# Patient Record
Sex: FEMALE | Race: WHITE | Employment: PART TIME | URBAN - METROPOLITAN AREA
[De-identification: names, ages, dates, MRNs, and addresses within clinical notes are randomized per-mention and may not be internally consistent; named-entity substitution may affect disease eponyms.]

---

## 2017-02-21 ENCOUNTER — TELEPHONE (OUTPATIENT)
Dept: OBGYN CLINIC | Facility: CLINIC | Age: 27
End: 2017-02-21

## 2017-02-21 DIAGNOSIS — Z32.00 PREGNANCY EXAMINATION OR TEST, PREGNANCY UNCONFIRMED: Primary | ICD-10-CM

## 2017-02-21 NOTE — TELEPHONE ENCOUNTER
Schedule colpo ASAP (in RN book slot) and annual in regular gyne slot, 1st available. Please call pt. Will need pregnancy test the day before colpo. Order placed.

## 2017-02-28 ENCOUNTER — OFFICE VISIT (OUTPATIENT)
Dept: OTOLARYNGOLOGY | Facility: CLINIC | Age: 27
End: 2017-02-28

## 2017-02-28 ENCOUNTER — TELEPHONE (OUTPATIENT)
Dept: OBGYN CLINIC | Facility: CLINIC | Age: 27
End: 2017-02-28

## 2017-02-28 ENCOUNTER — OFFICE VISIT (OUTPATIENT)
Dept: OBGYN CLINIC | Facility: CLINIC | Age: 27
End: 2017-02-28

## 2017-02-28 ENCOUNTER — APPOINTMENT (OUTPATIENT)
Dept: LAB | Facility: HOSPITAL | Age: 27
End: 2017-02-28
Attending: OBSTETRICS & GYNECOLOGY
Payer: MEDICAID

## 2017-02-28 VITALS
TEMPERATURE: 99 F | WEIGHT: 115 LBS | HEIGHT: 63 IN | DIASTOLIC BLOOD PRESSURE: 60 MMHG | BODY MASS INDEX: 20.37 KG/M2 | SYSTOLIC BLOOD PRESSURE: 90 MMHG

## 2017-02-28 VITALS
BODY MASS INDEX: 20.37 KG/M2 | DIASTOLIC BLOOD PRESSURE: 66 MMHG | HEART RATE: 85 BPM | WEIGHT: 115 LBS | HEIGHT: 63 IN | SYSTOLIC BLOOD PRESSURE: 106 MMHG

## 2017-02-28 DIAGNOSIS — Z32.00 PREGNANCY EXAMINATION OR TEST, PREGNANCY UNCONFIRMED: ICD-10-CM

## 2017-02-28 DIAGNOSIS — J34.89 NASAL OBSTRUCTION: Primary | ICD-10-CM

## 2017-02-28 DIAGNOSIS — N76.0 VAGINITIS AND VULVOVAGINITIS: Primary | ICD-10-CM

## 2017-02-28 LAB — HCG SERPL QL: NEGATIVE

## 2017-02-28 PROCEDURE — 84703 CHORIONIC GONADOTROPIN ASSAY: CPT

## 2017-02-28 PROCEDURE — 99243 OFF/OP CNSLTJ NEW/EST LOW 30: CPT | Performed by: OTOLARYNGOLOGY

## 2017-02-28 PROCEDURE — 99212 OFFICE O/P EST SF 10 MIN: CPT | Performed by: OTOLARYNGOLOGY

## 2017-02-28 PROCEDURE — 36415 COLL VENOUS BLD VENIPUNCTURE: CPT

## 2017-02-28 RX ORDER — BUPROPION HYDROCHLORIDE 150 MG/1
150 TABLET ORAL DAILY
COMMUNITY
Start: 2017-02-19 | End: 2017-05-15

## 2017-02-28 NOTE — PROGRESS NOTES
LUNA pt. Has colpo scheduled tomorrow. Had itching yesterday and pt self treated herself with Monistat 1 and Diflucan yesterday. Symptoms better. Was concerned that she would not be able to have colpo with yeast infection.     No exam done since pt was

## 2017-02-28 NOTE — PROGRESS NOTES
Pily Marino is a 23year old female. Patient presents with:  Nose Problem: trouble breathing at her left nostril for 5 months      HISTORY OF PRESENT ILLNESS    She presents with a five-month history of chronic left nasal obstruction.  This all occurred Negative Dyspnea and wheezing. Cardio Negative Chest pain, irregular heartbeat/palpitations and syncope. GI Negative Abdominal pain and diarrhea. Endocrine Negative Cold intolerance and heat intolerance. Neuro Negative Tremors.    Psych Negative Anx 250 MG Oral Tab, Take two tablets by mouth today, then one daily. , Disp: 6 tablet, Rfl: 0  •  predniSONE 10 MG Oral Tab, Take 30 mg po [id number], 20 mg po dailyx 2days,10 mg po daily x2 days,5 mg po dailyx1 day, Disp: 13 tablet, Rfl: 0  •  Nitrofurantoin

## 2017-02-28 NOTE — TELEPHONE ENCOUNTER
Pt thinks that she has a yeast infection. Pt has vaginal itching and creamy thick discharge and vaginal irritation. Denies vaginal odor. Pt made an appt to see Nestor Zavala today. (Pt is a [facility] pt.)  pT Took Monistat one day on 2/27/17 for only one day.   Anell Mealy

## 2017-03-01 ENCOUNTER — OFFICE VISIT (OUTPATIENT)
Dept: OBGYN CLINIC | Facility: CLINIC | Age: 27
End: 2017-03-01

## 2017-03-01 VITALS — HEART RATE: 73 BPM | DIASTOLIC BLOOD PRESSURE: 68 MMHG | SYSTOLIC BLOOD PRESSURE: 108 MMHG

## 2017-03-01 DIAGNOSIS — R87.810 CERVICAL HIGH RISK HUMAN PAPILLOMAVIRUS (HPV) DNA TEST POSITIVE: Primary | ICD-10-CM

## 2017-03-01 PROCEDURE — 88305 TISSUE EXAM BY PATHOLOGIST: CPT | Performed by: OBSTETRICS & GYNECOLOGY

## 2017-03-01 PROCEDURE — 57454 BX/CURETT OF CERVIX W/SCOPE: CPT | Performed by: OBSTETRICS & GYNECOLOGY

## 2017-03-01 NOTE — PROCEDURES
Colpo w/Cx Biopsy and ECC    Pregnancy Results: negative from blood test   Birth control method(s) used: condoms    Consent signed. Procedure discussed with patient in detail including indication, risk, benefits, alternatives and complications.     Indic

## 2017-03-03 ENCOUNTER — TELEPHONE (OUTPATIENT)
Dept: OTOLARYNGOLOGY | Facility: CLINIC | Age: 27
End: 2017-03-03

## 2017-03-03 NOTE — TELEPHONE ENCOUNTER
pt called, lov 2/28/17 w/ERIN. Patient is following up on a Prior Auth for a CT Sinus. Please advise.

## 2017-03-07 ENCOUNTER — TELEPHONE (OUTPATIENT)
Dept: OBGYN CLINIC | Facility: CLINIC | Age: 27
End: 2017-03-07

## 2017-03-07 DIAGNOSIS — F41.9 ANXIETY: ICD-10-CM

## 2017-03-07 DIAGNOSIS — N87.1 CIN II (CERVICAL INTRAEPITHELIAL NEOPLASIA II): Primary | ICD-10-CM

## 2017-03-07 NOTE — TELEPHONE ENCOUNTER
Advised pt that Prior authorization pending per ADALGISA - tracking #[id number], clinicals faxed to [facility] [phone]. Advised pt once approved, pt will be notified.

## 2017-03-07 NOTE — TELEPHONE ENCOUNTER
Patient calling to check status on prior auth. Wants to know if it has been approved or not. States she wants to get CT done asap because she can not breath through her nose and is very uncomfortable. Please advise. Thank you.

## 2017-03-08 NOTE — TELEPHONE ENCOUNTER
Pt given results and recommendations. Pt would like to discuss LEEP, states her last LEEP was done under anesthesia. Pt would like to schedule LEEP under anesthesia as she had a lot of discomfort during colposcopy.  Pt advised message will be sent to [address] [facility] fo

## 2017-03-08 NOTE — TELEPHONE ENCOUNTER
OB GYN SURGICAL SCHEDULING    Diagnosis: BESSY 2, anxiety    Operative Procedure:  LEEP    Side: neither    Date: when not on menses    Admission:  Day Surgery    Anesthesia:  iv sed/mac     Bowel Prep:  No    Cytotec (200 mcg night prior):  No    Rep needed

## 2017-03-08 NOTE — TELEPHONE ENCOUNTER
Spoke to the patient advised patient and we confirmed surgery is scheduled 4/17/17 at 7:30am [facility]. Patient instructions mailed. Pat orders routed.

## 2017-03-08 NOTE — TELEPHONE ENCOUNTER
----- Message from Roz Pineda MD sent at 3/6/2017 11:31 AM CST -----  Results with BESSY 2 & (+) ECC BESSY 1 -- given already has had one LEEP in past, can send for second opinion LEEP -- Pt is [employer].  If wishes for LEEP by me, then needs to wait one mo

## 2017-03-09 NOTE — TELEPHONE ENCOUNTER
LMTCB- please inform pt that prior authorization approved for Sinus CT at [address] [place] [address]. Pt can call central scheduling to schedule.

## 2017-03-11 ENCOUNTER — HOSPITAL ENCOUNTER (OUTPATIENT)
Dept: CT IMAGING | Age: 27
Discharge: HOME | End: 2017-03-11
Attending: OTOLARYNGOLOGY
Payer: MEDICAID

## 2017-03-11 DIAGNOSIS — J34.89 NASAL OBSTRUCTION: ICD-10-CM

## 2017-03-11 PROCEDURE — 70486 CT MAXILLOFACIAL W/O DYE: CPT

## 2017-03-14 ENCOUNTER — OFFICE VISIT (OUTPATIENT)
Dept: OTOLARYNGOLOGY | Facility: CLINIC | Age: 27
End: 2017-03-14

## 2017-03-14 VITALS
WEIGHT: 115 LBS | BODY MASS INDEX: 20.37 KG/M2 | TEMPERATURE: 98 F | SYSTOLIC BLOOD PRESSURE: 100 MMHG | HEIGHT: 63 IN | DIASTOLIC BLOOD PRESSURE: 60 MMHG

## 2017-03-14 DIAGNOSIS — J34.3 HYPERTROPHY OF NASAL TURBINATES: ICD-10-CM

## 2017-03-14 DIAGNOSIS — J34.2 DEVIATED NASAL SEPTUM: Primary | ICD-10-CM

## 2017-03-14 DIAGNOSIS — J32.0 CHRONIC MAXILLARY SINUSITIS: ICD-10-CM

## 2017-03-14 PROCEDURE — 31231 NASAL ENDOSCOPY DX: CPT | Performed by: OTOLARYNGOLOGY

## 2017-03-14 PROCEDURE — 99212 OFFICE O/P EST SF 10 MIN: CPT | Performed by: OTOLARYNGOLOGY

## 2017-03-14 PROCEDURE — 99214 OFFICE O/P EST MOD 30 MIN: CPT | Performed by: OTOLARYNGOLOGY

## 2017-03-14 NOTE — PROGRESS NOTES
Juan Boston is a 23year old female. Patient presents with: Follow - Up: CT scan result done on 3/11/17      HISTORY OF PRESENT ILLNESS      She presents with a five-month history of chronic left nasal obstruction. This all occurred after a cold.  Since Crohn's   • Anxiety    • Crohn's disease ([facility] [address] 2012   • Human papilloma virus infection            Past Surgical History    COLONOSCOPY  2/2013         REVIEW OF SYSTEMS    System Neg/Pos Details   Constitutional Negative Fatigue, fever and weight loss. Lips/teeth/gums - Normal. Tonsils - Normal. Oropharynx - Normal.   Nose/Mouth/Throat Normal Nares - Right: Normal Left: Normal. Septum -Deviated to the right 75% Turbinates - Right: Mild hypertrophy, Left:Moderate hypertrophy   Nasal endoscopy    Nasal muc

## 2017-03-15 ENCOUNTER — TELEPHONE (OUTPATIENT)
Dept: OTOLARYNGOLOGY | Facility: CLINIC | Age: 27
End: 2017-03-15

## 2017-03-15 NOTE — TELEPHONE ENCOUNTER
LM regarding ins verification is handled by [facility] care at [phone] or [address] [place] [address].

## 2017-03-15 NOTE — TELEPHONE ENCOUNTER
Pt is having procedure on 3/20- needs PA - [facility] call [facility] [phone]

## 2017-03-16 ENCOUNTER — TELEPHONE (OUTPATIENT)
Dept: OTOLARYNGOLOGY | Facility: CLINIC | Age: 27
End: 2017-03-16

## 2017-03-16 RX ORDER — METHYLPREDNISOLONE 4 MG/1
TABLET ORAL
Qty: 1 PACKAGE | Refills: 0 | Status: SHIPPED | OUTPATIENT
Start: 2017-03-16 | End: 2017-03-28 | Stop reason: ALTCHOICE

## 2017-03-16 NOTE — TELEPHONE ENCOUNTER
Per  send in rx for medrol dose pack, pt to start medrol dose pack 5 days prior to surgery, pt is not to take on monday

## 2017-03-17 ENCOUNTER — TELEPHONE (OUTPATIENT)
Dept: OTOLARYNGOLOGY | Facility: CLINIC | Age: 27
End: 2017-03-17

## 2017-03-17 NOTE — TELEPHONE ENCOUNTER
Pt scheduled for septoplasty, turb red on 3/17. Pt requesting excuse note for work to excuse her for one week. Dr. Roger Contreras, please advise if okay to generate note; also note if pt will need to be off longer and any restrictions.

## 2017-03-17 NOTE — TELEPHONE ENCOUNTER
pt called. She is scheduled for surgery Monday 3/20/17. She will need a note for her work to excuse her for one week. Please call when ready.

## 2017-03-20 ENCOUNTER — HOSPITAL ENCOUNTER (OUTPATIENT)
Facility: HOSPITAL | Age: 27
Setting detail: HOSPITAL OUTPATIENT SURGERY
Discharge: HOME | End: 2017-03-20
Attending: OTOLARYNGOLOGY | Admitting: OTOLARYNGOLOGY
Payer: MEDICAID

## 2017-03-20 ENCOUNTER — ANESTHESIA (OUTPATIENT)
Dept: SURGERY | Facility: HOSPITAL | Age: 27
End: 2017-03-20
Payer: MEDICAID

## 2017-03-20 ENCOUNTER — SURGERY (OUTPATIENT)
Age: 27
End: 2017-03-20

## 2017-03-20 ENCOUNTER — ANESTHESIA EVENT (OUTPATIENT)
Dept: SURGERY | Facility: HOSPITAL | Age: 27
End: 2017-03-20
Payer: MEDICAID

## 2017-03-20 VITALS
BODY MASS INDEX: 19.31 KG/M2 | SYSTOLIC BLOOD PRESSURE: 114 MMHG | OXYGEN SATURATION: 100 % | TEMPERATURE: 98 F | RESPIRATION RATE: 20 BRPM | WEIGHT: 109 LBS | DIASTOLIC BLOOD PRESSURE: 65 MMHG | HEART RATE: 61 BPM | HEIGHT: 63 IN

## 2017-03-20 DIAGNOSIS — J32.0 CHRONIC MAXILLARY SINUSITIS: ICD-10-CM

## 2017-03-20 DIAGNOSIS — J34.3 HYPERTROPHY OF NASAL TURBINATES: ICD-10-CM

## 2017-03-20 DIAGNOSIS — J34.2 DEVIATED NASAL SEPTUM: ICD-10-CM

## 2017-03-20 DIAGNOSIS — J33.9 NASAL POLYPOSIS: Primary | ICD-10-CM

## 2017-03-20 LAB — B-HCG UR QL: NEGATIVE

## 2017-03-20 PROCEDURE — 30140 RESECT INFERIOR TURBINATE: CPT | Performed by: OTOLARYNGOLOGY

## 2017-03-20 PROCEDURE — 099R4ZZ DRAINAGE OF LEFT MAXILLARY SINUS, PERCUTANEOUS ENDOSCOPIC APPROACH: ICD-10-PCS | Performed by: OTOLARYNGOLOGY

## 2017-03-20 PROCEDURE — 31237 NSL/SINS NDSC SURG BX POLYPC: CPT | Performed by: OTOLARYNGOLOGY

## 2017-03-20 PROCEDURE — 8E09XBG COMPUTER ASSISTED PROCEDURE OF HEAD AND NECK REGION, WITH COMPUTERIZED TOMOGRAPHY: ICD-10-PCS | Performed by: OTOLARYNGOLOGY

## 2017-03-20 PROCEDURE — 09BL7ZZ EXCISION OF NASAL TURBINATE, VIA NATURAL OR ARTIFICIAL OPENING: ICD-10-PCS | Performed by: OTOLARYNGOLOGY

## 2017-03-20 PROCEDURE — 09BM0ZZ EXCISION OF NASAL SEPTUM, OPEN APPROACH: ICD-10-PCS | Performed by: OTOLARYNGOLOGY

## 2017-03-20 PROCEDURE — 09BR4ZX EXCISION OF LEFT MAXILLARY SINUS, PERCUTANEOUS ENDOSCOPIC APPROACH, DIAGNOSTIC: ICD-10-PCS | Performed by: OTOLARYNGOLOGY

## 2017-03-20 PROCEDURE — 30520 REPAIR OF NASAL SEPTUM: CPT | Performed by: OTOLARYNGOLOGY

## 2017-03-20 PROCEDURE — 09BK4ZX EXCISION OF NASAL MUCOSA AND SOFT TISSUE, PERCUTANEOUS ENDOSCOPIC APPROACH, DIAGNOSTIC: ICD-10-PCS | Performed by: OTOLARYNGOLOGY

## 2017-03-20 PROCEDURE — 31267 ENDOSCOPY MAXILLARY SINUS: CPT | Performed by: OTOLARYNGOLOGY

## 2017-03-20 RX ORDER — HYDROCODONE BITARTRATE AND ACETAMINOPHEN 7.5; 325 MG/1; MG/1
1 TABLET ORAL EVERY 6 HOURS PRN
Qty: 30 TABLET | Refills: 0 | Status: SHIPPED | OUTPATIENT
Start: 2017-03-20 | End: 2017-03-28 | Stop reason: ALTCHOICE

## 2017-03-20 RX ORDER — HYDROMORPHONE HYDROCHLORIDE 1 MG/ML
0.4 INJECTION, SOLUTION INTRAMUSCULAR; INTRAVENOUS; SUBCUTANEOUS EVERY 5 MIN PRN
Status: DISCONTINUED | OUTPATIENT
Start: 2017-03-20 | End: 2017-03-20

## 2017-03-20 RX ORDER — LIDOCAINE HYDROCHLORIDE 40 MG/ML
SOLUTION TOPICAL AS NEEDED
Status: DISCONTINUED | OUTPATIENT
Start: 2017-03-20 | End: 2017-03-20 | Stop reason: SURG

## 2017-03-20 RX ORDER — ACETAMINOPHEN 160 MG/5ML
650 SOLUTION ORAL EVERY 4 HOURS PRN
Status: DISCONTINUED | OUTPATIENT
Start: 2017-03-20 | End: 2017-03-20

## 2017-03-20 RX ORDER — ONDANSETRON 4 MG/1
4 TABLET, ORALLY DISINTEGRATING ORAL EVERY 6 HOURS PRN
Status: DISCONTINUED | OUTPATIENT
Start: 2017-03-20 | End: 2017-03-20

## 2017-03-20 RX ORDER — HALOPERIDOL 5 MG/ML
0.25 INJECTION INTRAMUSCULAR ONCE AS NEEDED
Status: DISCONTINUED | OUTPATIENT
Start: 2017-03-20 | End: 2017-03-20

## 2017-03-20 RX ORDER — HYDROCODONE BITARTRATE AND ACETAMINOPHEN 5; 325 MG/1; MG/1
1 TABLET ORAL AS NEEDED
Status: COMPLETED | OUTPATIENT
Start: 2017-03-20 | End: 2017-03-20

## 2017-03-20 RX ORDER — ONDANSETRON 2 MG/ML
4 INJECTION INTRAMUSCULAR; INTRAVENOUS ONCE AS NEEDED
Status: DISCONTINUED | OUTPATIENT
Start: 2017-03-20 | End: 2017-03-20

## 2017-03-20 RX ORDER — CEPHALEXIN 500 MG/1
500 CAPSULE ORAL EVERY 8 HOURS
Qty: 21 CAPSULE | Refills: 0 | Status: SHIPPED | OUTPATIENT
Start: 2017-03-20 | End: 2017-03-28 | Stop reason: ALTCHOICE

## 2017-03-20 RX ORDER — LIDOCAINE HYDROCHLORIDE AND EPINEPHRINE 10; 10 MG/ML; UG/ML
INJECTION, SOLUTION INFILTRATION; PERINEURAL AS NEEDED
Status: DISCONTINUED | OUTPATIENT
Start: 2017-03-20 | End: 2017-03-20 | Stop reason: HOSPADM

## 2017-03-20 RX ORDER — ACETAMINOPHEN 325 MG/1
650 TABLET ORAL ONCE
Status: COMPLETED | OUTPATIENT
Start: 2017-03-20 | End: 2017-03-20

## 2017-03-20 RX ORDER — SODIUM CHLORIDE, SODIUM LACTATE, POTASSIUM CHLORIDE, CALCIUM CHLORIDE 600; 310; 30; 20 MG/100ML; MG/100ML; MG/100ML; MG/100ML
INJECTION, SOLUTION INTRAVENOUS CONTINUOUS
Status: DISCONTINUED | OUTPATIENT
Start: 2017-03-20 | End: 2017-03-20

## 2017-03-20 RX ORDER — HYDROCODONE BITARTRATE AND ACETAMINOPHEN 5; 325 MG/1; MG/1
2 TABLET ORAL AS NEEDED
Status: COMPLETED | OUTPATIENT
Start: 2017-03-20 | End: 2017-03-20

## 2017-03-20 RX ORDER — HYDROMORPHONE HYDROCHLORIDE 1 MG/ML
0.6 INJECTION, SOLUTION INTRAMUSCULAR; INTRAVENOUS; SUBCUTANEOUS EVERY 5 MIN PRN
Status: DISCONTINUED | OUTPATIENT
Start: 2017-03-20 | End: 2017-03-20

## 2017-03-20 RX ORDER — HYDROMORPHONE HYDROCHLORIDE 1 MG/ML
0.2 INJECTION, SOLUTION INTRAMUSCULAR; INTRAVENOUS; SUBCUTANEOUS EVERY 5 MIN PRN
Status: DISCONTINUED | OUTPATIENT
Start: 2017-03-20 | End: 2017-03-20

## 2017-03-20 RX ORDER — METOCLOPRAMIDE 10 MG/1
10 TABLET ORAL ONCE
Status: DISCONTINUED | OUTPATIENT
Start: 2017-03-20 | End: 2017-03-20 | Stop reason: HOSPADM

## 2017-03-20 RX ORDER — FAMOTIDINE 20 MG/1
20 TABLET ORAL ONCE
Status: DISCONTINUED | OUTPATIENT
Start: 2017-03-20 | End: 2017-03-20 | Stop reason: HOSPADM

## 2017-03-20 RX ORDER — HYDROCODONE BITARTRATE AND ACETAMINOPHEN 5; 325 MG/1; MG/1
1 TABLET ORAL EVERY 4 HOURS PRN
Status: DISCONTINUED | OUTPATIENT
Start: 2017-03-20 | End: 2017-03-20

## 2017-03-20 RX ORDER — MORPHINE SULFATE 2 MG/ML
2 INJECTION, SOLUTION INTRAMUSCULAR; INTRAVENOUS EVERY 10 MIN PRN
Status: DISCONTINUED | OUTPATIENT
Start: 2017-03-20 | End: 2017-03-20

## 2017-03-20 RX ORDER — ACETAMINOPHEN 325 MG/1
650 TABLET ORAL EVERY 4 HOURS PRN
Status: DISCONTINUED | OUTPATIENT
Start: 2017-03-20 | End: 2017-03-20

## 2017-03-20 RX ORDER — ONDANSETRON 2 MG/ML
4 INJECTION INTRAMUSCULAR; INTRAVENOUS EVERY 6 HOURS PRN
Status: DISCONTINUED | OUTPATIENT
Start: 2017-03-20 | End: 2017-03-20

## 2017-03-20 RX ORDER — DEXAMETHASONE SODIUM PHOSPHATE 4 MG/ML
VIAL (ML) INJECTION AS NEEDED
Status: DISCONTINUED | OUTPATIENT
Start: 2017-03-20 | End: 2017-03-20 | Stop reason: SURG

## 2017-03-20 RX ORDER — DIAPER,BRIEF,INFANT-TODD,DISP
EACH MISCELLANEOUS AS NEEDED
Status: DISCONTINUED | OUTPATIENT
Start: 2017-03-20 | End: 2017-03-20 | Stop reason: HOSPADM

## 2017-03-20 RX ORDER — SUCCINYLCHOLINE CHLORIDE 20 MG/ML
INJECTION INTRAMUSCULAR; INTRAVENOUS AS NEEDED
Status: DISCONTINUED | OUTPATIENT
Start: 2017-03-20 | End: 2017-03-20 | Stop reason: SURG

## 2017-03-20 RX ORDER — CEFAZOLIN SODIUM 1 G/3ML
INJECTION, POWDER, FOR SOLUTION INTRAMUSCULAR; INTRAVENOUS AS NEEDED
Status: DISCONTINUED | OUTPATIENT
Start: 2017-03-20 | End: 2017-03-20 | Stop reason: SURG

## 2017-03-20 RX ORDER — MORPHINE SULFATE 4 MG/ML
4 INJECTION, SOLUTION INTRAMUSCULAR; INTRAVENOUS EVERY 10 MIN PRN
Status: DISCONTINUED | OUTPATIENT
Start: 2017-03-20 | End: 2017-03-20

## 2017-03-20 RX ORDER — NALOXONE HYDROCHLORIDE 0.4 MG/ML
80 INJECTION, SOLUTION INTRAMUSCULAR; INTRAVENOUS; SUBCUTANEOUS AS NEEDED
Status: DISCONTINUED | OUTPATIENT
Start: 2017-03-20 | End: 2017-03-20

## 2017-03-20 RX ORDER — MORPHINE SULFATE 10 MG/ML
6 INJECTION, SOLUTION INTRAMUSCULAR; INTRAVENOUS EVERY 10 MIN PRN
Status: DISCONTINUED | OUTPATIENT
Start: 2017-03-20 | End: 2017-03-20

## 2017-03-20 RX ORDER — SODIUM CHLORIDE 0.9 % (FLUSH) 0.9 %
10 SYRINGE (ML) INJECTION AS NEEDED
Status: DISCONTINUED | OUTPATIENT
Start: 2017-03-20 | End: 2017-03-20

## 2017-03-20 RX ADMIN — CEFAZOLIN SODIUM 1 G: 1 INJECTION, POWDER, FOR SOLUTION INTRAMUSCULAR; INTRAVENOUS at 07:36:00

## 2017-03-20 RX ADMIN — DEXAMETHASONE SODIUM PHOSPHATE 4 MG: 4 MG/ML VIAL (ML) INJECTION at 07:44:00

## 2017-03-20 RX ADMIN — SODIUM CHLORIDE, SODIUM LACTATE, POTASSIUM CHLORIDE, CALCIUM CHLORIDE: 600; 310; 30; 20 INJECTION, SOLUTION INTRAVENOUS at 07:26:00

## 2017-03-20 RX ADMIN — SUCCINYLCHOLINE CHLORIDE 60 MG: 20 INJECTION INTRAMUSCULAR; INTRAVENOUS at 07:32:00

## 2017-03-20 RX ADMIN — LIDOCAINE HYDROCHLORIDE 2 ML: 40 SOLUTION TOPICAL at 07:33:00

## 2017-03-20 NOTE — BRIEF OP NOTE
[facility] OPERATING ROOM  Brief Op Note     Manpreet Glance Location: OR   CSN [id number] MRN [id number]   Admission Date 3/20/2017 Operation Date 3/20/2017   Attending Physician Rosina Castro MD Operating Physician Jaci Christopher.  Hilario Jade MD       Pre-Oper

## 2017-03-20 NOTE — INTERVAL H&P NOTE
Pre-op Diagnosis: Deviated nasal septum [J34.2]  Chronic maxillary sinusitis [J32.0]  Hypertrophy of nasal turbinates [J34.3]    The above referenced H&P was reviewed by Quinn Newman.  Ronnie Kovacs MD on 3/20/2017, the patient was examined and no significant changes h

## 2017-03-20 NOTE — ANESTHESIA POSTPROCEDURE EVALUATION
Patient: Den Betancourt    Procedure Summary     Date Anesthesia Start Anesthesia Stop Room / Location    03/20/17 0726 0819 [facility] MAIN OR 03 / [facility] MAIN OR       Procedure Diagnosis Surgeon Responsible Provider    NASAL SEPTOPLASTY BILAT SINUS ENDOSCOPY ETHM

## 2017-03-20 NOTE — ANESTHESIA PREPROCEDURE EVALUATION
Anesthesia PreOp Note    HPI:     Rhina Huynh is a 23year old female who presents for preoperative consultation requested by: Indio Arthur MD    Date of Surgery: 3/20/2017    Procedure(s):  NASAL SEPTOPLASTY BILAT SINUS ENDOSCOPY ETHM MAX  Indication Oral Tab  Disp:  Rfl: 0 3/20/2017 at 0500       Current Facility-Administered Medications Ordered in Epic:  lactated ringers infusion  Intravenous Continuous Mason Casas MD Last Rate: 20 mL/hr at 03/20/17 4388   [facility] Hold] famoTIDine (PEPCID) tab 20 mg Drug Use: No    Sexual Activity: Yes    Partners: Male    Birth Control/ Protection: Condom    Comment: same partner x 2 years     Other Topics Concern    Blood Transfusions No    Caffeine Concern No    Sleep Concern No    Stress Concern Yes    Weight Conc

## 2017-03-20 NOTE — H&P
HISTORY OF PRESENT ILLNESS      She presents with a five-month history of chronic left nasal obstruction. This all occurred after a cold.  Since that time she's not been able to breathe very well from the left side and this is become very all of a sudden to disease (HCC)  2012    •  Human papilloma virus infection                Past Surgical History      COLONOSCOPY    2/2013          REVIEW OF SYSTEMS      System  Neg/Pos  Details    Constitutional  Negative  Fatigue, fever and weight loss.    ENMT  Negativ     Nose/Mouth/Throat  Normal  External nose - Normal. Lips/teeth/gums - Normal. Tonsils - Normal. Oropharynx - Normal.    Nose/Mouth/Throat  Normal  Nares - Right: Normal Left: Normal. Septum -Deviated to the right 75% Turbinates - Right: Mild hypertrophy

## 2017-03-20 NOTE — OR NURSING
Bilateral nasal packing removed.   Soaked with serosanguinous, no clots, no nasal discharge at time of removal.  Tolerated well.  requests pain medication, will administer

## 2017-03-21 ENCOUNTER — TELEPHONE (OUTPATIENT)
Dept: OTOLARYNGOLOGY | Facility: CLINIC | Age: 27
End: 2017-03-21

## 2017-03-21 NOTE — TELEPHONE ENCOUNTER
Pt is post op day 1, septoplasty, turb red.   Per pt, drainage has slowed, pain is minimal.  Advised pt no nose blowing, sneeze with mouth open, no heavy lifting or bending for up to two weeks post op; may use OTC saline spray for congestion, may use Afrin

## 2017-03-21 NOTE — OPERATIVE REPORT
[facility]    PATIENT'S NAME: Elian Leonardmarline   ATTENDING PHYSICIAN: Sacha Vang. Yaquelin Linton MD   OPERATING PHYSICIAN: Sacha Vang.  Yaquelin Linton MD   PATIENT ACCOUNT#:   [id number]    LOCATION:  [facility]  MEDICAL RECORD #:   [id number]       DATE OF BIR inserted into the nose demonstrating a large polyp completely obstructing the entire nasal cavity and posterior nasopharynx extending anteriorly and superiorly up into the maxillary antrum on the left. This appeared to be a very large antrochoanal polyp. turbinate, the bony portions were noted to impinge upon the nasal airway; therefore, an outfracturing technique was performed bilaterally.   One Merocel sponge was placed in each naris and the patient was subsequently awakened, extubated, and taken to the r

## 2017-03-28 ENCOUNTER — OFFICE VISIT (OUTPATIENT)
Dept: OTOLARYNGOLOGY | Facility: CLINIC | Age: 27
End: 2017-03-28

## 2017-03-28 VITALS
BODY MASS INDEX: 20.37 KG/M2 | TEMPERATURE: 97 F | DIASTOLIC BLOOD PRESSURE: 70 MMHG | WEIGHT: 115 LBS | SYSTOLIC BLOOD PRESSURE: 90 MMHG | HEIGHT: 63 IN

## 2017-03-28 DIAGNOSIS — J32.0 CHRONIC MAXILLARY SINUSITIS: ICD-10-CM

## 2017-03-28 DIAGNOSIS — J34.2 DEVIATED NASAL SEPTUM: Primary | ICD-10-CM

## 2017-03-28 PROCEDURE — 99212 OFFICE O/P EST SF 10 MIN: CPT | Performed by: OTOLARYNGOLOGY

## 2017-03-28 PROCEDURE — 99024 POSTOP FOLLOW-UP VISIT: CPT | Performed by: OTOLARYNGOLOGY

## 2017-03-28 RX ORDER — OXCARBAZEPINE 300 MG/1
TABLET, FILM COATED ORAL
Refills: 2 | COMMUNITY
Start: 2017-03-16 | End: 2017-05-15

## 2017-03-28 NOTE — PROGRESS NOTES
Batsheva Pelaez is a 23year old female. Patient presents with:  Post-Op: s/p sinus/septoplasty/turb reduction done on 3/20/17      HISTORY OF PRESENT ILLNESS    She presents with a five-month history of chronic left nasal obstruction.  This all occurre Comment:ocassional  Seat Belt               Yes        No family history on file.     Past Medical History   Diagnosis Date   • Diarrhea      probably IBS, was previously dx'ed with Crohn's   • Anxiety    • Human papilloma virus infection    • Deviated nasa External nose - Normal. Lips/teeth/gums - Normal. Tonsils - Normal. Oropharynx - Normal.   Ears Normal Inspection - Right: Normal, Left: Normal. Canal - Right: Normal, Left: Normal. TM - Right: Normal, Left: Normal.   Skin Normal Inspection - Normal.

## 2017-03-30 ENCOUNTER — TELEPHONE (OUTPATIENT)
Dept: OTOLARYNGOLOGY | Facility: CLINIC | Age: 27
End: 2017-03-30

## 2017-03-30 NOTE — TELEPHONE ENCOUNTER
Pt made aware that a note was given to her on 3-17-17 that she could return to school on 4-3-17. Pt has the note and will give that to the school. If they don't accept she will call us back.

## 2017-03-30 NOTE — TELEPHONE ENCOUNTER
Pt requesting a note for school to return on: 4/03. Pt will obtain from [facility]. Thank you.

## 2017-04-03 ENCOUNTER — TELEPHONE (OUTPATIENT)
Dept: OTOLARYNGOLOGY | Facility: CLINIC | Age: 27
End: 2017-04-03

## 2017-04-03 NOTE — TELEPHONE ENCOUNTER
Pts school is asking for new note from post op appt on 3/28- stating she is clear to return to school today - pls fax to [phone] zeeshan Adorno

## 2017-04-03 NOTE — TELEPHONE ENCOUNTER
Note generated and faxed to Jason Flowers, [phone]; confirmation rec'd. Pt informed via ABODO.

## 2017-04-04 ENCOUNTER — HOSPITAL ENCOUNTER (OUTPATIENT)
Age: 27
Discharge: HOME | End: 2017-04-04
Attending: EMERGENCY MEDICINE
Payer: MEDICAID

## 2017-04-04 VITALS
WEIGHT: 110 LBS | HEIGHT: 63 IN | HEART RATE: 91 BPM | OXYGEN SATURATION: 100 % | RESPIRATION RATE: 16 BRPM | BODY MASS INDEX: 19.49 KG/M2 | SYSTOLIC BLOOD PRESSURE: 107 MMHG | TEMPERATURE: 98 F | DIASTOLIC BLOOD PRESSURE: 67 MMHG

## 2017-04-04 DIAGNOSIS — J02.9 ACUTE VIRAL PHARYNGITIS: Primary | ICD-10-CM

## 2017-04-04 PROCEDURE — 99212 OFFICE O/P EST SF 10 MIN: CPT

## 2017-04-04 PROCEDURE — 87430 STREP A AG IA: CPT

## 2017-04-04 PROCEDURE — 99213 OFFICE O/P EST LOW 20 MIN: CPT

## 2017-04-04 NOTE — ED INITIAL ASSESSMENT (HPI)
Sore throat started yesterday, worse today. Sinus surgery 2 weeks ago. No fever. No congestion or cough.

## 2017-04-04 NOTE — ED PROVIDER NOTES
Patient Seen in: [address] [facility]    History   Patient presents with:  Sore Throat    Stated Complaint: sore throat    HPI  Patient complains of a mild sore throat mostly on her left side yesterday.   Patient is concerned about reviewed and negative except as noted above. PSFH elements reviewed from today and agreed except as otherwise stated in HPI.     Physical Exam       ED Triage Vitals   BP 04/04/17 1719 107/67 mmHg   Pulse 04/04/17 1719 91   Resp 04/04/17 1719 16   Temp 0 Impression:  Acute viral pharyngitis  (primary encounter diagnosis)    Disposition:  Discharge    Follow-up:  Diaz Boone MD  [address] [place] [id number] [phone]    Schedule an appointment as soon as possible for a visit in 1 week  Follow

## 2017-04-10 ENCOUNTER — HOSPITAL ENCOUNTER (OUTPATIENT)
Age: 27
Discharge: HOME | End: 2017-04-10
Payer: MEDICAID

## 2017-04-10 VITALS
OXYGEN SATURATION: 100 % | HEIGHT: 63 IN | TEMPERATURE: 98 F | HEART RATE: 75 BPM | BODY MASS INDEX: 19.49 KG/M2 | SYSTOLIC BLOOD PRESSURE: 112 MMHG | DIASTOLIC BLOOD PRESSURE: 76 MMHG | WEIGHT: 110 LBS | RESPIRATION RATE: 12 BRPM

## 2017-04-10 DIAGNOSIS — J02.0 STREP PHARYNGITIS: Primary | ICD-10-CM

## 2017-04-10 PROCEDURE — 99213 OFFICE O/P EST LOW 20 MIN: CPT

## 2017-04-10 PROCEDURE — 87430 STREP A AG IA: CPT

## 2017-04-10 PROCEDURE — 99214 OFFICE O/P EST MOD 30 MIN: CPT

## 2017-04-10 RX ORDER — AMOXICILLIN 875 MG/1
875 TABLET, COATED ORAL 2 TIMES DAILY
Qty: 20 TABLET | Refills: 0 | Status: SHIPPED | OUTPATIENT
Start: 2017-04-10 | End: 2017-04-20

## 2017-04-10 NOTE — ED INITIAL ASSESSMENT (HPI)
REPORTS SORE THROAT X 1 WEEK. NEG STREP LAST WEEK. CONTINUED COUGH AND CONGESTION WITH SINUS PRESSURE, + DRY COUGH. REPORTS NASAL DRAINAGE. RECENT SINUS SURGERY. CAROL RAMIREZ AT BEDSIDE.

## 2017-04-10 NOTE — ED PROVIDER NOTES
[facility]    History   Patient presents with:  Cough    Stated Complaint: Laryngitis;  Congestion    HPI    Patient is a 73-year-old female who presents for evaluation of sore throat, congestion and nonpr Negative. Allergic/Immunologic: Negative. Neurological: Negative. Hematological: Negative. Psychiatric/Behavioral: Negative. All other systems reviewed and are negative. Positive for stated complaint: Laryngitis;  Congestion  Other syste reviewed. ED Course     Labs Reviewed   [facility] POCT RAPID STREP - Abnormal; Notable for the following:     POCT Rapid Strep Positive (*)     All other components within normal limits       MDM   Vitals stable, patient appears nontoxic.   Physical exam

## 2017-04-13 ENCOUNTER — TELEPHONE (OUTPATIENT)
Dept: PEDIATRICS CLINIC | Facility: CLINIC | Age: 27
End: 2017-04-13

## 2017-04-13 NOTE — TELEPHONE ENCOUNTER
Dejuan Mehta with the surgery dept is requesting a new surgery request form with the pt's full first name on it. The form should have a first name of An Peguero on it. Please advise.

## 2017-04-17 ENCOUNTER — ANESTHESIA EVENT (OUTPATIENT)
Dept: SURGERY | Facility: HOSPITAL | Age: 27
End: 2017-04-17
Payer: MEDICAID

## 2017-04-17 ENCOUNTER — ANESTHESIA (OUTPATIENT)
Dept: SURGERY | Facility: HOSPITAL | Age: 27
End: 2017-04-17
Payer: MEDICAID

## 2017-04-17 ENCOUNTER — SURGERY (OUTPATIENT)
Age: 27
End: 2017-04-17

## 2017-04-17 ENCOUNTER — HOSPITAL ENCOUNTER (OUTPATIENT)
Facility: HOSPITAL | Age: 27
Setting detail: HOSPITAL OUTPATIENT SURGERY
Discharge: HOME | End: 2017-04-17
Attending: OBSTETRICS & GYNECOLOGY | Admitting: OBSTETRICS & GYNECOLOGY
Payer: MEDICAID

## 2017-04-17 VITALS
RESPIRATION RATE: 14 BRPM | TEMPERATURE: 98 F | DIASTOLIC BLOOD PRESSURE: 69 MMHG | HEART RATE: 48 BPM | OXYGEN SATURATION: 100 % | WEIGHT: 107 LBS | HEIGHT: 63 IN | SYSTOLIC BLOOD PRESSURE: 102 MMHG | BODY MASS INDEX: 18.96 KG/M2

## 2017-04-17 DIAGNOSIS — N87.1 CIN II (CERVICAL INTRAEPITHELIAL NEOPLASIA II): Primary | ICD-10-CM

## 2017-04-17 PROCEDURE — 57522 CONIZATION OF CERVIX: CPT | Performed by: OBSTETRICS & GYNECOLOGY

## 2017-04-17 PROCEDURE — 0UBC7ZX EXCISION OF CERVIX, VIA NATURAL OR ARTIFICIAL OPENING, DIAGNOSTIC: ICD-10-PCS | Performed by: OBSTETRICS & GYNECOLOGY

## 2017-04-17 RX ORDER — DEXTROSE, SODIUM CHLORIDE, SODIUM LACTATE, POTASSIUM CHLORIDE, AND CALCIUM CHLORIDE 5; .6; .31; .03; .02 G/100ML; G/100ML; G/100ML; G/100ML; G/100ML
INJECTION, SOLUTION INTRAVENOUS CONTINUOUS
Status: DISCONTINUED | OUTPATIENT
Start: 2017-04-17 | End: 2017-04-17

## 2017-04-17 RX ORDER — MIDAZOLAM HYDROCHLORIDE 1 MG/ML
INJECTION INTRAMUSCULAR; INTRAVENOUS AS NEEDED
Status: DISCONTINUED | OUTPATIENT
Start: 2017-04-17 | End: 2017-04-17 | Stop reason: SURG

## 2017-04-17 RX ORDER — HYDROMORPHONE HYDROCHLORIDE 1 MG/ML
0.4 INJECTION, SOLUTION INTRAMUSCULAR; INTRAVENOUS; SUBCUTANEOUS EVERY 5 MIN PRN
Status: DISCONTINUED | OUTPATIENT
Start: 2017-04-17 | End: 2017-04-17

## 2017-04-17 RX ORDER — SODIUM CHLORIDE, SODIUM LACTATE, POTASSIUM CHLORIDE, CALCIUM CHLORIDE 600; 310; 30; 20 MG/100ML; MG/100ML; MG/100ML; MG/100ML
INJECTION, SOLUTION INTRAVENOUS CONTINUOUS
Status: DISCONTINUED | OUTPATIENT
Start: 2017-04-17 | End: 2017-04-17

## 2017-04-17 RX ORDER — HYDROMORPHONE HYDROCHLORIDE 1 MG/ML
0.2 INJECTION, SOLUTION INTRAMUSCULAR; INTRAVENOUS; SUBCUTANEOUS EVERY 5 MIN PRN
Status: DISCONTINUED | OUTPATIENT
Start: 2017-04-17 | End: 2017-04-17

## 2017-04-17 RX ORDER — MORPHINE SULFATE 4 MG/ML
4 INJECTION, SOLUTION INTRAMUSCULAR; INTRAVENOUS EVERY 10 MIN PRN
Status: DISCONTINUED | OUTPATIENT
Start: 2017-04-17 | End: 2017-04-17

## 2017-04-17 RX ORDER — ONDANSETRON 4 MG/1
4 TABLET, FILM COATED ORAL EVERY 8 HOURS PRN
Status: DISCONTINUED | OUTPATIENT
Start: 2017-04-17 | End: 2017-04-17

## 2017-04-17 RX ORDER — HYDROCODONE BITARTRATE AND ACETAMINOPHEN 5; 325 MG/1; MG/1
1 TABLET ORAL AS NEEDED
Status: DISCONTINUED | OUTPATIENT
Start: 2017-04-17 | End: 2017-04-17

## 2017-04-17 RX ORDER — FAMOTIDINE 20 MG/1
20 TABLET ORAL ONCE
Status: DISCONTINUED | OUTPATIENT
Start: 2017-04-17 | End: 2017-04-17 | Stop reason: HOSPADM

## 2017-04-17 RX ORDER — LIDOCAINE HYDROCHLORIDE 10 MG/ML
INJECTION, SOLUTION EPIDURAL; INFILTRATION; INTRACAUDAL; PERINEURAL AS NEEDED
Status: DISCONTINUED | OUTPATIENT
Start: 2017-04-17 | End: 2017-04-17 | Stop reason: SURG

## 2017-04-17 RX ORDER — ACETAMINOPHEN 325 MG/1
650 TABLET ORAL ONCE
Status: DISCONTINUED | OUTPATIENT
Start: 2017-04-17 | End: 2017-04-17 | Stop reason: HOSPADM

## 2017-04-17 RX ORDER — MORPHINE SULFATE 10 MG/ML
6 INJECTION, SOLUTION INTRAMUSCULAR; INTRAVENOUS EVERY 10 MIN PRN
Status: DISCONTINUED | OUTPATIENT
Start: 2017-04-17 | End: 2017-04-17

## 2017-04-17 RX ORDER — ONDANSETRON 2 MG/ML
4 INJECTION INTRAMUSCULAR; INTRAVENOUS ONCE AS NEEDED
Status: DISCONTINUED | OUTPATIENT
Start: 2017-04-17 | End: 2017-04-17

## 2017-04-17 RX ORDER — NALOXONE HYDROCHLORIDE 0.4 MG/ML
80 INJECTION, SOLUTION INTRAMUSCULAR; INTRAVENOUS; SUBCUTANEOUS AS NEEDED
Status: DISCONTINUED | OUTPATIENT
Start: 2017-04-17 | End: 2017-04-17

## 2017-04-17 RX ORDER — HALOPERIDOL 5 MG/ML
0.25 INJECTION INTRAMUSCULAR ONCE AS NEEDED
Status: DISCONTINUED | OUTPATIENT
Start: 2017-04-17 | End: 2017-04-17

## 2017-04-17 RX ORDER — METRONIDAZOLE 500 MG/1
500 TABLET ORAL 2 TIMES DAILY
Qty: 14 TABLET | Refills: 0 | Status: SHIPPED | OUTPATIENT
Start: 2017-04-17 | End: 2017-04-24

## 2017-04-17 RX ORDER — HYDROMORPHONE HYDROCHLORIDE 1 MG/ML
0.6 INJECTION, SOLUTION INTRAMUSCULAR; INTRAVENOUS; SUBCUTANEOUS EVERY 5 MIN PRN
Status: DISCONTINUED | OUTPATIENT
Start: 2017-04-17 | End: 2017-04-17

## 2017-04-17 RX ORDER — MORPHINE SULFATE 2 MG/ML
2 INJECTION, SOLUTION INTRAMUSCULAR; INTRAVENOUS EVERY 10 MIN PRN
Status: DISCONTINUED | OUTPATIENT
Start: 2017-04-17 | End: 2017-04-17

## 2017-04-17 RX ORDER — ONDANSETRON 2 MG/ML
4 INJECTION INTRAMUSCULAR; INTRAVENOUS EVERY 8 HOURS PRN
Status: DISCONTINUED | OUTPATIENT
Start: 2017-04-17 | End: 2017-04-17

## 2017-04-17 RX ORDER — HYDROCODONE BITARTRATE AND ACETAMINOPHEN 5; 325 MG/1; MG/1
2 TABLET ORAL AS NEEDED
Status: DISCONTINUED | OUTPATIENT
Start: 2017-04-17 | End: 2017-04-17

## 2017-04-17 RX ADMIN — LIDOCAINE HYDROCHLORIDE 50 MG: 10 INJECTION, SOLUTION EPIDURAL; INFILTRATION; INTRACAUDAL; PERINEURAL at 11:38:00

## 2017-04-17 RX ADMIN — MIDAZOLAM HYDROCHLORIDE 2 MG: 1 INJECTION INTRAMUSCULAR; INTRAVENOUS at 11:38:00

## 2017-04-17 RX ADMIN — SODIUM CHLORIDE, SODIUM LACTATE, POTASSIUM CHLORIDE, CALCIUM CHLORIDE: 600; 310; 30; 20 INJECTION, SOLUTION INTRAVENOUS at 11:38:00

## 2017-04-17 RX ADMIN — SODIUM CHLORIDE, SODIUM LACTATE, POTASSIUM CHLORIDE, CALCIUM CHLORIDE: 600; 310; 30; 20 INJECTION, SOLUTION INTRAVENOUS at 12:09:00

## 2017-04-17 NOTE — ANESTHESIA PREPROCEDURE EVALUATION
Anesthesia PreOp Note    HPI:     Kalispell Severe is a 23year old female who presents for preoperative consultation requested by: Wendy Monique MD    Date of Surgery: 4/17/2017    Procedure(s):  LEEP PROCEDURE  Indication: james II, anxiety    * No Caty in Epic:  lactated ringers infusion  Intravenous Continuous Susan Veronica MD Last Rate: 20 mL/hr at 04/17/17 0911   acetaminophen (TYLENOL) tab 650 mg 650 mg Oral Once Susan Veronica  mg at 04/17/17 0830   famoTIDine (PEPCID) tab 20 mg 20 mg Oral Anesthesia ROS/Med Hx and Physical Exam     Patient summary reviewed and Nursing notes reviewed    Airway   Mallampati: I  TM distance: >3 FB  Neck ROM: full  Dental - normal exam     Pulmonary - negative ROS and normal exam    breath sounds clear to auscu

## 2017-04-17 NOTE — H&P
[address] [facility] Patient Status:  Hospital Outpatient Surgery    1990 MRN [id number]   Location [facility] Attending Ange Jones MD   Hosp Day # 0 PCP Adolph Tiwari MD Oral Tab Take 5 mg by mouth nightly as needed. Review of Systems:   Pertinent items are noted in HPI. Physical Exam:   Vital Signs:  Blood pressure 105/67, pulse 50, temperature 97.9 °F (36.6 °C), temperature source Oral, resp.  rate 16, height 5                                                         Specimens:   A) - Cervix, cervical biopsy                                                                             B) - Cervix, Endocervix Curretting

## 2017-04-17 NOTE — BRIEF OP NOTE
[facility] OPERATING ROOM  Brief Op Note     [facility] Location: OR   [facility] [id number] MRN [id number]   Admission Date 4/17/2017 Operation Date 4/17/2017   Attending Physician Nilesh Melgar MD Operating Physician Leonela Sanford MD       Pre

## 2017-04-17 NOTE — OPERATIVE REPORT
[facility]    PATIENT'S NAME: Marcin Perez   ATTENDING PHYSICIAN: Dayanna Alexander MD   OPERATING PHYSICIAN: Dayanna Alexander MD   PATIENT ACCOUNT#:   [id number]    LOCATION:  [place] [address]  MEDICAL RECORD #:   [id number]       DATE [address] [facility] [id number]  [facility]/

## 2017-04-17 NOTE — ANESTHESIA POSTPROCEDURE EVALUATION
Patient: Yamil Mclean    Procedure Summary     Date Anesthesia Start Anesthesia Stop Room / Location    04/17/17 1135 1209 300 [facility] MAIN OR 16 / 300 [facility] MAIN OR       Procedure Diagnosis Surgeon Responsible Provider    LEEP PROCEDURE (N/A Cervix) (james II, anx

## 2017-04-22 ENCOUNTER — TELEPHONE (OUTPATIENT)
Dept: OBGYN CLINIC | Facility: CLINIC | Age: 27
End: 2017-04-22

## 2017-04-22 NOTE — TELEPHONE ENCOUNTER
Pt reports she had Leep on Mon 4/17/17 and started pinkish spotting 2 days ago. Pt also reports that she has had clear fluid discharge since yesterday morning when she woke up her underwear was wet. Pt states the panty liner became saturated yesterday.  Pt

## 2017-05-15 ENCOUNTER — OFFICE VISIT (OUTPATIENT)
Dept: OBGYN CLINIC | Facility: CLINIC | Age: 27
End: 2017-05-15

## 2017-05-15 VITALS — DIASTOLIC BLOOD PRESSURE: 75 MMHG | HEART RATE: 80 BPM | SYSTOLIC BLOOD PRESSURE: 109 MMHG

## 2017-05-15 DIAGNOSIS — D06.1 CARCINOMA IN SITU OF EXOCERVIX: Primary | ICD-10-CM

## 2017-05-15 PROCEDURE — 99024 POSTOP FOLLOW-UP VISIT: CPT | Performed by: OBSTETRICS & GYNECOLOGY

## 2017-05-15 NOTE — PROGRESS NOTES
Cristina Patel is a 23year old female [id number] Patient's last menstrual period was 2017. Patient presents with:  Gyn Problem: LEEP F/UP  .      OBSTETRICS HISTORY:  Obstetric History     T1    TAB0  SAB0  E0  M0  L1       GYNE HISTORY: prescriptions:   •  diazepam (VALIUM) 5 MG Oral Tab, Take 5 mg by mouth nightly as needed.   , Disp: , Rfl: 0    ALLERGIES:  No Known Allergies      Review of Systems:  Constitutional:  Denies fatigue, night sweats, hot flashes  Cardiovascular:  denies ches currently -- prior frandy w/ GI upset. Did well w/ nuvaring. Uncertain at this time.

## 2017-06-22 ENCOUNTER — LAB ENCOUNTER (OUTPATIENT)
Dept: LAB | Age: 27
End: 2017-06-22
Attending: OBSTETRICS & GYNECOLOGY
Payer: MEDICAID

## 2017-06-22 ENCOUNTER — OFFICE VISIT (OUTPATIENT)
Dept: OBGYN CLINIC | Facility: CLINIC | Age: 27
End: 2017-06-22

## 2017-06-22 VITALS
WEIGHT: 108 LBS | HEART RATE: 89 BPM | DIASTOLIC BLOOD PRESSURE: 65 MMHG | SYSTOLIC BLOOD PRESSURE: 100 MMHG | BODY MASS INDEX: 19 KG/M2

## 2017-06-22 DIAGNOSIS — Z30.09 BIRTH CONTROL COUNSELING: Primary | ICD-10-CM

## 2017-06-22 DIAGNOSIS — Z11.3 SCREEN FOR STD (SEXUALLY TRANSMITTED DISEASE): ICD-10-CM

## 2017-06-22 PROCEDURE — 87389 HIV-1 AG W/HIV-1&-2 AB AG IA: CPT

## 2017-06-22 PROCEDURE — 86803 HEPATITIS C AB TEST: CPT

## 2017-06-22 PROCEDURE — 87340 HEPATITIS B SURFACE AG IA: CPT

## 2017-06-22 PROCEDURE — 99213 OFFICE O/P EST LOW 20 MIN: CPT | Performed by: OBSTETRICS & GYNECOLOGY

## 2017-06-22 PROCEDURE — 86780 TREPONEMA PALLIDUM: CPT

## 2017-06-22 PROCEDURE — 36415 COLL VENOUS BLD VENIPUNCTURE: CPT

## 2017-06-22 RX ORDER — BUPROPION HYDROCHLORIDE 150 MG/1
TABLET ORAL NIGHTLY
Refills: 2 | COMMUNITY
Start: 2017-06-19

## 2017-06-22 RX ORDER — NORETHINDRONE ACETATE AND ETHINYL ESTRADIOL 1MG-20(21)
1 KIT ORAL DAILY
Qty: 1 PACKAGE | Refills: 3 | Status: SHIPPED | OUTPATIENT
Start: 2017-06-22 | End: 2017-09-18

## 2017-06-22 NOTE — PROGRESS NOTES
Beatriz Moreno is a 33year old female [id number] Patient's last menstrual period was 06/22/2017.  Patient presents with:  Gyn Problem: BC CONSULT -- used nuvaring in past -- xs irritation; also frandy but does not recall if issues; having xs PMS Sx  STD: wis Diet No    Back Care No    Exercise Yes    Comment: ocassional    Seat Belt Yes     Social History Narrative       MEDICATIONS:    Current outpatient prescriptions:   •  Norethin Ace-Eth Estrad-FE (LOESTRIN FE 1/20) 1-20 MG-MCG Oral Tab, Take 1 tablet by m than 50% of the time was spent counseling the patient and/or coordinating care.

## 2017-07-05 ENCOUNTER — HOSPITAL ENCOUNTER (OUTPATIENT)
Age: 27
Discharge: HOME | End: 2017-07-05
Payer: COMMERCIAL

## 2017-07-05 VITALS
HEART RATE: 85 BPM | WEIGHT: 107 LBS | RESPIRATION RATE: 18 BRPM | HEIGHT: 63 IN | DIASTOLIC BLOOD PRESSURE: 65 MMHG | SYSTOLIC BLOOD PRESSURE: 107 MMHG | TEMPERATURE: 99 F | OXYGEN SATURATION: 99 % | BODY MASS INDEX: 18.96 KG/M2

## 2017-07-05 DIAGNOSIS — J02.0 STREPTOCOCCAL SORE THROAT: Primary | ICD-10-CM

## 2017-07-05 LAB — S PYO AG THROAT QL: POSITIVE

## 2017-07-05 PROCEDURE — 99214 OFFICE O/P EST MOD 30 MIN: CPT

## 2017-07-05 PROCEDURE — 99213 OFFICE O/P EST LOW 20 MIN: CPT

## 2017-07-05 PROCEDURE — 87430 STREP A AG IA: CPT

## 2017-07-05 RX ORDER — AMOXICILLIN 875 MG/1
875 TABLET, COATED ORAL 2 TIMES DAILY
Qty: 20 TABLET | Refills: 0 | Status: SHIPPED | OUTPATIENT
Start: 2017-07-05 | End: 2017-07-15

## 2017-07-05 NOTE — ED PROVIDER NOTES
Patient presents with:  Sore Throat      HPI:     Isaias Fry is a 33year old female who presents for evaluation of a chief complaint of sore throat and body aches since yesterday. No fevers or chills. No cough or congestion.   No difficulty swall rashes  NECK: supple, no adenopathy, no thyromegaly  CARDIO: RRR without murmur  EXTREMITIES: no cyanosis, clubbing or edema  HEAD: normocephalic, atraumatic  EYES: sclera non icteric bilateral, conjunctiva clear  EARS: TM  bilateral: normal  NOSE: nasal t

## 2017-07-05 NOTE — ED INITIAL ASSESSMENT (HPI)
Started with sore throat and fatigue last night, worse today. States \"feel like a bus hit me\". No fever. No N/V/D. Painful to swallow.

## 2017-08-28 ENCOUNTER — APPOINTMENT (OUTPATIENT)
Dept: LAB | Age: 27
End: 2017-08-28
Attending: INTERNAL MEDICINE
Payer: COMMERCIAL

## 2017-08-28 ENCOUNTER — OFFICE VISIT (OUTPATIENT)
Dept: INTERNAL MEDICINE CLINIC | Facility: CLINIC | Age: 27
End: 2017-08-28

## 2017-08-28 VITALS
TEMPERATURE: 99 F | DIASTOLIC BLOOD PRESSURE: 71 MMHG | BODY MASS INDEX: 19.31 KG/M2 | HEIGHT: 63 IN | SYSTOLIC BLOOD PRESSURE: 116 MMHG | WEIGHT: 109 LBS | HEART RATE: 83 BPM

## 2017-08-28 DIAGNOSIS — R09.82 POST-NASAL DRIP: ICD-10-CM

## 2017-08-28 DIAGNOSIS — Z11.3 SCREENING EXAMINATION FOR STD (SEXUALLY TRANSMITTED DISEASE): Primary | ICD-10-CM

## 2017-08-28 DIAGNOSIS — Z11.3 SCREENING EXAMINATION FOR STD (SEXUALLY TRANSMITTED DISEASE): ICD-10-CM

## 2017-08-28 PROCEDURE — 87340 HEPATITIS B SURFACE AG IA: CPT

## 2017-08-28 PROCEDURE — 87389 HIV-1 AG W/HIV-1&-2 AB AG IA: CPT

## 2017-08-28 PROCEDURE — 87491 CHLMYD TRACH DNA AMP PROBE: CPT

## 2017-08-28 PROCEDURE — 87591 N.GONORRHOEAE DNA AMP PROB: CPT

## 2017-08-28 PROCEDURE — 99213 OFFICE O/P EST LOW 20 MIN: CPT | Performed by: INTERNAL MEDICINE

## 2017-08-28 PROCEDURE — 86803 HEPATITIS C AB TEST: CPT

## 2017-08-28 PROCEDURE — 99212 OFFICE O/P EST SF 10 MIN: CPT | Performed by: INTERNAL MEDICINE

## 2017-08-28 PROCEDURE — 86780 TREPONEMA PALLIDUM: CPT

## 2017-08-28 PROCEDURE — 36415 COLL VENOUS BLD VENIPUNCTURE: CPT

## 2017-08-28 NOTE — PROGRESS NOTES
Silke Nj is a 33year old female.   Patient presents with:  Cold: ear pressure and post nasal drainage       HPI:   Pt is a 34 yo woman comes with c/o ear problems but then received a text that her boyfriend of 2 yrs was just diag with \"oral syph weekend       REVIEW OF SYSTEMS:   GENERAL HEALTH: No fevers, chills, sweats, fatigue  VISION: No recent vision problems, blurry vision or double vision  HEENT: No decreased hearing or ear pain just a fullness in the right ear , no nasal congestion or sore

## 2017-08-28 NOTE — PATIENT INSTRUCTIONS
What Are Sexually Transmitted Diseases (STDs)? A sexually transmitted disease (STD) is a disease that is spread during sex.  (An STD can also be called STI for sexually transmitted infection.) You can catch an STD if you have sex with someone who has an The only sure way to know if you have an STD is to get checked by a healthcare provider. If you notice a change in how your body looks or feels, have it checked out. But keep in mind, STDs don’t always show symptoms.  So if you’re at risk of STDs, get check People don’t talk as much about syphilis today as they did in the past. But people still become infected with syphilis, and it can cause very serious problems. Symptoms  Syphilis progresses in stages.  The signs and symptoms in each stage may be so mild t A sexually transmitted disease (STD) is a disease that is spread during sex. (An STD can also be called STI for sexually transmitted infection.) You can catch an STD if you have sex with someone who has an STD.  Any sex that involves the penis, vagina, anus

## 2017-08-29 LAB
C TRACH DNA SPEC QL NAA+PROBE: NEGATIVE
HBV SURFACE AG SERPL QL IA: NONREACTIVE
HCV AB SERPL QL IA: NONREACTIVE
HIV1+2 AB SERPL QL IA: NONREACTIVE
N GONORRHOEA DNA SPEC QL NAA+PROBE: NEGATIVE

## 2017-08-30 ENCOUNTER — TELEPHONE (OUTPATIENT)
Dept: INTERNAL MEDICINE CLINIC | Facility: CLINIC | Age: 27
End: 2017-08-30

## 2017-08-30 LAB — T PALLIDUM AB SER QL: NEGATIVE

## 2017-08-30 NOTE — TELEPHONE ENCOUNTER
Pt calling and would like to review the results with a nurse. Please advise. Written by Beatrice Lanier MD on 8/30/2017  1:51 PM   Dear Yamil Mclean,     Your test results are within normal limits. The only one pending is the syphilis.  I will let

## 2017-09-18 ENCOUNTER — LAB ENCOUNTER (OUTPATIENT)
Dept: LAB | Facility: HOSPITAL | Age: 27
End: 2017-09-18
Attending: OBSTETRICS & GYNECOLOGY
Payer: COMMERCIAL

## 2017-09-18 ENCOUNTER — OFFICE VISIT (OUTPATIENT)
Dept: OBGYN CLINIC | Facility: CLINIC | Age: 27
End: 2017-09-18

## 2017-09-18 VITALS
HEART RATE: 74 BPM | HEIGHT: 63.4 IN | WEIGHT: 106 LBS | SYSTOLIC BLOOD PRESSURE: 106 MMHG | BODY MASS INDEX: 18.55 KG/M2 | DIASTOLIC BLOOD PRESSURE: 68 MMHG

## 2017-09-18 DIAGNOSIS — Z30.41 ORAL CONTRACEPTIVE PILL SURVEILLANCE: ICD-10-CM

## 2017-09-18 DIAGNOSIS — Z20.2 POSSIBLE EXPOSURE TO STD: ICD-10-CM

## 2017-09-18 DIAGNOSIS — Z01.419 ENCOUNTER FOR GYNECOLOGICAL EXAMINATION WITHOUT ABNORMAL FINDING: Primary | ICD-10-CM

## 2017-09-18 DIAGNOSIS — Z76.0 MEDICATION REFILL: ICD-10-CM

## 2017-09-18 PROCEDURE — 99395 PREV VISIT EST AGE 18-39: CPT | Performed by: OBSTETRICS & GYNECOLOGY

## 2017-09-18 PROCEDURE — 36415 COLL VENOUS BLD VENIPUNCTURE: CPT

## 2017-09-18 PROCEDURE — 86780 TREPONEMA PALLIDUM: CPT

## 2017-09-18 RX ORDER — NORETHINDRONE ACETATE AND ETHINYL ESTRADIOL 1MG-20(21)
1 KIT ORAL DAILY
Qty: 1 PACKAGE | Refills: 12 | Status: SHIPPED | OUTPATIENT
Start: 2017-09-18 | End: 2018-04-16

## 2017-09-18 RX ORDER — VALACYCLOVIR HYDROCHLORIDE 500 MG/1
500 TABLET, FILM COATED ORAL 2 TIMES DAILY
Qty: 30 TABLET | Refills: 0 | Status: SHIPPED | OUTPATIENT
Start: 2017-09-18 | End: 2017-09-21

## 2017-09-19 NOTE — PROGRESS NOTES
Lizeth Lewis is a 33year old female [id number] Patient's last menstrual period was 09/11/2017.  Patient presents with:  Gyn Exam: Annual  Refill Request: OCP  Medication Request: wishes for refill of valtrex -- gets outbreaks at end of period for 3 days 0.25 Packs/day  For 10.00 Years     Types: Cigarettes    Smokeless tobacco: Never Used    Comment: 5 cigarettes per day    Alcohol use Yes  0.6 - 1.2 oz/week    1 - 2 Standard drinks or equivalent per week         Comment: 2-3 drinks every weekend    Drug excessive thirst or urination. Heme/Lymph:  denies history of anemia, easy bruising or bleeding.       PHYSICAL EXAM:   Constitutional: well developed, well nourished  Head/Face: normocephalic  Neck/Thyroid: thyroid symmetric, no thyromegaly, no nodules, n ocps refilled x one year. Valtrex refilled for prn outbreaks. Consider continous ocps to see if decreases outbreaks. Check Trep again.

## 2017-09-20 LAB
C TRACH DNA SPEC QL NAA+PROBE: NEGATIVE
N GONORRHOEA DNA SPEC QL NAA+PROBE: NEGATIVE
T PALLIDUM AB SER QL: NEGATIVE
T VAGINALIS RRNA SPEC QL NAA+PROBE: NEGATIVE

## 2017-09-21 ENCOUNTER — HOSPITAL ENCOUNTER (OUTPATIENT)
Age: 27
Discharge: HOME | End: 2017-09-21
Attending: FAMILY MEDICINE
Payer: COMMERCIAL

## 2017-09-21 VITALS
DIASTOLIC BLOOD PRESSURE: 81 MMHG | HEIGHT: 63 IN | SYSTOLIC BLOOD PRESSURE: 125 MMHG | TEMPERATURE: 98 F | RESPIRATION RATE: 17 BRPM | OXYGEN SATURATION: 100 % | BODY MASS INDEX: 18.78 KG/M2 | HEART RATE: 76 BPM | WEIGHT: 106 LBS

## 2017-09-21 DIAGNOSIS — J06.9 VIRAL URI WITH COUGH: Primary | ICD-10-CM

## 2017-09-21 PROCEDURE — 99213 OFFICE O/P EST LOW 20 MIN: CPT

## 2017-09-21 PROCEDURE — 99214 OFFICE O/P EST MOD 30 MIN: CPT

## 2017-09-21 RX ORDER — FLUTICASONE PROPIONATE 50 MCG
2 SPRAY, SUSPENSION (ML) NASAL DAILY
Qty: 16 G | Refills: 0 | Status: SHIPPED | OUTPATIENT
Start: 2017-09-21 | End: 2017-10-21

## 2017-09-21 NOTE — ED PROVIDER NOTES
Patient presents with:  Cough/URI      HPI:     Vannesa Summers is a 33year old female who presents with a chief complaint of cough and runny nose for the last 3 weeks.   Patient reports symptoms initially started with left ear pain was seen by her prim Patient is nontoxic appearing on examination. She is afebrile, easy respirations lungs clear bilaterally. No cough noted on examination. Will give patient prescription for Flonase as well as instructed in over-the-counter allergy medications.   Patient t

## 2017-09-21 NOTE — ED INITIAL ASSESSMENT (HPI)
PATIENT ARRIVED AMBULATORY TO ROOM C/O NASAL CONGESTION AND A CONGESTED COUGH X2 WEEKS. PATIENT STATES \"I USUALLY GET BRONCHITIS SEASONAL\" NO N/V/D. EASY NON LABORED RESPIRATIONS.

## 2017-09-26 ENCOUNTER — TELEPHONE (OUTPATIENT)
Dept: OBGYN CLINIC | Facility: CLINIC | Age: 27
End: 2017-09-26

## 2017-09-26 NOTE — TELEPHONE ENCOUNTER
The pt is returning a nurse's call, and states that a detailed v/m can be left at [phone]. Please advise.

## 2017-09-26 NOTE — TELEPHONE ENCOUNTER
----- Message from Sanya Metcalf MD sent at 9/21/2017  1:41 PM CDT -----  Please call pt and inform her of results attached -- neg syphillus, GC/Chl/Trich

## 2017-10-05 RX ORDER — VALACYCLOVIR HYDROCHLORIDE 500 MG/1
TABLET, FILM COATED ORAL
Qty: 30 TABLET | Refills: 0 | OUTPATIENT
Start: 2017-10-05

## 2017-10-09 NOTE — TELEPHONE ENCOUNTER
PER PT STATE SHE WAS SEEN ON 09/18/17 FOR PX / PT STATE DR CARRASCO WAS SUPPOSE TO SEND IN A REFILL ON B/C PILLS  /  Zollie Hidden  / BUT IT WAS NOT SEND IN / PT DO HAVE [employer] / PT STATE DR WAS SUPPOSE IT SEND THAT IN UNTIL SHE CHANGE HER INS / PLS ADV

## 2017-10-10 NOTE — TELEPHONE ENCOUNTER
Jackie states pt has refills available on loestrin. Valtrex rx was received and pt picked it up on 9/18 per Jackie. Pt doesn't recall picking up valtrex. Will call back if any further questions.

## 2018-01-06 ENCOUNTER — TELEPHONE (OUTPATIENT)
Dept: OBGYN CLINIC | Facility: CLINIC | Age: 28
End: 2018-01-06

## 2018-01-06 RX ORDER — VALACYCLOVIR HYDROCHLORIDE 500 MG/1
500 TABLET, FILM COATED ORAL 2 TIMES DAILY
Qty: 6 TABLET | Refills: 0 | Status: SHIPPED | OUTPATIENT
Start: 2018-01-06 | End: 2018-04-16

## 2018-01-06 NOTE — TELEPHONE ENCOUNTER
Pt calling for refill of Valtrex refill. Pt stated she gets outbreaks every month after her period. Pt informed that message will be reviewed with MD on call to see if OK for refill in NJGs absence. Pt verbalized understanding. Message to [facility] on call.

## 2018-01-06 NOTE — TELEPHONE ENCOUNTER
Pt informed of JLKs recs below. RX sent. Pt informed that message will be sent to [facility] for refills for future outbreaks. LUNA--DOMINIC pt stated that she gets monthly outbreaks after her period.

## 2018-01-16 ENCOUNTER — HOSPITAL ENCOUNTER (OUTPATIENT)
Age: 28
Discharge: HOME | End: 2018-01-16
Payer: MEDICAID

## 2018-01-16 VITALS
RESPIRATION RATE: 16 BRPM | TEMPERATURE: 99 F | OXYGEN SATURATION: 100 % | HEIGHT: 63 IN | BODY MASS INDEX: 17.72 KG/M2 | HEART RATE: 85 BPM | DIASTOLIC BLOOD PRESSURE: 71 MMHG | SYSTOLIC BLOOD PRESSURE: 124 MMHG | WEIGHT: 100 LBS

## 2018-01-16 DIAGNOSIS — J02.9 ACUTE VIRAL PHARYNGITIS: Primary | ICD-10-CM

## 2018-01-16 LAB — S PYO AG THROAT QL: NEGATIVE

## 2018-01-16 PROCEDURE — 99212 OFFICE O/P EST SF 10 MIN: CPT

## 2018-01-16 PROCEDURE — 87430 STREP A AG IA: CPT

## 2018-01-16 PROCEDURE — 99213 OFFICE O/P EST LOW 20 MIN: CPT

## 2018-01-16 NOTE — ED INITIAL ASSESSMENT (HPI)
PATIENT ARRIVED AMBULATORY TO ROOM. PATIENT STATES \"MY LEFT TONSIL SEEMS REALLY SWOLLEN. I WANTED TO MAKE SURE I DONT HAVE STREP\" PATIENT DENIES SORE THROAT. NO FEVERS. NO N/V/D. EASY NON LABORED RESPIRATIONS.

## 2018-01-16 NOTE — ED PROVIDER NOTES
Patient Seen in: [facility]    History   Patient presents with: Tonsil Problem    Stated Complaint: sore throat    HPI    Patient is a 26-year-old female who presents for evaluation of \"scratchy throat\".   States she lo All other systems reviewed and negative except as noted above.     Physical Exam   ED Triage Vitals [01/16/18 1152]  BP: 124/71  Pulse: 85  Resp: 16  Temp: 98.8 °F (37.1 °C)  Temp src: Oral  SpO2: 100 %  O2 Device: None (Room air)    Current:/71   Pul Addendum: Contacted pt to see how she was feeling today 1/17/18 @ 12:30PM and she states she is feeling better, denies ST or any worsening tonsillar swelling. Appreciated the phone call and told her to contact her ENT if her symptoms worsen.

## 2018-01-18 ENCOUNTER — HOSPITAL ENCOUNTER (OUTPATIENT)
Age: 28
Discharge: HOME | End: 2018-01-18
Payer: MEDICAID

## 2018-01-18 VITALS
TEMPERATURE: 98 F | OXYGEN SATURATION: 100 % | SYSTOLIC BLOOD PRESSURE: 118 MMHG | BODY MASS INDEX: 17.72 KG/M2 | HEART RATE: 85 BPM | DIASTOLIC BLOOD PRESSURE: 70 MMHG | RESPIRATION RATE: 18 BRPM | WEIGHT: 100 LBS | HEIGHT: 63 IN

## 2018-01-18 DIAGNOSIS — J02.0 STREPTOCOCCAL SORE THROAT: Primary | ICD-10-CM

## 2018-01-18 LAB — S PYO AG THROAT QL: POSITIVE

## 2018-01-18 PROCEDURE — 99214 OFFICE O/P EST MOD 30 MIN: CPT

## 2018-01-18 PROCEDURE — 87430 STREP A AG IA: CPT

## 2018-01-18 PROCEDURE — 99213 OFFICE O/P EST LOW 20 MIN: CPT

## 2018-01-18 RX ORDER — AMOXICILLIN 875 MG/1
875 TABLET, COATED ORAL 2 TIMES DAILY
Qty: 20 TABLET | Refills: 0 | Status: SHIPPED | OUTPATIENT
Start: 2018-01-18 | End: 2018-01-28

## 2018-01-18 NOTE — ED INITIAL ASSESSMENT (HPI)
C/O SORE THROAT SINCE Tuesday. WAS SEEN IN THE IC ON Tuesday. STREP SWAB NEGATIVE AT THAT TIME,  SYMPTOMS HAVE WORSENED. PATIENT STATES \"I FEEL LIKE CORETTA BEEN HIT BY A BUS. \"

## 2018-01-18 NOTE — ED PROVIDER NOTES
Patient presents with:  Sore Throat      HPI:     Maynor Shaw is a 33year old female who presents for evaluation of a chief complaint of sore throat for the past 3 days. She has had some body aches. She had some URI symptoms previously.   She was kg   LMP 01/03/2018 (Exact Date)   SpO2 100%   BMI 17.71 kg/m²   GENERAL: well developed, well nourished, well hydrated, no distress  SKIN: good skin turgor, no obvious rashes  NECK: supple, no adenopathy, no thyromegaly. No meningismus.   CARDIO: RRR witho

## 2018-01-29 ENCOUNTER — TELEPHONE (OUTPATIENT)
Dept: OBGYN CLINIC | Facility: CLINIC | Age: 28
End: 2018-01-29

## 2018-01-29 RX ORDER — VALACYCLOVIR HYDROCHLORIDE 500 MG/1
TABLET, FILM COATED ORAL
Qty: 30 TABLET | Refills: 0 | Status: SHIPPED | OUTPATIENT
Start: 2018-01-29 | End: 2018-04-16

## 2018-01-29 NOTE — TELEPHONE ENCOUNTER
Pt stated that she is having a herpes outbreak and is calling for refill of Valtrex. Pt was given refill on 1/6/18. Pt stated that she normally gets outbreaks about 3 days after her periods every month. Pt stated that this month she got strep throat and usually has an outbreak when she gets sick as well. Message to [facility] for refill?

## 2018-01-29 NOTE — TELEPHONE ENCOUNTER
Confirm location as genital -- give 30 pills -- use 500 mg bid x 3d prn outbreak.  Ask pt is wishes to be on daily suppression if not on currently

## 2018-01-29 NOTE — TELEPHONE ENCOUNTER
Pt states that location is on genital. Gave rx for Valtrex 500mg bid x 3d prn outbreak, #30 pills. Asked pt if she wishes to be on daily suppression. Pt states that she wants to come to discuss with [facility]. Offered to make an appt and she stated she does not have her schedule, but will call back to schedule it. Sent to [address] [place] [address] as a FYI.

## 2018-02-28 ENCOUNTER — TELEPHONE (OUTPATIENT)
Dept: OBGYN CLINIC | Facility: CLINIC | Age: 28
End: 2018-02-28

## 2018-02-28 DIAGNOSIS — Z11.3 SCREEN FOR STD (SEXUALLY TRANSMITTED DISEASE): Primary | ICD-10-CM

## 2018-02-28 NOTE — TELEPHONE ENCOUNTER
Pt states that she had unprotected IC with someone. Pt states that she usually uses protection. Pt states the person she had unprotected IC, she found out he was seeing other people also. Sent to [facility] for recs.      If  You STD labs, please let me know bradly

## 2018-03-01 ENCOUNTER — LAB ENCOUNTER (OUTPATIENT)
Dept: LAB | Age: 28
End: 2018-03-01
Attending: OBSTETRICS & GYNECOLOGY
Payer: MEDICAID

## 2018-03-01 DIAGNOSIS — Z11.3 SCREEN FOR STD (SEXUALLY TRANSMITTED DISEASE): ICD-10-CM

## 2018-03-01 PROCEDURE — 87340 HEPATITIS B SURFACE AG IA: CPT

## 2018-03-01 PROCEDURE — 86803 HEPATITIS C AB TEST: CPT

## 2018-03-01 PROCEDURE — 87389 HIV-1 AG W/HIV-1&-2 AB AG IA: CPT

## 2018-03-01 PROCEDURE — 87591 N.GONORRHOEAE DNA AMP PROB: CPT

## 2018-03-01 PROCEDURE — 87491 CHLMYD TRACH DNA AMP PROBE: CPT

## 2018-03-01 PROCEDURE — 36415 COLL VENOUS BLD VENIPUNCTURE: CPT

## 2018-03-01 PROCEDURE — 86780 TREPONEMA PALLIDUM: CPT

## 2018-03-01 NOTE — TELEPHONE ENCOUNTER
Pt has an appt  On 4/16/18 with you for Physical. Pt wants to know if she can go to the lab for before appt on 4/16/18 for STD testing at the lab ? If she can go to the lab, please let us know which labs to have done.

## 2018-03-01 NOTE — TELEPHONE ENCOUNTER
PT NOTIFIED [facility] PLACED ORDER FOR TESTS. ADVISED NOT TO URINATE FOR 1 HOUR PRIOR TO DOING TESTING (GC/CHL) AND TRY TO CATCH THE BEGINNING OF THE STREAM.  LAB HOURS FOR [place] GIVEN.

## 2018-03-02 LAB
C TRACH DNA SPEC QL NAA+PROBE: NEGATIVE
HBV SURFACE AG SERPL QL IA: NONREACTIVE
HCV AB SERPL QL IA: NONREACTIVE
HIV1+2 AB SERPL QL IA: NONREACTIVE
N GONORRHOEA DNA SPEC QL NAA+PROBE: NEGATIVE
T PALLIDUM AB SER QL: NEGATIVE

## 2018-03-23 ENCOUNTER — HOSPITAL ENCOUNTER (OUTPATIENT)
Age: 28
Discharge: HOME | End: 2018-03-23
Payer: MEDICAID

## 2018-03-23 VITALS
WEIGHT: 112 LBS | BODY MASS INDEX: 20 KG/M2 | SYSTOLIC BLOOD PRESSURE: 135 MMHG | DIASTOLIC BLOOD PRESSURE: 77 MMHG | HEART RATE: 72 BPM | TEMPERATURE: 98 F | RESPIRATION RATE: 16 BRPM

## 2018-03-23 DIAGNOSIS — J02.0 STREP PHARYNGITIS: Primary | ICD-10-CM

## 2018-03-23 LAB — S PYO AG THROAT QL: NEGATIVE

## 2018-03-23 PROCEDURE — 87430 STREP A AG IA: CPT

## 2018-03-23 PROCEDURE — 87081 CULTURE SCREEN ONLY: CPT | Performed by: PHYSICIAN ASSISTANT

## 2018-03-23 PROCEDURE — 99214 OFFICE O/P EST MOD 30 MIN: CPT

## 2018-03-23 RX ORDER — AMOXICILLIN 875 MG/1
875 TABLET, COATED ORAL 2 TIMES DAILY
Qty: 20 TABLET | Refills: 0 | Status: SHIPPED | OUTPATIENT
Start: 2018-03-23 | End: 2018-04-02

## 2018-03-23 NOTE — ED PROVIDER NOTES
Patient Seen in: [facility]    History   Patient presents with:  Sore Throat    Stated Complaint: Sore Throat    HPI    Patient is a 50-year-old female with a history of strep who presents for evaluation of sore throat, b stated complaint: Sore Throat  Other systems are as noted in HPI. Constitutional and vital signs reviewed. All other systems reviewed and negative except as noted above.     Physical Exam   ED Triage Vitals [03/23/18 1249]  BP: 135/77  Pulse: 72  Resp diagnosis)    Disposition:  There is no disposition on file for this visit. There is no disposition time on file for this visit.     Follow-up:  Wanda Boucher MD  [id number] [address]  [phone]      As needed        Medications Prescrib

## 2018-03-27 NOTE — PROGRESS NOTES
lmtcb, if pt calls  Can speak to  Triage  Nurse, strep culture  Is negative ,  This is more sensitive  That  Rapid  Strep test , can relate to pt, mos tlikely  Viral illness causing her problem  That usually atekd  5-7  Days  To resolve .

## 2018-04-16 ENCOUNTER — OFFICE VISIT (OUTPATIENT)
Dept: OBGYN CLINIC | Facility: CLINIC | Age: 28
End: 2018-04-16

## 2018-04-16 VITALS
HEIGHT: 63.4 IN | BODY MASS INDEX: 19.42 KG/M2 | HEART RATE: 94 BPM | WEIGHT: 111 LBS | SYSTOLIC BLOOD PRESSURE: 113 MMHG | DIASTOLIC BLOOD PRESSURE: 73 MMHG

## 2018-04-16 DIAGNOSIS — F17.200 SMOKER: ICD-10-CM

## 2018-04-16 DIAGNOSIS — D06.9 CIN III WITH SEVERE DYSPLASIA: ICD-10-CM

## 2018-04-16 DIAGNOSIS — A60.00 RECURRENT GENITAL HERPES: ICD-10-CM

## 2018-04-16 DIAGNOSIS — Z01.419 ENCOUNTER FOR GYNECOLOGICAL EXAMINATION WITHOUT ABNORMAL FINDING: Primary | ICD-10-CM

## 2018-04-16 DIAGNOSIS — Z30.41 ORAL CONTRACEPTIVE PILL SURVEILLANCE: ICD-10-CM

## 2018-04-16 PROCEDURE — 99395 PREV VISIT EST AGE 18-39: CPT | Performed by: OBSTETRICS & GYNECOLOGY

## 2018-04-16 RX ORDER — NORETHINDRONE ACETATE AND ETHINYL ESTRADIOL 1MG-20(21)
1 KIT ORAL DAILY
Qty: 1 PACKAGE | Refills: 12 | Status: SHIPPED | OUTPATIENT
Start: 2018-04-16 | End: 2019-04-22

## 2018-04-16 RX ORDER — VALACYCLOVIR HYDROCHLORIDE 500 MG/1
500 TABLET, FILM COATED ORAL DAILY
Qty: 35 TABLET | Refills: 12 | Status: SHIPPED | OUTPATIENT
Start: 2018-04-16 | End: 2019-05-07

## 2018-04-16 NOTE — PROGRESS NOTES
Marcy Campbell is a 33year old female [id number] Patient's last menstrual period was 03/29/2018. Patient presents with:  Gyn Exam: ANNUAL EXAM / BCP AND VALTREX REFILL. Smokes 1/3 ppd. working 2 jobs.  Freq HSV outbreaks started after strep throat infecti Smoking status: Current Some Day Smoker  0.25 Packs/day  For 10.00 Years     Types: Cigarettes    Smokeless tobacco: Never Used    Comment: 5 cigarettes per day    Alcohol use Yes  0.6 - 1.2 oz/week    1 - 2 Standard drinks or equivalent per week         C numbness. Psychiatric:   denies depression or anxiety. Endocrine:     denies excessive thirst or urination. Heme/Lymph:    denies history of anemia, easy bruising or bleeding.       PHYSICAL EXAM:   /73   Pulse 94   Ht 5' 3.4\" (1.61 m)   Wt 111 lb outbreak occurs. Pap w/ HPV done for f/u BESSY 3 on leep last year. Strongly encouraged to stop smoking. ocps refilled x one year. Condoms encouraged. Wishes for GC/Chl/Trich screen -- declines blood testing.   Reviewed how stress / smoking increases

## 2018-04-19 NOTE — PROGRESS NOTES
Send letter: Normal Pap and high risk HPV negative. Negative Gonorrhea / Chlamydia / Trichomonas screening. Continue with annual breast / pelvic exam-- next pap in one year.

## 2018-05-18 ENCOUNTER — LAB ENCOUNTER (OUTPATIENT)
Dept: LAB | Age: 28
End: 2018-05-18
Attending: INTERNAL MEDICINE
Payer: MEDICAID

## 2018-05-18 ENCOUNTER — OFFICE VISIT (OUTPATIENT)
Dept: INTERNAL MEDICINE CLINIC | Facility: CLINIC | Age: 28
End: 2018-05-18

## 2018-05-18 VITALS
HEART RATE: 79 BPM | HEIGHT: 63.4 IN | WEIGHT: 109 LBS | BODY MASS INDEX: 19.07 KG/M2 | SYSTOLIC BLOOD PRESSURE: 116 MMHG | DIASTOLIC BLOOD PRESSURE: 79 MMHG

## 2018-05-18 DIAGNOSIS — E55.9 VITAMIN D DEFICIENCY: ICD-10-CM

## 2018-05-18 DIAGNOSIS — M54.2 NECK PAIN: ICD-10-CM

## 2018-05-18 DIAGNOSIS — E78.5 HYPERLIPIDEMIA, UNSPECIFIED HYPERLIPIDEMIA TYPE: ICD-10-CM

## 2018-05-18 DIAGNOSIS — M25.552 LEFT HIP PAIN: Primary | ICD-10-CM

## 2018-05-18 PROCEDURE — 85025 COMPLETE CBC W/AUTO DIFF WBC: CPT

## 2018-05-18 PROCEDURE — 36415 COLL VENOUS BLD VENIPUNCTURE: CPT

## 2018-05-18 PROCEDURE — 99212 OFFICE O/P EST SF 10 MIN: CPT | Performed by: INTERNAL MEDICINE

## 2018-05-18 PROCEDURE — 82306 VITAMIN D 25 HYDROXY: CPT

## 2018-05-18 PROCEDURE — 99214 OFFICE O/P EST MOD 30 MIN: CPT | Performed by: INTERNAL MEDICINE

## 2018-05-18 PROCEDURE — 80061 LIPID PANEL: CPT

## 2018-05-18 NOTE — PROGRESS NOTES
Serafin Moar is a 33year old female.   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

## 2018-07-13 ENCOUNTER — TELEPHONE (OUTPATIENT)
Dept: OBGYN CLINIC | Facility: CLINIC | Age: 28
End: 2018-07-13

## 2018-07-13 DIAGNOSIS — R10.2 PELVIC PRESSURE IN FEMALE: Primary | ICD-10-CM

## 2018-07-13 NOTE — TELEPHONE ENCOUNTER
Yes for u/a with reflex culture & yes for STD orders including urine GC / Chl. -- pt needs to give first catch for urine GC/Chl & then mid stream for u/a

## 2018-07-13 NOTE — TELEPHONE ENCOUNTER
Pt calling to report that for the past week she has been experiencing what she thinks is a UTI but her symptoms are \"not the typical symptoms\". Pt reports pelvic pressure/discomfort before and after urination. Pt stated she has burning after urination.  P

## 2018-07-13 NOTE — TELEPHONE ENCOUNTER
PER PT STATE SHE HAS AN UTI / PT WANT TO KNOW IF SHE COULD GET AN ORDER FOR LABS / OR DO SHE NEED TO SCHEDULE AN APPT / PT ALSO WOULD LIKE A CALL BACK / PT STATE SHE HAS SOME QUESTIONS / PLS ADV

## 2018-07-16 ENCOUNTER — LAB ENCOUNTER (OUTPATIENT)
Dept: LAB | Age: 28
End: 2018-07-16
Attending: OBSTETRICS & GYNECOLOGY
Payer: MEDICAID

## 2018-07-16 DIAGNOSIS — R10.2 PELVIC PRESSURE IN FEMALE: ICD-10-CM

## 2018-07-16 LAB
BILIRUB UR QL: NEGATIVE
CLARITY UR: CLEAR
COLOR UR: YELLOW
GLUCOSE UR-MCNC: NEGATIVE MG/DL
NITRITE UR QL STRIP.AUTO: POSITIVE
PH UR: 5 [PH] (ref 5–8)
PROT UR-MCNC: 30 MG/DL
RBC #/AREA URNS AUTO: 6 /HPF
SP GR UR STRIP: 1.03 (ref 1–1.03)
UROBILINOGEN UR STRIP-ACNC: <2
VIT C UR-MCNC: NEGATIVE MG/DL
WBC #/AREA URNS AUTO: 17 /HPF

## 2018-07-16 PROCEDURE — 87088 URINE BACTERIA CULTURE: CPT

## 2018-07-16 PROCEDURE — 86780 TREPONEMA PALLIDUM: CPT

## 2018-07-16 PROCEDURE — 87591 N.GONORRHOEAE DNA AMP PROB: CPT

## 2018-07-16 PROCEDURE — 87086 URINE CULTURE/COLONY COUNT: CPT

## 2018-07-16 PROCEDURE — 87389 HIV-1 AG W/HIV-1&-2 AB AG IA: CPT

## 2018-07-16 PROCEDURE — 87340 HEPATITIS B SURFACE AG IA: CPT

## 2018-07-16 PROCEDURE — 36415 COLL VENOUS BLD VENIPUNCTURE: CPT

## 2018-07-16 PROCEDURE — 87491 CHLMYD TRACH DNA AMP PROBE: CPT

## 2018-07-16 PROCEDURE — 86803 HEPATITIS C AB TEST: CPT

## 2018-07-16 PROCEDURE — 87186 SC STD MICRODIL/AGAR DIL: CPT

## 2018-07-16 PROCEDURE — 81001 URINALYSIS AUTO W/SCOPE: CPT

## 2018-07-16 NOTE — TELEPHONE ENCOUNTER
PT NOTIFIED ORDER HAS BEEN PLACED. ADVISED TO CATCH BEGINNING OF URINE FOR GC/CHL AND MID STREAM FOR UA. ALSO ADVISED NOT TO URINATE FOR  AT LEAST AN HOUR PRIOR TO DOING THE SPECIMEN. PT VERBALIZED UNDERSTANDING.

## 2018-07-18 ENCOUNTER — TELEPHONE (OUTPATIENT)
Dept: OBGYN CLINIC | Facility: CLINIC | Age: 28
End: 2018-07-18

## 2018-07-18 LAB — T PALLIDUM AB SER QL: NEGATIVE

## 2018-07-18 RX ORDER — NITROFURANTOIN 25; 75 MG/1; MG/1
100 CAPSULE ORAL 2 TIMES DAILY
Qty: 14 CAPSULE | Refills: 0 | Status: SHIPPED | OUTPATIENT
Start: 2018-07-18 | End: 2018-07-25

## 2018-07-18 NOTE — TELEPHONE ENCOUNTER
Pt informed of results and recs below. Rectal hygiene reviewed with pt. Pt verbalzied understanding. Pharmacy confirmed and Rx sent.

## 2018-08-11 ENCOUNTER — HOSPITAL ENCOUNTER (EMERGENCY)
Facility: HOSPITAL | Age: 28
Discharge: HOME | End: 2018-08-12
Attending: EMERGENCY MEDICINE
Payer: MEDICAID

## 2018-08-11 DIAGNOSIS — K50.90 EXACERBATION OF CROHN'S DISEASE WITHOUT COMPLICATION (HCC): Primary | ICD-10-CM

## 2018-08-11 LAB — B-HCG UR QL: NEGATIVE

## 2018-08-11 PROCEDURE — 96361 HYDRATE IV INFUSION ADD-ON: CPT

## 2018-08-11 PROCEDURE — 81025 URINE PREGNANCY TEST: CPT

## 2018-08-11 PROCEDURE — 96376 TX/PRO/DX INJ SAME DRUG ADON: CPT

## 2018-08-11 PROCEDURE — 83690 ASSAY OF LIPASE: CPT | Performed by: EMERGENCY MEDICINE

## 2018-08-11 PROCEDURE — 96374 THER/PROPH/DIAG INJ IV PUSH: CPT

## 2018-08-11 PROCEDURE — 99285 EMERGENCY DEPT VISIT HI MDM: CPT

## 2018-08-11 PROCEDURE — 96375 TX/PRO/DX INJ NEW DRUG ADDON: CPT

## 2018-08-11 PROCEDURE — 85025 COMPLETE CBC W/AUTO DIFF WBC: CPT | Performed by: EMERGENCY MEDICINE

## 2018-08-11 PROCEDURE — 80076 HEPATIC FUNCTION PANEL: CPT | Performed by: EMERGENCY MEDICINE

## 2018-08-11 PROCEDURE — 80048 BASIC METABOLIC PNL TOTAL CA: CPT | Performed by: EMERGENCY MEDICINE

## 2018-08-11 PROCEDURE — 81001 URINALYSIS AUTO W/SCOPE: CPT | Performed by: EMERGENCY MEDICINE

## 2018-08-11 RX ORDER — MORPHINE SULFATE 4 MG/ML
4 INJECTION, SOLUTION INTRAMUSCULAR; INTRAVENOUS ONCE
Status: COMPLETED | OUTPATIENT
Start: 2018-08-11 | End: 2018-08-11

## 2018-08-12 ENCOUNTER — APPOINTMENT (OUTPATIENT)
Dept: CT IMAGING | Facility: HOSPITAL | Age: 28
End: 2018-08-12
Attending: EMERGENCY MEDICINE
Payer: MEDICAID

## 2018-08-12 ENCOUNTER — HOSPITAL ENCOUNTER (OUTPATIENT)
Age: 28
Discharge: HOME | End: 2018-08-12
Attending: EMERGENCY MEDICINE
Payer: MEDICAID

## 2018-08-12 VITALS
DIASTOLIC BLOOD PRESSURE: 80 MMHG | SYSTOLIC BLOOD PRESSURE: 121 MMHG | TEMPERATURE: 98 F | HEART RATE: 98 BPM | RESPIRATION RATE: 16 BRPM | OXYGEN SATURATION: 100 % | HEIGHT: 63 IN | BODY MASS INDEX: 19.49 KG/M2 | WEIGHT: 110 LBS

## 2018-08-12 VITALS
BODY MASS INDEX: 19.49 KG/M2 | TEMPERATURE: 98 F | HEART RATE: 65 BPM | SYSTOLIC BLOOD PRESSURE: 121 MMHG | OXYGEN SATURATION: 100 % | HEIGHT: 63 IN | WEIGHT: 110 LBS | RESPIRATION RATE: 16 BRPM | DIASTOLIC BLOOD PRESSURE: 81 MMHG

## 2018-08-12 DIAGNOSIS — N30.90 CYSTITIS: Primary | ICD-10-CM

## 2018-08-12 LAB
ALBUMIN SERPL BCP-MCNC: 4.2 G/DL (ref 3.5–4.8)
ALP SERPL-CCNC: 44 U/L (ref 32–100)
ALT SERPL-CCNC: 16 U/L (ref 14–54)
ANION GAP SERPL CALC-SCNC: 13 MMOL/L (ref 0–18)
AST SERPL-CCNC: 19 U/L (ref 15–41)
B-HCG UR QL: NEGATIVE
BACTERIA UR QL AUTO: NEGATIVE /HPF
BASOPHILS # BLD: 0 K/UL (ref 0–0.2)
BASOPHILS NFR BLD: 0 %
BILIRUB DIRECT SERPL-MCNC: 0.1 MG/DL (ref 0–0.2)
BILIRUB SERPL-MCNC: 0.4 MG/DL (ref 0.3–1.2)
BILIRUB UR QL STRIP: NEGATIVE
BILIRUB UR QL: NEGATIVE
BUN SERPL-MCNC: 6 MG/DL (ref 8–20)
BUN/CREAT SERPL: 9.7 (ref 10–20)
CALCIUM SERPL-MCNC: 9 MG/DL (ref 8.5–10.5)
CHLORIDE SERPL-SCNC: 108 MMOL/L (ref 95–110)
CLARITY UR: CLEAR
CLARITY UR: CLEAR
CO2 SERPL-SCNC: 18 MMOL/L (ref 22–32)
COLOR UR: YELLOW
COLOR UR: YELLOW
CREAT SERPL-MCNC: 0.62 MG/DL (ref 0.5–1.5)
EOSINOPHIL # BLD: 0.1 K/UL (ref 0–0.7)
EOSINOPHIL NFR BLD: 2 %
ERYTHROCYTE [DISTWIDTH] IN BLOOD BY AUTOMATED COUNT: 12.4 % (ref 11–15)
GLUCOSE SERPL-MCNC: 89 MG/DL (ref 70–99)
GLUCOSE UR STRIP-MCNC: NEGATIVE MG/DL
GLUCOSE UR-MCNC: NEGATIVE MG/DL
HCT VFR BLD AUTO: 44.7 % (ref 35–48)
HGB BLD-MCNC: 15.4 G/DL (ref 12–16)
KETONES UR-MCNC: NEGATIVE MG/DL
LEUKOCYTE ESTERASE UR QL STRIP.AUTO: NEGATIVE
LEUKOCYTE ESTERASE UR QL STRIP: NEGATIVE
LIPASE SERPL-CCNC: 23 U/L (ref 22–51)
LYMPHOCYTES # BLD: 2.6 K/UL (ref 1–4)
LYMPHOCYTES NFR BLD: 48 %
MCH RBC QN AUTO: 32.9 PG (ref 27–32)
MCHC RBC AUTO-ENTMCNC: 34.4 G/DL (ref 32–37)
MCV RBC AUTO: 95.5 FL (ref 80–100)
MONOCYTES # BLD: 0.3 K/UL (ref 0–1)
MONOCYTES NFR BLD: 6 %
NEUTROPHILS # BLD AUTO: 2.4 K/UL (ref 1.8–7.7)
NEUTROPHILS NFR BLD: 44 %
NITRITE UR QL STRIP.AUTO: NEGATIVE
NITRITE UR QL STRIP: NEGATIVE
OSMOLALITY UR CALC.SUM OF ELEC: 285 MOSM/KG (ref 275–295)
PH UR STRIP: 5.5 [PH]
PH UR: 5 [PH] (ref 5–8)
PLATELET # BLD AUTO: 200 K/UL (ref 140–400)
PMV BLD AUTO: 9 FL (ref 7.4–10.3)
POTASSIUM SERPL-SCNC: 3.2 MMOL/L (ref 3.3–5.1)
PROT SERPL-MCNC: 7.3 G/DL (ref 5.9–8.4)
PROT UR-MCNC: NEGATIVE MG/DL
RBC # BLD AUTO: 4.68 M/UL (ref 3.7–5.4)
RBC #/AREA URNS AUTO: 2 /HPF
SODIUM SERPL-SCNC: 139 MMOL/L (ref 136–144)
SP GR UR STRIP: 1.01 (ref 1–1.03)
SP GR UR STRIP: >=1.03
UROBILINOGEN UR STRIP-ACNC: <2
UROBILINOGEN UR STRIP-ACNC: <2 MG/DL
VIT C UR-MCNC: NEGATIVE MG/DL
WBC # BLD AUTO: 5.4 K/UL (ref 4–11)
WBC #/AREA URNS AUTO: 1 /HPF

## 2018-08-12 PROCEDURE — 81025 URINE PREGNANCY TEST: CPT

## 2018-08-12 PROCEDURE — 99214 OFFICE O/P EST MOD 30 MIN: CPT

## 2018-08-12 PROCEDURE — 87086 URINE CULTURE/COLONY COUNT: CPT | Performed by: EMERGENCY MEDICINE

## 2018-08-12 PROCEDURE — 74176 CT ABD & PELVIS W/O CONTRAST: CPT | Performed by: EMERGENCY MEDICINE

## 2018-08-12 PROCEDURE — 81003 URINALYSIS AUTO W/O SCOPE: CPT

## 2018-08-12 RX ORDER — ONDANSETRON 2 MG/ML
4 INJECTION INTRAMUSCULAR; INTRAVENOUS ONCE
Status: COMPLETED | OUTPATIENT
Start: 2018-08-12 | End: 2018-08-12

## 2018-08-12 RX ORDER — PREDNISONE 20 MG/1
20 TABLET ORAL DAILY
Qty: 5 TABLET | Refills: 0 | Status: SHIPPED | OUTPATIENT
Start: 2018-08-12 | End: 2018-08-17

## 2018-08-12 RX ORDER — NITROFURANTOIN 25; 75 MG/1; MG/1
100 CAPSULE ORAL 2 TIMES DAILY
Qty: 14 CAPSULE | Refills: 0 | Status: SHIPPED | OUTPATIENT
Start: 2018-08-12 | End: 2018-08-19

## 2018-08-12 RX ORDER — METHYLPREDNISOLONE SODIUM SUCCINATE 125 MG/2ML
125 INJECTION, POWDER, LYOPHILIZED, FOR SOLUTION INTRAMUSCULAR; INTRAVENOUS ONCE
Status: COMPLETED | OUTPATIENT
Start: 2018-08-12 | End: 2018-08-12

## 2018-08-12 RX ORDER — MORPHINE SULFATE 4 MG/ML
4 INJECTION, SOLUTION INTRAMUSCULAR; INTRAVENOUS ONCE
Status: COMPLETED | OUTPATIENT
Start: 2018-08-12 | End: 2018-08-12

## 2018-08-12 NOTE — ED INITIAL ASSESSMENT (HPI)
Seen in ED yesterday for right flank pain. Was unable to eat due to pain. Vomited multiple times during the night. CT scan done, \"OK\". Dx with complications from Crohn's. Woke up this morning and found \"spotting\". Dysuria this morning.  Incontinent of u

## 2018-08-12 NOTE — ED PROVIDER NOTES
Patient Seen in: [facility] AND [facility] Emergency Department    History   Patient presents with:  Abdomen/Flank Pain (GI/)    Stated Complaint: right flank pain x8 hours    HPI    Patient is a 56-year-old female with history of Crohn's disease who presents Temp 98.3 °F (36.8 °C) (Temporal)   Resp 16   Ht 160 cm (5' 3\")   Wt 49.9 kg   LMP 08/10/2018   SpO2 100%   BMI 19.49 kg/m²         Physical Exam    GENERAL: No acute distress, awake and alert  HEENT: MMM, EOMI, PERRL  Neck: supple, non tender  CV: RRR, n hydronephrosis or calcification within collecting system. .  Liver, gallbladder, pancreas and spleen are unremarkable. No free air. Skeleton is intact. Case discussed with  Dr Caleb Silveira @ 1:50 Arthur Ca M.D.     Pt received morphine with

## 2018-08-12 NOTE — ED PROVIDER NOTES
Patient Seen in: [facility]    History   Patient presents with:  Urinary Symptoms (urologic)    Stated Complaint: UTI    HPI  Patient complains of urinary urgency, frequency and dysuria that started this morning.   Elmo Bryant SINUS  No date: REPAIR OF NASAL SEPTUM  No date: SINUS SURGERY        Comment: septoplasty, TBR    Family history reviewed and is not pertinent to presenting problem.     Smoking status: Current Some Day Smoker Neurological: She is alert and oriented to person, place, and time. She has normal strength. Coordination and gait normal.   Skin: Skin is warm and dry. No rash noted. No pallor. Psychiatric: She has a normal mood and affect.  Her behavior is normal.

## 2018-08-12 NOTE — ED NOTES
Pt reports to ED with complaints of R flank/back pain that has become progressively worse since 1pm today. Pt has hist of crohn's but states the pain is different than her crohn's pain. Pt denies N/V/D or fevers.

## 2018-08-28 ENCOUNTER — HOSPITAL ENCOUNTER (OUTPATIENT)
Age: 28
Discharge: HOME | End: 2018-08-28
Attending: FAMILY MEDICINE
Payer: MEDICAID

## 2018-08-28 VITALS
SYSTOLIC BLOOD PRESSURE: 106 MMHG | HEIGHT: 63 IN | DIASTOLIC BLOOD PRESSURE: 68 MMHG | TEMPERATURE: 99 F | BODY MASS INDEX: 19.49 KG/M2 | HEART RATE: 77 BPM | WEIGHT: 110 LBS | OXYGEN SATURATION: 100 % | RESPIRATION RATE: 18 BRPM

## 2018-08-28 DIAGNOSIS — J01.90 ACUTE SINUSITIS, RECURRENCE NOT SPECIFIED, UNSPECIFIED LOCATION: Primary | ICD-10-CM

## 2018-08-28 DIAGNOSIS — J31.0 RHINITIS MEDICAMENTOSA: ICD-10-CM

## 2018-08-28 DIAGNOSIS — T48.5X5A RHINITIS MEDICAMENTOSA: ICD-10-CM

## 2018-08-28 PROCEDURE — 99214 OFFICE O/P EST MOD 30 MIN: CPT

## 2018-08-28 PROCEDURE — 99213 OFFICE O/P EST LOW 20 MIN: CPT

## 2018-08-28 RX ORDER — AMOXICILLIN 875 MG/1
875 TABLET, COATED ORAL 2 TIMES DAILY
Qty: 28 TABLET | Refills: 0 | Status: SHIPPED | OUTPATIENT
Start: 2018-08-28 | End: 2018-09-11

## 2018-08-28 NOTE — ED PROVIDER NOTES
Pt seen in [facility] [place]      Stated Complaint: Patient presents with:  Sinus Problem      --------------   RN assessment:     Sinus congestion for a month--was left side, now all over  --------------    CC: sinus congestion Caffeine Concern No    Sleep Concern No    Stress Concern Yes    Weight Concern No    Special Diet No    Back Care No    Exercise Yes    Comment: ocassional    Seat Belt Yes     Social History Narrative   None on file       Past Medical, Surgical, Family, organomegaly   Extremities:   Extremities normal, atraumatic, no cyanosis or edema   Pulses:   2+ and symmetric all extremities   Neurologic:   CNII-XII intact, normal strength, sensation and reflexes     throughout     ------------------------------------

## 2018-08-28 NOTE — ED INITIAL ASSESSMENT (HPI)
PATIENT ARRIVED AMBULATORY TO ROOM C/O SINUS PRESSURE/CONGESTION INTERMITTENT X1 MONTH. PATIENT STATES \"IT WAS JUST THE LEFT SIDE BUT  NOW ITS ALL OF IT\" +CONGESTED COUGH. NO FEVERS. NO N/V/D. EASY NON LABORED RESPIRATIONS.

## 2018-09-06 ENCOUNTER — TELEPHONE (OUTPATIENT)
Dept: INTERNAL MEDICINE CLINIC | Facility: CLINIC | Age: 28
End: 2018-09-06

## 2018-09-06 DIAGNOSIS — M54.2 NECK PAIN: Primary | ICD-10-CM

## 2018-09-06 DIAGNOSIS — M25.559 ARTHRALGIA OF HIP, UNSPECIFIED LATERALITY: ICD-10-CM

## 2018-09-06 NOTE — TELEPHONE ENCOUNTER
Pt called in requesting to have her original PT order faxed to the [facility] Physical Therapy office. Pt stated the office is requesting her order as it is over 61days old. Pt did not have the fax number for the [facility] Physical Therapy Office.   Please advis

## 2018-09-11 ENCOUNTER — OFFICE VISIT (OUTPATIENT)
Dept: PHYSICAL THERAPY | Age: 28
End: 2018-09-11
Attending: INTERNAL MEDICINE
Payer: MEDICAID

## 2018-09-11 DIAGNOSIS — M54.2 NECK PAIN: ICD-10-CM

## 2018-09-11 DIAGNOSIS — M25.552 LEFT HIP PAIN: ICD-10-CM

## 2018-09-11 PROCEDURE — 97140 MANUAL THERAPY 1/> REGIONS: CPT

## 2018-09-11 PROCEDURE — 97110 THERAPEUTIC EXERCISES: CPT

## 2018-09-11 PROCEDURE — 97161 PT EVAL LOW COMPLEX 20 MIN: CPT

## 2018-09-11 NOTE — PROGRESS NOTES
CERVICAL SPINE EVALUATION:   Referring Physician: Ezequiel Sweet MD    Dx: Neck pain (M54.2)  Arthralgia of hip, unspecified laterality (M25.559) Date of Service: 9/11/2018   Date of Onset: 9 years ago       SUBJECTIVE:   PATIENT SUMMARY:  Kassy Pizarro be very sore and will be unable to walk for days), difficulty falling asleep    Patient specific functional scale:   Work (standing:): 5/10  Lifting/carrying (work): 3/10  Sleep: 6/10    Casandra Beasley describes prior level of function: pain-free and unlimited with Observation/Posture: good posture.     Cervical AROM:  Pain (+/-)   Flexion 52/60 Pulling into L side of neck   Extension 54/70 -   R Sidebend 40/45 +    L Sidebend 43/45 -   R Rotation 72/80 -   L Rotation 75/80 -   Protrusion min -   Retraction min - with contract/relax, kneeling hip flexor stretch, sidely glut med hip abd, standing shoulder extensions alternating RTB, LEIGHTON with scap retract RTB  Charges: PT Eval x1, TherEx x1, Man x1      Total Timed Treatment: 25 min     Total Treatment Time: 50 min furnished under this plan of treatment and while under my care.     X______________________________________________ Date________________  Certification From: 4/56/3877      To: 12/10/2018

## 2018-09-19 ENCOUNTER — OFFICE VISIT (OUTPATIENT)
Dept: PHYSICAL THERAPY | Age: 28
End: 2018-09-19
Attending: INTERNAL MEDICINE
Payer: MEDICAID

## 2018-09-19 PROCEDURE — 97140 MANUAL THERAPY 1/> REGIONS: CPT

## 2018-09-19 PROCEDURE — 97110 THERAPEUTIC EXERCISES: CPT

## 2018-09-19 NOTE — PROGRESS NOTES
Neck pain (M54.2)  Arthralgia of hip, unspecified laterality (M25.559)  Authorized # of Visits:  10         Next MD visit: none scheduled  Fall Risk: standard         Precautions: n/a           Medication Changes since last visit?: No  Subjective: Patient contract/relax, kneeling hip flexor stretch, sidely glut med hip abd, standing shoulder extensions alternating RTB, LEIGHTON with scap retract RTB, bent over HS mobilization, supine DNF    Plan: Assess effect of HEP progressions.  Re-assess soft tissue and hamst

## 2018-09-20 ENCOUNTER — OFFICE VISIT (OUTPATIENT)
Dept: PHYSICAL THERAPY | Age: 28
End: 2018-09-20
Attending: INTERNAL MEDICINE
Payer: MEDICAID

## 2018-09-20 PROCEDURE — 97110 THERAPEUTIC EXERCISES: CPT

## 2018-09-20 NOTE — PROGRESS NOTES
Neck pain (M54.2)  Arthralgia of hip, unspecified laterality (M25.559)  Authorized # of Visits: Eval + 6 (Harjinder Banks Exp. 10/11)        Next MD visit: none scheduled  Fall Risk: standard         Precautions: n/a           Medication Changes since last visit?: N fatigue. Initiated pretzel stretch for added hip mobility and muscle extensibility. Pt reported moderate stretch. Patient Education: Reviewed current program and progressed as indicated above. Handout issued.  GTB issued    Current HEP: posterior capsul

## 2018-09-24 ENCOUNTER — OFFICE VISIT (OUTPATIENT)
Dept: PHYSICAL THERAPY | Age: 28
End: 2018-09-24
Attending: INTERNAL MEDICINE
Payer: MEDICAID

## 2018-09-24 PROCEDURE — 97110 THERAPEUTIC EXERCISES: CPT

## 2018-09-24 PROCEDURE — 97140 MANUAL THERAPY 1/> REGIONS: CPT

## 2018-09-24 NOTE — PROGRESS NOTES
Neck pain (M54.2)  Arthralgia of hip, unspecified laterality (M25.559)  Authorized # of Visits: Eval + 6 (Harjinder Banks Exp. 10/11)        Next MD visit: none scheduled  Fall Risk: standard         Precautions: n/a           Medication Changes since last visit?: N C7   Neuromuscular Re-education DNF activation  Prone: low trap activation    Quadruped: serratus stabilization       Assessment: Focused on cervical dysfunction this visit.  Pt with upper thoracic and lower cervical dysfunction noted which may contribute t

## 2018-09-26 ENCOUNTER — OFFICE VISIT (OUTPATIENT)
Dept: PHYSICAL THERAPY | Age: 28
End: 2018-09-26
Attending: INTERNAL MEDICINE
Payer: MEDICAID

## 2018-09-26 PROCEDURE — 97110 THERAPEUTIC EXERCISES: CPT

## 2018-09-26 PROCEDURE — 97140 MANUAL THERAPY 1/> REGIONS: CPT

## 2018-09-26 NOTE — PROGRESS NOTES
Neck pain (M54.2)  Arthralgia of hip, unspecified laterality (M25.559)  Authorized # of Visits: Eval + 6 (Harjinder Banks Exp. 10/11)        Next MD visit: none scheduled  Fall Risk: standard         Precautions: n/a           Medication Changes since last visit?: N sacral torsion    Prone: myofascial release at piriformis insertion    Supine: hip joint distraction R/L    Supine: hip AP mob R    Seated MET for ERSL at T12    Supine 1st rib mob and myofascial release R    Supine suboccipital release    Supine OA mob L prolonged standing  6. Pt will demo improved B hip AROM by at least 50% for all deficit motions to allow for normalized muscle function with daily activity. Charges:  TherEx x2, Man x1       Total Timed Treatment: 40 min  Total Treatment Time: 40

## 2018-10-01 ENCOUNTER — OFFICE VISIT (OUTPATIENT)
Dept: PHYSICAL THERAPY | Age: 28
End: 2018-10-01
Attending: INTERNAL MEDICINE
Payer: MEDICAID

## 2018-10-01 PROCEDURE — 97110 THERAPEUTIC EXERCISES: CPT

## 2018-10-01 NOTE — PROGRESS NOTES
Neck pain (M54.2)  Arthralgia of hip, unspecified laterality (M25.559)  Authorized # of Visits: Eval + 6 (Harjinder Banks Exp. 10/11)        Next MD visit: none scheduled  Fall Risk: standard         Precautions: n/a           Medication Changes since last visit?: N GTB [id number]    Standing hamstring mobilization 10x       Manual Therapy Prone: piriformis release B Prone: myofascial release at piriformis insertion Seated: MET for upper thoracic ERS dysfunction    Supine: 1st rib mobs and myofascial release B    Supin will demo improved B hip AROM by at least 50% for all deficit motions to allow for normalized muscle function with daily activity. Charges:  TherEx x3       Total Timed Treatment: 40 min  Total Treatment Time: 40 min

## 2018-10-03 ENCOUNTER — OFFICE VISIT (OUTPATIENT)
Dept: PHYSICAL THERAPY | Age: 28
End: 2018-10-03
Attending: INTERNAL MEDICINE
Payer: MEDICAID

## 2018-10-03 ENCOUNTER — TELEPHONE (OUTPATIENT)
Dept: PHYSICAL THERAPY | Age: 28
End: 2018-10-03

## 2018-10-03 PROCEDURE — 97110 THERAPEUTIC EXERCISES: CPT

## 2018-10-03 NOTE — PROGRESS NOTES
Patient Name: Ronda Ladd, : 1990, MRN: [id number]   Date:  10/3/2018  Referring Physician:  Constance Tiwari    Diagnosis: Neck pain (M54.2)  Arthralgia of hip, unspecified laterality (M25.559)    Discharge Summary    Pt has attended 7 visits ER 5/5 5/5   Hip IR 5/5 5/5   Knee Flex 5/5 5/5   Knee ext 5/5 5/5   Glut med 4-/5 4/5      Flexibility:     R L   Upper trap Min restrict min restrict   Levator scap min restrict min restrict   Suboccipitals minrestrict min restrict      Joint Mobility: Shellice Oliva Libman, PT,DPT,Paloma Raya

## 2018-10-23 ENCOUNTER — APPOINTMENT (OUTPATIENT)
Dept: GENERAL RADIOLOGY | Facility: HOSPITAL | Age: 28
End: 2018-10-23
Attending: NURSE PRACTITIONER
Payer: MEDICAID

## 2018-10-23 ENCOUNTER — HOSPITAL ENCOUNTER (EMERGENCY)
Facility: HOSPITAL | Age: 28
Discharge: HOME | End: 2018-10-23
Payer: MEDICAID

## 2018-10-23 VITALS
TEMPERATURE: 98 F | SYSTOLIC BLOOD PRESSURE: 103 MMHG | HEART RATE: 64 BPM | BODY MASS INDEX: 19.31 KG/M2 | HEIGHT: 63 IN | WEIGHT: 109 LBS | OXYGEN SATURATION: 100 % | DIASTOLIC BLOOD PRESSURE: 69 MMHG | RESPIRATION RATE: 20 BRPM

## 2018-10-23 DIAGNOSIS — J06.9 UPPER RESPIRATORY TRACT INFECTION, UNSPECIFIED TYPE: ICD-10-CM

## 2018-10-23 DIAGNOSIS — H66.90 ACUTE OTITIS MEDIA, UNSPECIFIED OTITIS MEDIA TYPE: Primary | ICD-10-CM

## 2018-10-23 PROCEDURE — 99284 EMERGENCY DEPT VISIT MOD MDM: CPT

## 2018-10-23 PROCEDURE — 94640 AIRWAY INHALATION TREATMENT: CPT

## 2018-10-23 PROCEDURE — 71046 X-RAY EXAM CHEST 2 VIEWS: CPT | Performed by: NURSE PRACTITIONER

## 2018-10-23 RX ORDER — BENZONATATE 100 MG/1
100 CAPSULE ORAL 3 TIMES DAILY PRN
Qty: 12 CAPSULE | Refills: 0 | Status: SHIPPED | OUTPATIENT
Start: 2018-10-23 | End: 2018-10-28

## 2018-10-23 RX ORDER — BENZONATATE 100 MG/1
100 CAPSULE ORAL ONCE
Status: COMPLETED | OUTPATIENT
Start: 2018-10-23 | End: 2018-10-23

## 2018-10-23 RX ORDER — PREDNISONE 20 MG/1
40 TABLET ORAL ONCE
Status: COMPLETED | OUTPATIENT
Start: 2018-10-23 | End: 2018-10-23

## 2018-10-23 RX ORDER — ALBUTEROL SULFATE 90 UG/1
2 AEROSOL, METERED RESPIRATORY (INHALATION) EVERY 4 HOURS PRN
Qty: 1 INHALER | Refills: 0 | Status: SHIPPED | OUTPATIENT
Start: 2018-10-23 | End: 2018-11-06

## 2018-10-23 RX ORDER — AMOXICILLIN AND CLAVULANATE POTASSIUM 875; 125 MG/1; MG/1
1 TABLET, FILM COATED ORAL 2 TIMES DAILY
Qty: 20 TABLET | Refills: 0 | Status: SHIPPED | OUTPATIENT
Start: 2018-10-23 | End: 2018-11-02

## 2018-10-23 RX ORDER — IPRATROPIUM BROMIDE AND ALBUTEROL SULFATE 2.5; .5 MG/3ML; MG/3ML
3 SOLUTION RESPIRATORY (INHALATION) ONCE
Status: COMPLETED | OUTPATIENT
Start: 2018-10-23 | End: 2018-10-23

## 2018-10-23 RX ORDER — PREDNISONE 20 MG/1
40 TABLET ORAL DAILY
Qty: 6 TABLET | Refills: 0 | Status: SHIPPED | OUTPATIENT
Start: 2018-10-24 | End: 2018-10-27

## 2018-10-23 NOTE — ED NOTES
Pt states has had productive cough, sob, chest discomfort for approx 3 days. States it feels hard to breath. No distress noted. Denies fevers. States returned from [place] last night.

## 2018-10-23 NOTE — ED PROVIDER NOTES
Patient Seen in: [facility] [place] AND [facility] Emergency Department    History   CC: cough  HPI: Joon Gutierrzecatrachitobrittany 33year old female  who presents to the ER c/o constant dry cough that is causing dyspnea, congestion, left ear pain, generalized fatigue x3-4 days.  Jackie Ford HPI.    Medications :   Amoxicillin-Pot Clavulanate 875-125 MG Oral Tab,  Take 1 tablet by mouth 2 (two) times daily for 10 days. benzonatate 100 MG Oral Cap,  Take 1 capsule (100 mg total) by mouth 3 (three) times daily as needed for cough.    predniSONE midline  Resp - Lung sounds clear bilaterally and wob unlabored, good aeration with equal, even expansion bilaterally   CV - RRR  Skin - no rashes or petechiae noted, pink warm and dry throughout, mmm, cap refill <2seconds  Neuro - A&O x4, steady gait  MSK instruction and agrees with plan of care.       Disposition and Plan     Clinical Impression:  Acute otitis media, unspecified otitis media type  (primary encounter diagnosis)  Upper respiratory tract infection, unspecified type    Disposition:  Discharge

## 2018-10-23 NOTE — ED INITIAL ASSESSMENT (HPI)
Pt to ER with c/o dyspnea and cough for 3 days. Pt denies fevers. +productive cough per patient. No respiratory distress noted. 100% on room air. Pt just returned home from [place] last night.

## 2018-10-29 ENCOUNTER — TELEPHONE (OUTPATIENT)
Dept: INTERNAL MEDICINE CLINIC | Facility: CLINIC | Age: 28
End: 2018-10-29

## 2018-10-29 ENCOUNTER — OFFICE VISIT (OUTPATIENT)
Dept: OTOLARYNGOLOGY | Facility: CLINIC | Age: 28
End: 2018-10-29
Payer: MEDICAID

## 2018-10-29 VITALS
SYSTOLIC BLOOD PRESSURE: 112 MMHG | DIASTOLIC BLOOD PRESSURE: 70 MMHG | TEMPERATURE: 98 F | BODY MASS INDEX: 19.49 KG/M2 | HEIGHT: 63 IN | WEIGHT: 110 LBS

## 2018-10-29 DIAGNOSIS — J32.9 RECURRENT SINUS INFECTIONS: Primary | ICD-10-CM

## 2018-10-29 DIAGNOSIS — J33.0 ANTROCHOANAL POLYP: Primary | ICD-10-CM

## 2018-10-29 PROCEDURE — 99214 OFFICE O/P EST MOD 30 MIN: CPT | Performed by: OTOLARYNGOLOGY

## 2018-10-29 PROCEDURE — 99212 OFFICE O/P EST SF 10 MIN: CPT | Performed by: OTOLARYNGOLOGY

## 2018-10-29 NOTE — PROGRESS NOTES
Linda Slade is a 33year old female.   Patient presents with:  Sinus Problem: congestion and pressure in left sinus for the last five months; pt has been on two rounds of antibiotics/1 round of steriods for sinus infections/upper respiratory infectio Packs/day: 0.25        Years: 10.00        Pack years: 2.5        Types: Cigarettes      Smokeless tobacco: Never Used      Tobacco comment: 5 cigarettes per day    Substance and Sexual Activity      Alcohol use:  Yes        Alcohol/week: 0.6 - 1.2 oz Negative Blurred vision and vision changes. Respiratory Negative Dyspnea and wheezing. Cardio Negative Chest pain, irregular heartbeat/palpitations and syncope. GI Negative Abdominal pain and diarrhea.    Endocrine Negative Cold intolerance and heat i days., Disp: 20 tablet, Rfl: 0  •  Albuterol Sulfate  (90 Base) MCG/ACT Inhalation Aero Soln, Inhale 2 puffs into the lungs every 4 (four) hours as needed for Wheezing or Shortness of Breath (spastic cough). , Disp: 1 Inhaler, Rfl: 0  •  Norethin Ace

## 2018-10-30 ENCOUNTER — TELEPHONE (OUTPATIENT)
Dept: INTERNAL MEDICINE CLINIC | Facility: CLINIC | Age: 28
End: 2018-10-30

## 2018-10-30 ENCOUNTER — TELEPHONE (OUTPATIENT)
Dept: OTOLARYNGOLOGY | Facility: CLINIC | Age: 28
End: 2018-10-30

## 2018-10-30 NOTE — TELEPHONE ENCOUNTER
Prior auth approved for CT sinus , auth # [id number] and expires 12/14/18. Pt informed that prior auth not a guarantee of payment and pt to verify benefits and out of pocket cost as a prior auth not a guarantee of payment.

## 2018-11-07 ENCOUNTER — HOSPITAL ENCOUNTER (OUTPATIENT)
Dept: CT IMAGING | Facility: HOSPITAL | Age: 28
Discharge: HOME | End: 2018-11-07
Attending: OTOLARYNGOLOGY
Payer: MEDICAID

## 2018-11-07 DIAGNOSIS — J33.0 ANTROCHOANAL POLYP: ICD-10-CM

## 2018-11-07 PROCEDURE — 70486 CT MAXILLOFACIAL W/O DYE: CPT | Performed by: OTOLARYNGOLOGY

## 2018-11-07 NOTE — H&P
Signed • Encounter Date: 10/29/2018   Susan Johnson is a 33year old female.   Patient presents with:  Sinus Problem: congestion and pressure in left sinus for the last five months; pt has been on two rounds of antibiotics/1 round of steriods for sinus Smoking status: Current Some Day Smoker        Packs/day: 0.25        Years: 10.00        Pack years: 2.5        Types: Cigarettes      Smokeless tobacco: Never Used      Tobacco comment: 5 cigarettes per day    Substance and Sexual Activity      Alcohol u Constitutional Negative Fatigue, fever and weight loss. ENMT Negative Drooling. Eyes Negative Blurred vision and vision changes. Respiratory Negative Dyspnea and wheezing. Cardio Negative Chest pain, irregular heartbeat/palpitations and syncope. Medications:   •  Amoxicillin-Pot Clavulanate 875-125 MG Oral Tab, Take 1 tablet by mouth 2 (two) times daily for 10 days. , Disp: 20 tablet, Rfl: 0  •  Albuterol Sulfate  (90 Base) MCG/ACT Inhalation Aero Soln, Inhale 2 puffs into the lungs every 4

## 2018-11-09 ENCOUNTER — HOSPITAL ENCOUNTER (OUTPATIENT)
Facility: HOSPITAL | Age: 28
Setting detail: HOSPITAL OUTPATIENT SURGERY
Discharge: HOME | End: 2018-11-09
Attending: OTOLARYNGOLOGY | Admitting: OTOLARYNGOLOGY
Payer: MEDICAID

## 2018-11-09 ENCOUNTER — ANESTHESIA EVENT (OUTPATIENT)
Dept: SURGERY | Facility: HOSPITAL | Age: 28
End: 2018-11-09

## 2018-11-09 ENCOUNTER — ANESTHESIA (OUTPATIENT)
Dept: SURGERY | Facility: HOSPITAL | Age: 28
End: 2018-11-09

## 2018-11-09 ENCOUNTER — TELEPHONE (OUTPATIENT)
Dept: OTOLARYNGOLOGY | Facility: CLINIC | Age: 28
End: 2018-11-09

## 2018-11-09 VITALS
HEIGHT: 63 IN | TEMPERATURE: 99 F | DIASTOLIC BLOOD PRESSURE: 60 MMHG | OXYGEN SATURATION: 99 % | BODY MASS INDEX: 19.47 KG/M2 | SYSTOLIC BLOOD PRESSURE: 97 MMHG | HEART RATE: 99 BPM | RESPIRATION RATE: 16 BRPM | WEIGHT: 109.88 LBS

## 2018-11-09 DIAGNOSIS — J32.0 CHRONIC MAXILLARY SINUSITIS: ICD-10-CM

## 2018-11-09 DIAGNOSIS — J33.9 NOSE POLYP: ICD-10-CM

## 2018-11-09 PROCEDURE — 30930 THER FX NASAL INF TURBINATE: CPT | Performed by: OTOLARYNGOLOGY

## 2018-11-09 PROCEDURE — 31267 ENDOSCOPY MAXILLARY SINUS: CPT | Performed by: OTOLARYNGOLOGY

## 2018-11-09 PROCEDURE — 61782 SCAN PROC CRANIAL EXTRA: CPT | Performed by: OTOLARYNGOLOGY

## 2018-11-09 PROCEDURE — 09DQ4ZZ EXTRACTION OF RIGHT MAXILLARY SINUS, PERCUTANEOUS ENDOSCOPIC APPROACH: ICD-10-PCS | Performed by: OTOLARYNGOLOGY

## 2018-11-09 PROCEDURE — 09DR4ZZ EXTRACTION OF LEFT MAXILLARY SINUS, PERCUTANEOUS ENDOSCOPIC APPROACH: ICD-10-PCS | Performed by: OTOLARYNGOLOGY

## 2018-11-09 RX ORDER — ACETAMINOPHEN 160 MG/5ML
650 SOLUTION ORAL EVERY 4 HOURS PRN
Status: CANCELLED | OUTPATIENT
Start: 2018-11-09

## 2018-11-09 RX ORDER — CEFUROXIME AXETIL 500 MG/1
500 TABLET ORAL EVERY 12 HOURS SCHEDULED
Status: CANCELLED | OUTPATIENT
Start: 2018-11-09

## 2018-11-09 RX ORDER — MORPHINE SULFATE 4 MG/ML
2 INJECTION, SOLUTION INTRAMUSCULAR; INTRAVENOUS EVERY 2 HOUR PRN
Status: CANCELLED | OUTPATIENT
Start: 2018-11-09

## 2018-11-09 RX ORDER — FAMOTIDINE 20 MG/1
20 TABLET ORAL ONCE
Status: DISCONTINUED | OUTPATIENT
Start: 2018-11-09 | End: 2018-11-09 | Stop reason: HOSPADM

## 2018-11-09 RX ORDER — DEXAMETHASONE SODIUM PHOSPHATE 4 MG/ML
VIAL (ML) INJECTION AS NEEDED
Status: DISCONTINUED | OUTPATIENT
Start: 2018-11-09 | End: 2018-11-09 | Stop reason: SURG

## 2018-11-09 RX ORDER — MORPHINE SULFATE 4 MG/ML
4 INJECTION, SOLUTION INTRAMUSCULAR; INTRAVENOUS EVERY 10 MIN PRN
Status: DISCONTINUED | OUTPATIENT
Start: 2018-11-09 | End: 2018-11-09

## 2018-11-09 RX ORDER — AMOXICILLIN 500 MG/1
500 CAPSULE ORAL 3 TIMES DAILY
Qty: 21 CAPSULE | Refills: 0 | Status: SHIPPED | OUTPATIENT
Start: 2018-11-09 | End: 2018-11-16

## 2018-11-09 RX ORDER — MORPHINE SULFATE 4 MG/ML
4 INJECTION, SOLUTION INTRAMUSCULAR; INTRAVENOUS EVERY 2 HOUR PRN
Status: CANCELLED | OUTPATIENT
Start: 2018-11-09

## 2018-11-09 RX ORDER — DIAPER,BRIEF,INFANT-TODD,DISP
EACH MISCELLANEOUS AS NEEDED
Status: DISCONTINUED | OUTPATIENT
Start: 2018-11-09 | End: 2018-11-09 | Stop reason: HOSPADM

## 2018-11-09 RX ORDER — GLYCOPYRROLATE 0.2 MG/ML
INJECTION INTRAMUSCULAR; INTRAVENOUS AS NEEDED
Status: DISCONTINUED | OUTPATIENT
Start: 2018-11-09 | End: 2018-11-09 | Stop reason: SURG

## 2018-11-09 RX ORDER — HYDROCODONE BITARTRATE AND ACETAMINOPHEN 5; 325 MG/1; MG/1
2 TABLET ORAL AS NEEDED
Status: COMPLETED | OUTPATIENT
Start: 2018-11-09 | End: 2018-11-09

## 2018-11-09 RX ORDER — LABETALOL HYDROCHLORIDE 5 MG/ML
INJECTION, SOLUTION INTRAVENOUS AS NEEDED
Status: DISCONTINUED | OUTPATIENT
Start: 2018-11-09 | End: 2018-11-09 | Stop reason: SURG

## 2018-11-09 RX ORDER — NALOXONE HYDROCHLORIDE 0.4 MG/ML
80 INJECTION, SOLUTION INTRAMUSCULAR; INTRAVENOUS; SUBCUTANEOUS AS NEEDED
Status: DISCONTINUED | OUTPATIENT
Start: 2018-11-09 | End: 2018-11-09

## 2018-11-09 RX ORDER — HYDROCODONE BITARTRATE AND ACETAMINOPHEN 5; 325 MG/1; MG/1
1 TABLET ORAL AS NEEDED
Status: COMPLETED | OUTPATIENT
Start: 2018-11-09 | End: 2018-11-09

## 2018-11-09 RX ORDER — ACETAMINOPHEN 325 MG/1
650 TABLET ORAL EVERY 4 HOURS PRN
Status: CANCELLED | OUTPATIENT
Start: 2018-11-09

## 2018-11-09 RX ORDER — ONDANSETRON 2 MG/ML
4 INJECTION INTRAMUSCULAR; INTRAVENOUS ONCE AS NEEDED
Status: DISCONTINUED | OUTPATIENT
Start: 2018-11-09 | End: 2018-11-09

## 2018-11-09 RX ORDER — LIDOCAINE HYDROCHLORIDE AND EPINEPHRINE 10; 10 MG/ML; UG/ML
INJECTION, SOLUTION INFILTRATION; PERINEURAL AS NEEDED
Status: DISCONTINUED | OUTPATIENT
Start: 2018-11-09 | End: 2018-11-09

## 2018-11-09 RX ORDER — ONDANSETRON 2 MG/ML
INJECTION INTRAMUSCULAR; INTRAVENOUS AS NEEDED
Status: DISCONTINUED | OUTPATIENT
Start: 2018-11-09 | End: 2018-11-09 | Stop reason: SURG

## 2018-11-09 RX ORDER — HYDROCODONE BITARTRATE AND ACETAMINOPHEN 7.5; 325 MG/1; MG/1
1 TABLET ORAL EVERY 6 HOURS PRN
Qty: 15 TABLET | Refills: 0 | Status: SHIPPED | OUTPATIENT
Start: 2018-11-09 | End: 2019-10-03

## 2018-11-09 RX ORDER — MORPHINE SULFATE 10 MG/ML
6 INJECTION, SOLUTION INTRAMUSCULAR; INTRAVENOUS EVERY 10 MIN PRN
Status: DISCONTINUED | OUTPATIENT
Start: 2018-11-09 | End: 2018-11-09

## 2018-11-09 RX ORDER — LIDOCAINE HYDROCHLORIDE 10 MG/ML
INJECTION, SOLUTION EPIDURAL; INFILTRATION; INTRACAUDAL; PERINEURAL AS NEEDED
Status: DISCONTINUED | OUTPATIENT
Start: 2018-11-09 | End: 2018-11-09 | Stop reason: SURG

## 2018-11-09 RX ORDER — METOCLOPRAMIDE 10 MG/1
10 TABLET ORAL ONCE
Status: DISCONTINUED | OUTPATIENT
Start: 2018-11-09 | End: 2018-11-09 | Stop reason: HOSPADM

## 2018-11-09 RX ORDER — HYDROCODONE BITARTRATE AND ACETAMINOPHEN 5; 325 MG/1; MG/1
1 TABLET ORAL EVERY 4 HOURS PRN
Status: CANCELLED | OUTPATIENT
Start: 2018-11-09

## 2018-11-09 RX ORDER — SODIUM CHLORIDE 0.9 % (FLUSH) 0.9 %
10 SYRINGE (ML) INJECTION AS NEEDED
Status: CANCELLED | OUTPATIENT
Start: 2018-11-09

## 2018-11-09 RX ORDER — MORPHINE SULFATE 4 MG/ML
2 INJECTION, SOLUTION INTRAMUSCULAR; INTRAVENOUS EVERY 10 MIN PRN
Status: DISCONTINUED | OUTPATIENT
Start: 2018-11-09 | End: 2018-11-09

## 2018-11-09 RX ORDER — SODIUM CHLORIDE, SODIUM LACTATE, POTASSIUM CHLORIDE, CALCIUM CHLORIDE 600; 310; 30; 20 MG/100ML; MG/100ML; MG/100ML; MG/100ML
INJECTION, SOLUTION INTRAVENOUS CONTINUOUS
Status: DISCONTINUED | OUTPATIENT
Start: 2018-11-09 | End: 2018-11-09

## 2018-11-09 RX ORDER — ONDANSETRON 2 MG/ML
4 INJECTION INTRAMUSCULAR; INTRAVENOUS EVERY 6 HOURS PRN
Status: CANCELLED | OUTPATIENT
Start: 2018-11-09

## 2018-11-09 RX ORDER — LIDOCAINE HYDROCHLORIDE 40 MG/ML
SOLUTION TOPICAL AS NEEDED
Status: DISCONTINUED | OUTPATIENT
Start: 2018-11-09 | End: 2018-11-09 | Stop reason: SURG

## 2018-11-09 RX ORDER — ACETAMINOPHEN 500 MG
1000 TABLET ORAL ONCE
Status: COMPLETED | OUTPATIENT
Start: 2018-11-09 | End: 2018-11-09

## 2018-11-09 RX ORDER — ONDANSETRON 4 MG/1
4 TABLET, ORALLY DISINTEGRATING ORAL EVERY 6 HOURS PRN
Status: CANCELLED | OUTPATIENT
Start: 2018-11-09

## 2018-11-09 RX ADMIN — LIDOCAINE HYDROCHLORIDE 50 MG: 10 INJECTION, SOLUTION EPIDURAL; INFILTRATION; INTRACAUDAL; PERINEURAL at 10:20:00

## 2018-11-09 RX ADMIN — LABETALOL HYDROCHLORIDE 10 MG: 5 INJECTION, SOLUTION INTRAVENOUS at 10:46:00

## 2018-11-09 RX ADMIN — LABETALOL HYDROCHLORIDE 10 MG: 5 INJECTION, SOLUTION INTRAVENOUS at 10:55:00

## 2018-11-09 RX ADMIN — DEXAMETHASONE SODIUM PHOSPHATE 4 MG: 4 MG/ML VIAL (ML) INJECTION at 10:20:00

## 2018-11-09 RX ADMIN — ONDANSETRON 4 MG: 2 INJECTION INTRAMUSCULAR; INTRAVENOUS at 10:20:00

## 2018-11-09 RX ADMIN — LIDOCAINE HYDROCHLORIDE 4 ML: 40 SOLUTION TOPICAL at 10:20:00

## 2018-11-09 RX ADMIN — SODIUM CHLORIDE, SODIUM LACTATE, POTASSIUM CHLORIDE, CALCIUM CHLORIDE: 600; 310; 30; 20 INJECTION, SOLUTION INTRAVENOUS at 10:55:00

## 2018-11-09 RX ADMIN — SODIUM CHLORIDE, SODIUM LACTATE, POTASSIUM CHLORIDE, CALCIUM CHLORIDE: 600; 310; 30; 20 INJECTION, SOLUTION INTRAVENOUS at 10:17:00

## 2018-11-09 RX ADMIN — GLYCOPYRROLATE 0.2 MG: 0.2 INJECTION INTRAMUSCULAR; INTRAVENOUS at 10:42:00

## 2018-11-09 NOTE — TELEPHONE ENCOUNTER
Pt mother states will call back to schedule post op . Advised pt mother we will call pt tomorrow after first post op.

## 2018-11-09 NOTE — INTERVAL H&P NOTE
Pre-op Diagnosis: Nose polyp [J33.9]  Chronic maxillary sinusitis [J32.0]    The above referenced H&P was reviewed by Nadya Mallory MD on 11/9/2018, the patient was examined and no significant changes have occurred in the patient's condition since the H&P

## 2018-11-09 NOTE — OPERATIVE REPORT
Aftab Parham  DATE OF SURGERY: 11/9/2018  PREOPERATIVE DIAGNOSIS:   Maxillary sinusitis, left antrochoanal polyp - recurrent, infrior turbinate hypertrophy biateral   POSTOPERATIVE DIAGNOSIS: Same.   OPERATIVE PROCEDURE:   Medtronic assisted navigati system was used to facilitate this. A Propel steroid impregnated mini implant  were placed in the left maxillary os. Small Telfa pledgets were placed temporarily. The gastric contents were evacuated with an orogastric tube.  The patient was then unevent

## 2018-11-09 NOTE — ANESTHESIA PROCEDURE NOTES
ANESTHESIA INTUBATION  Date/Time: 11/9/2018 10:25 AM  Urgency: elective    Airway not difficult    General Information and Staff    Patient location during procedure: OR  Anesthesiologist: Samy Riojas MD  Resident/CRNA: PRASANTH Arenas

## 2018-11-09 NOTE — ANESTHESIA POSTPROCEDURE EVALUATION
Patient: Meagan Myles    Procedure Summary     Date:  11/09/18 Room / Location:  [facility] OR  / [facility] OR    Anesthesia Start:  8540 Anesthesia Stop:  1227    Procedure:  NASAL SEPTOPLASTY BILAT SINUS ENDOSCOPY ETHM MAX (Bilateral ) Diagnosis:

## 2018-11-09 NOTE — ANESTHESIA PREPROCEDURE EVALUATION
Anesthesia PreOp Note    HPI:     Melvin Manzano is a 33year old female who presents for preoperative consultation requested by: Dominick Barba MD    Date of Surgery: 11/9/2018    Procedure(s):  NASAL SEPTOPLASTY BILAT SINUS ENDOSCOPY ETHM MAX  [facility] Medications Prior to Admission:  Norethin Ace-Eth Estrad-FE (LOESTRIN FE 1/20) 1-20 MG-MCG Oral Tab Take 1 tablet by mouth daily.  Disp: 1 Package Rfl: 12 11/9/2018 at 0730   ValACYclovir HCl (VALTREX) 500 MG Oral Tab Take 1 tablet (500 mg total) by Cigarettes      Smokeless tobacco: Never Used      Tobacco comment: quit 1 month ago    Substance and Sexual Activity      Alcohol use:  Yes        Alcohol/week: 0.6 - 1.2 oz        Types: 1 - 2 Standard drinks or equivalent per week        Comment: 2-3 dri Height: 1.6 m (5' 3\") 1.6 m (5' 3\")        Anesthesia ROS/Med Hx and Physical Exam     Patient summary reviewed and Nursing notes reviewed    No history of anesthetic complications   Airway   Mallampati: II  Neck ROM: full  Dental - normal exam     Pul

## 2018-11-10 ENCOUNTER — TELEPHONE (OUTPATIENT)
Dept: OTOLARYNGOLOGY | Facility: CLINIC | Age: 28
End: 2018-11-10

## 2018-11-10 NOTE — TELEPHONE ENCOUNTER
Pt is post op day 1 endo maxillary with tissue removal,  endo polypectomy . Per  Pt pt is doing well no bleeding, advised pt no bending or heavy lifting for the next week and pt is not to blow nose until seen by JK.  Advised pt she can start using OTC salin

## 2018-11-15 ENCOUNTER — OFFICE VISIT (OUTPATIENT)
Dept: OTOLARYNGOLOGY | Facility: CLINIC | Age: 28
End: 2018-11-15
Payer: MEDICAID

## 2018-11-15 VITALS
WEIGHT: 109 LBS | DIASTOLIC BLOOD PRESSURE: 73 MMHG | BODY MASS INDEX: 19.31 KG/M2 | TEMPERATURE: 98 F | HEIGHT: 63 IN | SYSTOLIC BLOOD PRESSURE: 112 MMHG

## 2018-11-15 DIAGNOSIS — J33.0 ANTROCHOANAL POLYP: Primary | ICD-10-CM

## 2018-11-15 PROCEDURE — 99024 POSTOP FOLLOW-UP VISIT: CPT | Performed by: OTOLARYNGOLOGY

## 2018-11-15 PROCEDURE — 99212 OFFICE O/P EST SF 10 MIN: CPT | Performed by: OTOLARYNGOLOGY

## 2018-11-15 NOTE — PROGRESS NOTES
Beatriz Moreno is a 33year old female.   Patient presents with:  Post-Op: endo maxillary with tissue removal and endo nasal polypectomy done on 11-9-18, c/o soreness of left cheek       HISTORY OF PRESENT ILLNESS  ERIN NOTES  She presents with a five-mo Pack years: 2.5        Types: Cigarettes      Smokeless tobacco: Never Used      Tobacco comment: quit 1 month ago    Substance and Sexual Activity      Alcohol use:  Yes        Alcohol/week: 0.6 - 1.2 oz        Types: 1 - 2 Standard drinks or equivalent pe OF NASAL SEPTUM     • SINUS SURGERY        septoplasty, TBR         REVIEW OF SYSTEMS    System Neg/Pos Details   Constitutional Negative Fatigue, fever and weight loss. ENMT Negative Drooling. Eyes Negative Blurred vision and vision changes.    [facility] Outpatient Medications:   •  amoxicillin 500 MG Oral Cap, Take 1 capsule (500 mg total) by mouth 3 (three) times daily for 7 days. , Disp: 21 capsule, Rfl: 0  •  Norethin Ace-Eth Estrad-FE (LOESTRIN FE 1/20) 1-20 MG-MCG Oral Tab, Take 1 tablet by mouth jomar

## 2018-11-24 ENCOUNTER — HOSPITAL ENCOUNTER (OUTPATIENT)
Age: 28
Discharge: HOME | End: 2018-11-24
Payer: MEDICAID

## 2018-11-24 VITALS
HEART RATE: 86 BPM | RESPIRATION RATE: 18 BRPM | TEMPERATURE: 99 F | BODY MASS INDEX: 19.31 KG/M2 | DIASTOLIC BLOOD PRESSURE: 79 MMHG | OXYGEN SATURATION: 99 % | WEIGHT: 109 LBS | SYSTOLIC BLOOD PRESSURE: 117 MMHG | HEIGHT: 63 IN

## 2018-11-24 DIAGNOSIS — J02.0 STREPTOCOCCAL SORE THROAT: Primary | ICD-10-CM

## 2018-11-24 PROCEDURE — 99214 OFFICE O/P EST MOD 30 MIN: CPT

## 2018-11-24 PROCEDURE — 87430 STREP A AG IA: CPT

## 2018-11-24 PROCEDURE — 99213 OFFICE O/P EST LOW 20 MIN: CPT

## 2018-11-24 RX ORDER — CEPHALEXIN 500 MG/1
500 CAPSULE ORAL 4 TIMES DAILY
Qty: 40 CAPSULE | Refills: 0 | Status: SHIPPED | OUTPATIENT
Start: 2018-11-24 | End: 2018-12-04

## 2018-11-24 NOTE — ED INITIAL ASSESSMENT (HPI)
PATIENT REPORTS SINUS SURGERY 2 WEEKS AGO. NOW C/O LEFT SIDED SINUS CONGESTION AND A SORE THROAT. SURGERY DONE BY DR. Isabelle Shearer. DENIES FEVERS.

## 2018-11-24 NOTE — ED PROVIDER NOTES
Patient presents with:  Cough/URI      HPI:     Meagan Myles is a 33year old female who presents for evaluation of a chief complaint of sore throat, chills, and body aches for the past couple days.   The patient states she had sinus surgery done 2 we use: No      Sexual activity: Yes        Partners: Male        Birth control/protection: Condom, OCP        Comment: same partner x 2 years    Other Topics      Concerns:         Service: Not Asked        Blood Transfusions: No        Caffeine Conc Value Ref Range    POCT Rapid Strep Positive (A) Negative       MDM:  Will recommend warm compresses to the left cheek. I will prescribe her Keflex for the strep throat since she was on a recent course of amoxicillin.   She will follow-up closely with her

## 2018-12-06 ENCOUNTER — OFFICE VISIT (OUTPATIENT)
Dept: OTOLARYNGOLOGY | Facility: CLINIC | Age: 28
End: 2018-12-06
Payer: MEDICAID

## 2018-12-06 VITALS
SYSTOLIC BLOOD PRESSURE: 107 MMHG | HEIGHT: 63 IN | DIASTOLIC BLOOD PRESSURE: 66 MMHG | WEIGHT: 110 LBS | TEMPERATURE: 98 F | BODY MASS INDEX: 19.49 KG/M2

## 2018-12-06 DIAGNOSIS — J33.0 ANTROCHOANAL POLYP: Primary | ICD-10-CM

## 2018-12-06 PROCEDURE — 31237 NSL/SINS NDSC SURG BX POLYPC: CPT | Performed by: OTOLARYNGOLOGY

## 2018-12-06 PROCEDURE — 99212 OFFICE O/P EST SF 10 MIN: CPT | Performed by: OTOLARYNGOLOGY

## 2018-12-06 PROCEDURE — 99024 POSTOP FOLLOW-UP VISIT: CPT | Performed by: OTOLARYNGOLOGY

## 2018-12-06 NOTE — PROGRESS NOTES
Beatriz oMreno is a 33year old female.   Patient presents with:  Post-Op: 3 week follow up after recurrent antrochonanal polyp, c/o yellow and green discharge from nose, post nasal drip, pt was diagnosed with strep throat about 10 days ago, finished a children: Not on file      Years of education: Not on file      Highest education level: Not on file    Tobacco Use      Smoking status: Former Smoker        Packs/day: 0.25        Years: 10.00        Pack years: 2.5        Types: Cigarettes      Smokeless SEPTUM     • SINUS SURGERY        septoplasty, TBR         REVIEW OF SYSTEMS    System Neg/Pos Details   Constitutional Negative Fatigue, fever and weight loss. ENMT Negative Drooling. Eyes Negative Blurred vision and vision changes.    Respiratory Nega after appropriate informed consent including explanation of the procedure the patient agreed to proceed all questions were answered. Topical lidocaine/neosynephrine was sprayed in both nostrils.   After adequate anesthesia and vasoconstrictive effects, t issue with deciding when to do tonsillectomies is not that it is a complex dangerous procedure but rather that the that the recovery is so painful.   She will consider and possibly schedule this in the future    Curtis Erickson MD  12/6/2018

## 2018-12-11 ENCOUNTER — HOSPITAL ENCOUNTER (OUTPATIENT)
Age: 28
Discharge: HOME | End: 2018-12-11
Attending: FAMILY MEDICINE
Payer: MEDICAID

## 2018-12-11 VITALS
BODY MASS INDEX: 19.49 KG/M2 | WEIGHT: 110 LBS | HEIGHT: 63 IN | OXYGEN SATURATION: 100 % | SYSTOLIC BLOOD PRESSURE: 119 MMHG | HEART RATE: 97 BPM | TEMPERATURE: 98 F | DIASTOLIC BLOOD PRESSURE: 78 MMHG | RESPIRATION RATE: 18 BRPM

## 2018-12-11 DIAGNOSIS — N30.00 ACUTE CYSTITIS WITHOUT HEMATURIA: Primary | ICD-10-CM

## 2018-12-11 PROCEDURE — 81025 URINE PREGNANCY TEST: CPT

## 2018-12-11 PROCEDURE — 99213 OFFICE O/P EST LOW 20 MIN: CPT

## 2018-12-11 PROCEDURE — 99214 OFFICE O/P EST MOD 30 MIN: CPT

## 2018-12-11 PROCEDURE — 81003 URINALYSIS AUTO W/O SCOPE: CPT

## 2018-12-11 RX ORDER — NITROFURANTOIN 25; 75 MG/1; MG/1
100 CAPSULE ORAL 2 TIMES DAILY
Qty: 14 CAPSULE | Refills: 0 | Status: SHIPPED | OUTPATIENT
Start: 2018-12-11 | End: 2018-12-18

## 2018-12-11 NOTE — ED INITIAL ASSESSMENT (HPI)
PATIENT ARRIVED AMBULATORY TO ROOM C/O SYMPTOMS OF A UTI THAT STARTED YESTERDAY. PATIENT STATES \"I HAD SOME LEFT OVER AMOXICILLIN AT HOME. I TOOK THAT AND A PYRIDIUM THAT I HAD AT HOME YESTERDAY\" +ABDOMINAL PRESSURE. +DYSURIA. NO HEMATURIA. NO N/V/D.  NO

## 2018-12-11 NOTE — ED PROVIDER NOTES
Patient presents with:  Urinary Symptoms (urologic)    HPI:     Melvin Manzano is a 33year old female who presents with a chief complaint of dysuria, frequency, urgency of urination  Onset of symptoms: 2 days ago  Symptoms reported to be  gradually wo 15  (A) Negative mg/dL    Bilirubin, Urine Negative Negative    Blood, Urine Large (A) Negative    Nitrite Urine Positive (A) Negative    Urobilinogen urine <2.0 <2.0 mg/dL    Leukocyte esterase urine Large (A) Negative   [facility] POCT PREGNANCY URINE    Collec

## 2018-12-18 ENCOUNTER — HOSPITAL ENCOUNTER (EMERGENCY)
Facility: HOSPITAL | Age: 28
Discharge: HOME | End: 2018-12-18
Payer: MEDICAID

## 2018-12-18 ENCOUNTER — TELEPHONE (OUTPATIENT)
Dept: OTOLARYNGOLOGY | Facility: CLINIC | Age: 28
End: 2018-12-18

## 2018-12-18 VITALS
DIASTOLIC BLOOD PRESSURE: 79 MMHG | RESPIRATION RATE: 18 BRPM | HEART RATE: 79 BPM | WEIGHT: 110 LBS | BODY MASS INDEX: 19.49 KG/M2 | OXYGEN SATURATION: 100 % | TEMPERATURE: 98 F | HEIGHT: 63 IN | SYSTOLIC BLOOD PRESSURE: 122 MMHG

## 2018-12-18 DIAGNOSIS — J02.9 VIRAL PHARYNGITIS: Primary | ICD-10-CM

## 2018-12-18 PROCEDURE — 99283 EMERGENCY DEPT VISIT LOW MDM: CPT

## 2018-12-18 PROCEDURE — 87430 STREP A AG IA: CPT

## 2018-12-18 RX ORDER — PREDNISONE 20 MG/1
20 TABLET ORAL DAILY
Qty: 5 TABLET | Refills: 0 | Status: SHIPPED | OUTPATIENT
Start: 2018-12-18 | End: 2018-12-23

## 2018-12-18 NOTE — TELEPHONE ENCOUNTER
Pt would like to schedule tonsillectomy, asking if it can be scheduled or if KELBYK needs to see her. Pls call thank you.

## 2018-12-19 NOTE — ED INITIAL ASSESSMENT (HPI)
Pt comes to ER with c/o sore throat for one week; right tonsil and neck pain, hoarseness and fatigue.

## 2018-12-19 NOTE — ED PROVIDER NOTES
Patient Seen in: [facility] AND [facility] Emergency Department    History   Patient presents with:  Sore Throat    Stated Complaint: SORE THROAT    HPI    56-year-old female presents to the emergency department complaining of a sore throat for 1 week.   She comp Former Smoker        Packs/day: 0.25        Years: 10.00        Pack years: 2.5        Types: Cigarettes      Smokeless tobacco: Never Used      Tobacco comment: quit 1 month ago    Alcohol use:  Yes      Alcohol/week: 0.6 - 1.2 oz      Types: 1 - 2 Standar explained to the patient that emergent conditions may arise and to return to the ER for new, worsening or any persistent conditions. I've explained the importance of following up with their doctor as instructed.  The patient verbalized understanding of the

## 2019-01-19 ENCOUNTER — HOSPITAL ENCOUNTER (OUTPATIENT)
Age: 29
Discharge: HOME | End: 2019-01-19
Payer: MEDICAID

## 2019-01-19 VITALS
HEART RATE: 81 BPM | RESPIRATION RATE: 18 BRPM | TEMPERATURE: 98 F | OXYGEN SATURATION: 100 % | SYSTOLIC BLOOD PRESSURE: 108 MMHG | DIASTOLIC BLOOD PRESSURE: 76 MMHG

## 2019-01-19 DIAGNOSIS — N30.90 CYSTITIS: Primary | ICD-10-CM

## 2019-01-19 LAB
B-HCG UR QL: NEGATIVE
COLOR UR: YELLOW
GLUCOSE UR STRIP-MCNC: NEGATIVE MG/DL
KETONES UR STRIP-MCNC: NEGATIVE MG/DL
NITRITE UR QL STRIP: NEGATIVE
PH UR STRIP: 5.5 [PH]
PROT UR STRIP-MCNC: 30 MG/DL
SP GR UR STRIP: >=1.03
UROBILINOGEN UR STRIP-ACNC: <2 MG/DL

## 2019-01-19 PROCEDURE — 99214 OFFICE O/P EST MOD 30 MIN: CPT

## 2019-01-19 PROCEDURE — 87086 URINE CULTURE/COLONY COUNT: CPT | Performed by: NURSE PRACTITIONER

## 2019-01-19 PROCEDURE — 81003 URINALYSIS AUTO W/O SCOPE: CPT

## 2019-01-19 PROCEDURE — 81025 URINE PREGNANCY TEST: CPT

## 2019-01-19 RX ORDER — PHENAZOPYRIDINE HYDROCHLORIDE 100 MG/1
100 TABLET, FILM COATED ORAL 3 TIMES DAILY PRN
Qty: 6 TABLET | Refills: 0 | Status: SHIPPED | OUTPATIENT
Start: 2019-01-19 | End: 2019-01-26

## 2019-01-19 RX ORDER — NITROFURANTOIN 25; 75 MG/1; MG/1
100 CAPSULE ORAL 2 TIMES DAILY
Qty: 10 CAPSULE | Refills: 0 | Status: SHIPPED | OUTPATIENT
Start: 2019-01-19 | End: 2019-01-24

## 2019-01-19 NOTE — ED INITIAL ASSESSMENT (HPI)
C/O \"PRESSURE\" WITH URINATING UPON WAKING UP THIS AM.  DENIES LOWER BACK PAIN AND FLANK PAIN. DENIES FEVERS. DENIES BLOOD IN URINE.

## 2019-01-19 NOTE — ED PROVIDER NOTES
Patient presents with:  Urinary Symptoms (urologic)      HPI:     Arun Santos is a 33year old female with, anxiety and depression presents with urinary urgency, dysuria, frequency as well as pelvic pressure.   Patient reports symptoms started this m verbalized plan of care and states understanding.     Orders Placed This Encounter      POCT Urinalysis Dipstick Once      POCT Pregnancy, Urine Once      Urine Culture, Routine Once      POCT Pregnancy, Urine      POCT Urine Dip      Nitrofurantoin Monohyd

## 2019-02-01 ENCOUNTER — TELEPHONE (OUTPATIENT)
Dept: OBGYN CLINIC | Facility: CLINIC | Age: 29
End: 2019-02-01

## 2019-02-01 NOTE — TELEPHONE ENCOUNTER
Pt reports irregular bleeding for a couple months and asking if she can just stop taking OCP's. States she has been on this OCP for about 2 years and would like to her body a break from OCP's. Pt denies abdominal pain and cramping.  Advised pt nurse can off

## 2019-02-18 ENCOUNTER — TELEPHONE (OUTPATIENT)
Dept: OBGYN CLINIC | Facility: CLINIC | Age: 29
End: 2019-02-18

## 2019-02-18 NOTE — TELEPHONE ENCOUNTER
LAST ANNUAL 4-16-18. SENT BACK RX DENIED, REQUESTED TOO SOON AND TO SEE RX SENT TO THEIR STORE ON 4-16-18 FOR 1 PACK WITH 12 REFILLS.

## 2019-02-25 ENCOUNTER — HOSPITAL ENCOUNTER (OUTPATIENT)
Age: 29
Discharge: HOME | End: 2019-02-25
Payer: MEDICAID

## 2019-02-25 VITALS
TEMPERATURE: 98 F | DIASTOLIC BLOOD PRESSURE: 63 MMHG | WEIGHT: 110 LBS | HEART RATE: 84 BPM | SYSTOLIC BLOOD PRESSURE: 121 MMHG | OXYGEN SATURATION: 100 % | HEIGHT: 63 IN | BODY MASS INDEX: 19.49 KG/M2 | RESPIRATION RATE: 17 BRPM

## 2019-02-25 DIAGNOSIS — J03.90 ACUTE TONSILLITIS, UNSPECIFIED ETIOLOGY: Primary | ICD-10-CM

## 2019-02-25 LAB
POCT INFLUENZA A: NEGATIVE
POCT INFLUENZA B: NEGATIVE
S PYO AG THROAT QL: NEGATIVE

## 2019-02-25 PROCEDURE — 87502 INFLUENZA DNA AMP PROBE: CPT | Performed by: NURSE PRACTITIONER

## 2019-02-25 PROCEDURE — 87081 CULTURE SCREEN ONLY: CPT | Performed by: NURSE PRACTITIONER

## 2019-02-25 PROCEDURE — 87430 STREP A AG IA: CPT

## 2019-02-25 PROCEDURE — 99214 OFFICE O/P EST MOD 30 MIN: CPT

## 2019-02-25 RX ORDER — AMOXICILLIN 500 MG/1
500 TABLET, FILM COATED ORAL 2 TIMES DAILY
Qty: 20 TABLET | Refills: 0 | Status: SHIPPED | OUTPATIENT
Start: 2019-02-25 | End: 2019-03-07

## 2019-02-25 NOTE — ED PROVIDER NOTES
Patient presents with:  Sore Throat      HPI:     Susan Johnson is a 33year old female presents with a chief complaint of  sore throat, headache and body aches which started last night.  Pt reports no fevers but states she typically does not get fever amoxicillin which she has tolerated in the past. Supportive care as well. Close f/u with PCP is recommended. Pt verbalized plan of care and states understanding.      Orders Placed This Encounter      POCT Rapid Strep      Labs performed this visit:  No res

## 2019-04-22 RX ORDER — NORETHINDRONE ACETATE AND ETHINYL ESTRADIOL AND FERROUS FUMARATE 1MG-20(21)
KIT ORAL
Qty: 28 TABLET | Refills: 0 | Status: SHIPPED | OUTPATIENT
Start: 2019-04-22 | End: 2019-10-03

## 2019-04-22 NOTE — TELEPHONE ENCOUNTER
LAST ANNUAL 4-16-18 (PAP NORMAL) AND NEXT ANNUAL SCHEDULED FOR 5-7-19. AUTHORIZATION FOR 1 PACK SENT TO THE PHARMACY PER PROTOCOL.

## 2019-05-07 ENCOUNTER — OFFICE VISIT (OUTPATIENT)
Dept: OBGYN CLINIC | Facility: CLINIC | Age: 29
End: 2019-05-07
Payer: MEDICAID

## 2019-05-07 VITALS
BODY MASS INDEX: 20 KG/M2 | DIASTOLIC BLOOD PRESSURE: 69 MMHG | HEART RATE: 77 BPM | SYSTOLIC BLOOD PRESSURE: 108 MMHG | WEIGHT: 114 LBS

## 2019-05-07 DIAGNOSIS — D06.1 CARCINOMA IN SITU OF EXOCERVIX: ICD-10-CM

## 2019-05-07 DIAGNOSIS — Z01.419 ENCOUNTER FOR GYNECOLOGICAL EXAMINATION WITHOUT ABNORMAL FINDING: Primary | ICD-10-CM

## 2019-05-07 DIAGNOSIS — Z11.3 SCREENING EXAMINATION FOR STD (SEXUALLY TRANSMITTED DISEASE): ICD-10-CM

## 2019-05-07 PROCEDURE — 99395 PREV VISIT EST AGE 18-39: CPT | Performed by: OBSTETRICS & GYNECOLOGY

## 2019-05-07 RX ORDER — VALACYCLOVIR HYDROCHLORIDE 500 MG/1
500 TABLET, FILM COATED ORAL 2 TIMES DAILY
Qty: 30 TABLET | Refills: 0 | Status: SHIPPED | OUTPATIENT
Start: 2019-05-07 | End: 2019-07-23

## 2019-05-07 NOTE — PROGRESS NOTES
Prachi Hernandez is a 33year old female [id number] Patient's last menstrual period was 04/21/2019 (approximate). 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  Stopped daily suppr 3/20/2017    Performed by Sagar Bernal MD at [address] [facility] [address]   • REPAIR OF NASAL SEPTUM     • SINUS SURGERY        septoplasty, TBR     OB History     T1    L1    SAB0  TAB0  Ectopic0  Multiple0  Live Births1      SOCIAL HISTORY:  Social History Concerns:         Service: Not Asked        Blood Transfusions: No        Caffeine Concern: No        Occupational Exposure: Not Asked        Hobby Hazards: Not Asked        Sleep Concern: No        Stress Concern: Yes        Weight Concern:  No bleeding. PHYSICAL EXAM:   Blood pressure 108/69, pulse 77, weight 114 lb (51.7 kg), last menstrual period 04/21/2019, not currently breastfeeding.   Constitutional:  well developed, well nourished  Head/Face:  normocephalic  Neck/Thyroid: thyroid symm days as needed for outbreaks      Pap w/ HPV done. Plan for cotesting 4/19, 4/20, 4/23. Annual visits. Gc.Chl/Trich done. Valtrex bid x 3d prn outbreak. Reviewed ways to decrease outbreaks.

## 2019-05-26 ENCOUNTER — TELEPHONE (OUTPATIENT)
Dept: OBGYN CLINIC | Facility: CLINIC | Age: 29
End: 2019-05-26

## 2019-05-26 RX ORDER — SULFAMETHOXAZOLE AND TRIMETHOPRIM 800; 160 MG/1; MG/1
1 TABLET ORAL 2 TIMES DAILY
Qty: 6 TABLET | Refills: 0 | Status: SHIPPED | OUTPATIENT
Start: 2019-05-26 | End: 2019-05-29

## 2019-05-27 NOTE — TELEPHONE ENCOUNTER
Pt called with urgency and frequency and suprapubic pressure similar to previous UTI's, erx for macrobid sent

## 2019-06-02 ENCOUNTER — MOBILE ENCOUNTER (OUTPATIENT)
Dept: OBGYN CLINIC | Facility: CLINIC | Age: 29
End: 2019-06-02

## 2019-06-02 DIAGNOSIS — R30.0 DYSURIA: Primary | ICD-10-CM

## 2019-06-02 NOTE — PROGRESS NOTES
Just finished Bactrim w/o lab test & still feels Sx. Going out of town x 9 days starting tomorrow. PT to go to call for urine culture & call in 1-2 days for results for Rx. Going to [place].

## 2019-06-14 ENCOUNTER — TELEPHONE (OUTPATIENT)
Dept: OBGYN CLINIC | Facility: CLINIC | Age: 29
End: 2019-06-14

## 2019-06-14 DIAGNOSIS — Z11.3 SCREENING FOR STDS (SEXUALLY TRANSMITTED DISEASES): Primary | ICD-10-CM

## 2019-06-14 NOTE — TELEPHONE ENCOUNTER
Pt calling to report she wants STD screening done. Explained to pt that if she wants full STD screening, including trich, she will need to come in to office since trich can only be done through vaginal swab.  Pt stated she does not need to have trich done a

## 2019-06-17 ENCOUNTER — LAB ENCOUNTER (OUTPATIENT)
Dept: LAB | Age: 29
End: 2019-06-17
Attending: OBSTETRICS & GYNECOLOGY
Payer: MEDICAID

## 2019-06-17 DIAGNOSIS — R30.0 DYSURIA: ICD-10-CM

## 2019-06-17 DIAGNOSIS — Z11.3 SCREENING FOR STDS (SEXUALLY TRANSMITTED DISEASES): ICD-10-CM

## 2019-06-17 PROCEDURE — 87389 HIV-1 AG W/HIV-1&-2 AB AG IA: CPT

## 2019-06-17 PROCEDURE — 87591 N.GONORRHOEAE DNA AMP PROB: CPT

## 2019-06-17 PROCEDURE — 87491 CHLMYD TRACH DNA AMP PROBE: CPT

## 2019-06-17 PROCEDURE — 86780 TREPONEMA PALLIDUM: CPT

## 2019-06-17 PROCEDURE — 36415 COLL VENOUS BLD VENIPUNCTURE: CPT

## 2019-06-17 PROCEDURE — 87340 HEPATITIS B SURFACE AG IA: CPT

## 2019-06-17 PROCEDURE — 86803 HEPATITIS C AB TEST: CPT

## 2019-06-17 PROCEDURE — 87086 URINE CULTURE/COLONY COUNT: CPT

## 2019-06-28 ENCOUNTER — TELEPHONE (OUTPATIENT)
Dept: OBGYN CLINIC | Facility: CLINIC | Age: 29
End: 2019-06-28

## 2019-06-28 NOTE — TELEPHONE ENCOUNTER
----- Message from Briseida Paz MD sent at 6/24/2019 12:05 PM CDT -----  Please call pt and inform her of results attached

## 2019-07-25 RX ORDER — VALACYCLOVIR HYDROCHLORIDE 500 MG/1
500 TABLET, FILM COATED ORAL DAILY
Qty: 90 TABLET | Refills: 0 | Status: SHIPPED | OUTPATIENT
Start: 2019-07-25 | End: 2020-12-10

## 2019-07-25 NOTE — TELEPHONE ENCOUNTER
STATES AT APPT SHE DISCUSSED MONTHLY OUTBREAKS SO LUNA TOLD HER TO TAKE VALTREX DAILY FOR A FEW MONTHS. SHE IS OUT OF MEDS AND NEEDS REFILL. PLEASE CONFIRM HOW PT SHOULD BE TAKING AND IF OK FOR REFILLS.

## 2019-07-25 NOTE — TELEPHONE ENCOUNTER
Refill request for valtrex, #30, take 1 tab twice a day for 3 days with outbreaks. Pt was given an rx for #30 on 5/7/19 at her annual.  Marcela Nj to see if pt needs refills.

## 2019-08-09 ENCOUNTER — TELEPHONE (OUTPATIENT)
Dept: OBGYN CLINIC | Facility: CLINIC | Age: 29
End: 2019-08-09

## 2019-08-09 DIAGNOSIS — Z32.00 PREGNANCY EXAMINATION OR TEST, PREGNANCY UNCONFIRMED: Primary | ICD-10-CM

## 2019-08-09 NOTE — TELEPHONE ENCOUNTER
PT JUST WENT OFF OC'S IN June. LMP 7-1 THEN SPOTTED FOR 2 DAYS MID CYCLE AND THEN HAD SOME DARK BROWN DISCHARGE/SPOTTING FOR 3 DAYS ON 8-1. FIRST HPT WAS + THEN 4 TESTS AFTER WERE NEGATIVE. PT FEELS DISTENDED AND BLOATED. ASKED PT TO COME FOR BLOOD TEST TODAY AND CALL IN THE MORNING FOR THE RESULT. IF NEGATIVE, PT TO MONITOR AND CALL US BACK IF SHE DOES NOT HAVE A FULL BLEED FROM THE FIRST DAY OF LMP ON 7-1-19. ORDER PLACED.

## 2019-08-09 NOTE — TELEPHONE ENCOUNTER
Pt states she had one positive test and four negative test. LMP 7/1 and had spotting between period. Requesting to speak with nurse.

## 2019-08-13 ENCOUNTER — APPOINTMENT (OUTPATIENT)
Dept: LAB | Age: 29
End: 2019-08-13
Attending: OBSTETRICS & GYNECOLOGY
Payer: MEDICAID

## 2019-08-13 DIAGNOSIS — Z32.00 PREGNANCY EXAMINATION OR TEST, PREGNANCY UNCONFIRMED: ICD-10-CM

## 2019-08-13 LAB — HCG SERPL QL: NEGATIVE

## 2019-08-13 PROCEDURE — 84703 CHORIONIC GONADOTROPIN ASSAY: CPT

## 2019-08-13 PROCEDURE — 36415 COLL VENOUS BLD VENIPUNCTURE: CPT

## 2019-08-14 ENCOUNTER — TELEPHONE (OUTPATIENT)
Dept: OBGYN CLINIC | Facility: CLINIC | Age: 29
End: 2019-08-14

## 2019-08-14 NOTE — TELEPHONE ENCOUNTER
Left detailed voicemail with pt HCG result which was negative. Encouraged pt to call back if she has any questions. Pt verbalized understanding.

## 2019-08-14 NOTE — TELEPHONE ENCOUNTER
Pt requesting lab results done yesterday, wont be able to answer until after 10:15 AM . Okay to leave detailed message if no answer

## 2019-08-30 ENCOUNTER — OFFICE VISIT (OUTPATIENT)
Dept: OTOLARYNGOLOGY | Facility: CLINIC | Age: 29
End: 2019-08-30
Payer: MEDICAID

## 2019-08-30 VITALS
WEIGHT: 115 LBS | SYSTOLIC BLOOD PRESSURE: 106 MMHG | BODY MASS INDEX: 20.37 KG/M2 | TEMPERATURE: 98 F | HEIGHT: 63 IN | DIASTOLIC BLOOD PRESSURE: 78 MMHG

## 2019-08-30 DIAGNOSIS — J33.0 ANTROCHOANAL POLYP: Primary | ICD-10-CM

## 2019-08-30 PROCEDURE — 99214 OFFICE O/P EST MOD 30 MIN: CPT | Performed by: OTOLARYNGOLOGY

## 2019-08-30 RX ORDER — FLUTICASONE PROPIONATE 50 MCG
2 SPRAY, SUSPENSION (ML) NASAL DAILY
Qty: 1 BOTTLE | Refills: 11 | Status: SHIPPED | OUTPATIENT
Start: 2019-08-30 | End: 2020-06-24

## 2019-08-30 NOTE — PROGRESS NOTES
Serafin Mora is a 30year old female.   Patient presents with:  Nose Problem: pt concern if nasal polyp has returned, feels sinus pressure not able to breathe through left nostril       HISTORY OF PRESENT ILLNESS  ERIN NOTES  She presents with a five-m any medications other than Claritin as needed    Social History    Socioeconomic History      Marital status: Single      Spouse name: Not on file      Number of children: Not on file      Years of education: Not on file      Highest education level: Not o Traci Willams MD at [address] [facility] MAIN OR   • NASAL SEPTOPLASTY BILAT SINUS ENDOSCOPY ETHM MAX Bilateral 3/20/2017    Performed by Svetlana Godoy MD at [address] [place] [address]   • REPAIR OF NASAL SEPTUM     • SINUS SURGERY        septoplasty, TBR         REVIEW OF SYSTEMS Submandibular. Anterior cervical. Posterior cervical. Supraclavicular.         Nose/Mouth/Throat Normal External nose - Normal.   Nasally I do not see any polyps although I cannot visualize the entire nasal cavity I do see the tip of the middle and inferior

## 2019-09-05 ENCOUNTER — TELEPHONE (OUTPATIENT)
Dept: OTOLARYNGOLOGY | Facility: CLINIC | Age: 29
End: 2019-09-05

## 2019-09-06 NOTE — TELEPHONE ENCOUNTER
Pt informed prior auth approved, # [id number] , valid from 09/05/19-10/05/19. Pt scheduled for follow up.

## 2019-09-11 ENCOUNTER — OFFICE VISIT (OUTPATIENT)
Dept: OTOLARYNGOLOGY | Facility: CLINIC | Age: 29
End: 2019-09-11
Payer: MEDICAID

## 2019-09-11 VITALS
SYSTOLIC BLOOD PRESSURE: 118 MMHG | WEIGHT: 115 LBS | BODY MASS INDEX: 20.37 KG/M2 | DIASTOLIC BLOOD PRESSURE: 75 MMHG | HEIGHT: 63 IN | TEMPERATURE: 97 F

## 2019-09-11 DIAGNOSIS — J33.0 ANTROCHOANAL POLYP: Primary | ICD-10-CM

## 2019-09-11 PROCEDURE — 99213 OFFICE O/P EST LOW 20 MIN: CPT | Performed by: OTOLARYNGOLOGY

## 2019-09-11 PROCEDURE — 31231 NASAL ENDOSCOPY DX: CPT | Performed by: OTOLARYNGOLOGY

## 2019-09-11 RX ORDER — TRAZODONE HYDROCHLORIDE 50 MG/1
TABLET ORAL
Refills: 1 | COMMUNITY
Start: 2019-05-16 | End: 2019-10-03

## 2019-09-11 NOTE — PROGRESS NOTES
Maynor Shaw is a 30year old female. Patient presents with: Follow - Up: recurrent antrochoanal polyp- for nasal endoscopy today      HISTORY OF PRESENT ILLNESS  ERIN NOTES  She presents with a five-month history of chronic left nasal obstruction. needed  9/11/2019  Symptoms at last visit a lot of pressure congestion worried about the fact that this is recurred as she gets very sick with ear infections nasal obstruction and sinusitis when she had this in the past.  She is here for a nasal endoscopy SCOPE,BX/RMV POLYP/DEBRID  11/09/2018   • NASAL SCOPY,RMV TISS MAXILL SINUS     • NASAL SCOPY,RMV TISS MAXILL SINUS  11/09/2018   • NASAL SEPTOPLASTY BILAT SINUS ENDOSCOPY ETHM MAX Bilateral 11/9/2018    Performed by Noah Mar MD at [address] [facility] MAIN OR   • NA Canal - Right: Normal, Left: Normal. TM - Right: Normal, Left: Normal.   Skin Normal Inspection - Normal.        Lymph Detail Normal Submental. Submandibular. Anterior cervical. Posterior cervical. Supraclavicular.         Nose/Mouth/Throat Normal External (VALIUM) 5 MG Oral Tab, Take 5 mg by mouth nightly as needed. , Disp: , Rfl: 0  •  traZODone HCl 50 MG Oral Tab, TK 1 T PO HS, Disp: , Rfl: 1  ASSESSMENT AND PLAN    1.   Recurrent antrochoanal polyp   Very surprising that she has this large antrochoanal p

## 2019-09-12 ENCOUNTER — TELEPHONE (OUTPATIENT)
Dept: OTOLARYNGOLOGY | Facility: CLINIC | Age: 29
End: 2019-09-12

## 2019-09-12 NOTE — TELEPHONE ENCOUNTER
Pending prior authorization for CT scan of the sinus  With case number  [id number], faxed all clinicals to [employer] [phone]

## 2019-09-16 NOTE — TELEPHONE ENCOUNTER
Called and spoke with the pt regarding approved prior authorization for CT of the sinus with approval number  [id number] valid from 9/12/19 until 10/27/19, advised pt to call her insurance company to verify out of the pocket expenses and co pays, pt verbal

## 2019-09-18 ENCOUNTER — HOSPITAL ENCOUNTER (OUTPATIENT)
Dept: CT IMAGING | Age: 29
Discharge: HOME | End: 2019-09-18
Attending: OTOLARYNGOLOGY
Payer: MEDICAID

## 2019-09-18 DIAGNOSIS — J33.0 ANTROCHOANAL POLYP: ICD-10-CM

## 2019-09-18 PROCEDURE — 70486 CT MAXILLOFACIAL W/O DYE: CPT | Performed by: OTOLARYNGOLOGY

## 2019-09-25 ENCOUNTER — OFFICE VISIT (OUTPATIENT)
Dept: OTOLARYNGOLOGY | Facility: CLINIC | Age: 29
End: 2019-09-25
Payer: MEDICAID

## 2019-09-25 VITALS
DIASTOLIC BLOOD PRESSURE: 72 MMHG | TEMPERATURE: 99 F | SYSTOLIC BLOOD PRESSURE: 108 MMHG | BODY MASS INDEX: 20.37 KG/M2 | HEIGHT: 63 IN | WEIGHT: 115 LBS

## 2019-09-25 DIAGNOSIS — J01.20 ACUTE NON-RECURRENT ETHMOIDAL SINUSITIS: Primary | ICD-10-CM

## 2019-09-25 PROCEDURE — 99214 OFFICE O/P EST MOD 30 MIN: CPT | Performed by: OTOLARYNGOLOGY

## 2019-09-25 RX ORDER — PREDNISONE 10 MG/1
TABLET ORAL
Qty: 11 TABLET | Refills: 0 | Status: SHIPPED | OUTPATIENT
Start: 2019-09-25 | End: 2019-10-03

## 2019-09-25 RX ORDER — AMOXICILLIN AND CLAVULANATE POTASSIUM 875; 125 MG/1; MG/1
1 TABLET, FILM COATED ORAL EVERY 12 HOURS
Qty: 28 TABLET | Refills: 0 | Status: SHIPPED | OUTPATIENT
Start: 2019-09-25 | End: 2019-10-09

## 2019-09-25 NOTE — PROGRESS NOTES
Beatriz Moreno is a 30year old female. Patient presents with: Follow - Up: CT scan result done on 9/18/19      HISTORY OF PRESENT ILLNESS  ERIN NOTES  She presents with a five-month history of chronic left nasal obstruction.  This all occurred after a last visit a lot of pressure congestion worried about the fact that this is recurred as she gets very sick with ear infections nasal obstruction and sinusitis when she had this in the past.  She is here for a nasal endoscopy  9/25/2019  She is here to [facility]   • LEEP PROCEDURE N/A 4/17/2017    Performed by Courtney Amado MD at [address] [place] [address] MAIN OR   • NASAL SCOPE,BX/RMV POLYP/DEBRID  11/09/2018   • NASAL SCOPY,RMV TISS MAXILL SINUS     • NASAL SCOPY,RMV TISS MAXILL SINUS  11/09/2018   • NASAL SEPTOPLASTY BILAT SI Normal. Tonsils - Normal. Oropharynx - Normal.   Ears Normal Inspection - Right: Normal, Left: Normal. Canal - Right: Normal, Left: Normal. TM - Right: Normal, Left: Normal.   Skin Normal Inspection - Normal.        Lymph Detail Normal Submental. Submandib have symptoms that worsen I would consider revision. She will call after taking the steroids and antibiotics.         Kateryna Williamson MD  9/25/2019

## 2019-10-03 ENCOUNTER — OFFICE VISIT (OUTPATIENT)
Dept: INTERNAL MEDICINE CLINIC | Facility: CLINIC | Age: 29
End: 2019-10-03
Payer: MEDICAID

## 2019-10-03 ENCOUNTER — LAB ENCOUNTER (OUTPATIENT)
Dept: LAB | Age: 29
End: 2019-10-03
Attending: INTERNAL MEDICINE
Payer: MEDICAID

## 2019-10-03 VITALS
SYSTOLIC BLOOD PRESSURE: 110 MMHG | BODY MASS INDEX: 21.79 KG/M2 | HEIGHT: 63 IN | HEART RATE: 79 BPM | WEIGHT: 123 LBS | DIASTOLIC BLOOD PRESSURE: 76 MMHG

## 2019-10-03 DIAGNOSIS — E87.6 HYPOKALEMIA: ICD-10-CM

## 2019-10-03 DIAGNOSIS — R14.0 BLOATING: ICD-10-CM

## 2019-10-03 DIAGNOSIS — R31.1 BENIGN ESSENTIAL MICROSCOPIC HEMATURIA: ICD-10-CM

## 2019-10-03 DIAGNOSIS — R10.31 RLQ ABDOMINAL PAIN: ICD-10-CM

## 2019-10-03 DIAGNOSIS — R10.31 RLQ ABDOMINAL PAIN: Primary | ICD-10-CM

## 2019-10-03 PROCEDURE — 80053 COMPREHEN METABOLIC PANEL: CPT

## 2019-10-03 PROCEDURE — 81003 URINALYSIS AUTO W/O SCOPE: CPT

## 2019-10-03 PROCEDURE — 99214 OFFICE O/P EST MOD 30 MIN: CPT | Performed by: INTERNAL MEDICINE

## 2019-10-03 PROCEDURE — 84443 ASSAY THYROID STIM HORMONE: CPT

## 2019-10-03 PROCEDURE — 36415 COLL VENOUS BLD VENIPUNCTURE: CPT

## 2019-10-03 PROCEDURE — 85025 COMPLETE CBC W/AUTO DIFF WBC: CPT

## 2019-10-03 PROCEDURE — 81025 URINE PREGNANCY TEST: CPT

## 2019-10-03 RX ORDER — RANITIDINE 150 MG/1
150 TABLET ORAL 2 TIMES DAILY
Qty: 60 TABLET | Refills: 0 | Status: SHIPPED | OUTPATIENT
Start: 2019-10-03 | End: 2020-06-24

## 2019-10-03 NOTE — PROGRESS NOTES
Silke Nj is a 30year old female.   Patient presents with:  Bloating: weight gain, change in bowel       HPI:   Pt comes as an urgent visit   C/c bloating x 2 mns   C/o gained 15 lbs in 2mns   Went from Size 0 to size 4   Watches what she eats and Ratna  2014   • Nasal scope,bx/rmv polyp/debrid  11/09/2018   • Nasal scopy,rmv tiss maxill sinus     • Nasal scopy,rmv tiss maxill sinus  11/09/2018   • Repair of nasal septum     • Sinus surgery        septoplasty, TBR      Social History:  Social History quadrant, no guarding or rigidity  tenderness  EXTREMITIES: no edema    ASSESSMENT AND PLAN:   Diagnoses and all orders for this visit:    RLQ abdominal pain  -     COMP METABOLIC PANEL (14); Future  -     CBC WITH DIFFERENTIAL WITH PLATELET;  Future  -

## 2019-10-03 NOTE — PATIENT INSTRUCTIONS
Low-Fiber Diet     Eggs are high in protein and easy to digest.   Eating a low-fiber diet means eating foods that don’t have much fiber. These foods are easy to digest.  Most of the fiber that you eat passes undigested through your bowel.  This is what fo · What to avoid: whole-wheat or whole-grain breads, crackers, and pasta; breads with seeds or nuts; wheat germ; glory crackers; cornbread; wild or brown rice; cereals with whole-grain, bran, and granola; cereals with seeds, nuts, coconut, or dried fruit; Other foods and drinks  · What to choose: water; plain gelatin; plain puddings; pretzels; plain cookies and cakes; honey, syrup; decaffeinated drinks, including tea and coffee    · What to avoid: popcorn; potato chips; spicy foods; fried, greasy foods; alc

## 2019-10-04 ENCOUNTER — APPOINTMENT (OUTPATIENT)
Dept: LAB | Facility: HOSPITAL | Age: 29
End: 2019-10-04
Attending: INTERNAL MEDICINE
Payer: MEDICAID

## 2019-10-04 DIAGNOSIS — R14.0 BLOATING: ICD-10-CM

## 2019-10-04 DIAGNOSIS — R10.31 RLQ ABDOMINAL PAIN: ICD-10-CM

## 2019-10-04 PROCEDURE — 87338 HPYLORI STOOL AG IA: CPT

## 2019-10-24 ENCOUNTER — HOSPITAL ENCOUNTER (OUTPATIENT)
Age: 29
Discharge: HOME | End: 2019-10-24
Payer: MEDICAID

## 2019-10-24 VITALS
RESPIRATION RATE: 16 BRPM | OXYGEN SATURATION: 100 % | DIASTOLIC BLOOD PRESSURE: 84 MMHG | TEMPERATURE: 98 F | SYSTOLIC BLOOD PRESSURE: 126 MMHG | HEART RATE: 93 BPM

## 2019-10-24 DIAGNOSIS — J01.40 ACUTE NON-RECURRENT PANSINUSITIS: Primary | ICD-10-CM

## 2019-10-24 PROCEDURE — 99214 OFFICE O/P EST MOD 30 MIN: CPT

## 2019-10-24 PROCEDURE — 87430 STREP A AG IA: CPT

## 2019-10-24 PROCEDURE — 99213 OFFICE O/P EST LOW 20 MIN: CPT

## 2019-10-24 RX ORDER — AZITHROMYCIN 250 MG/1
TABLET, FILM COATED ORAL
Qty: 1 PACKAGE | Refills: 0 | Status: SHIPPED | OUTPATIENT
Start: 2019-10-24 | End: 2019-10-29

## 2019-10-24 NOTE — ED INITIAL ASSESSMENT (HPI)
PATIENT ARRIVED AMBULATORY TO ROOM C/O A SORE THROAT THAT STARTED YESTERDAY. NO FEVERS. NO N/V/D. EASY NON LABORED RESPIRATIONS. NO DISTRESS.

## 2019-10-24 NOTE — ED PROVIDER NOTES
Patient presents with:  Sore Throat      HPI:     Prachi Hernandez is a 30year old female with no significant past medical history presents with a chief complaint of sore throat, cough, facial pain, pressure headache and sinus congestion which started a does feel like she has a sinus infection. I discussed with her she does have sinus tenderness on examination as well as length of symptoms we will treat for acute sinusitis.   I discussed with her Augmentin which she says that medication really upsets her

## 2019-11-20 ENCOUNTER — TELEPHONE (OUTPATIENT)
Dept: OBGYN CLINIC | Facility: CLINIC | Age: 29
End: 2019-11-20

## 2019-11-20 RX ORDER — DROSPIRENONE AND ETHINYL ESTRADIOL 0.03MG-3MG
1 KIT ORAL DAILY
Qty: 1 PACKAGE | Refills: 3 | Status: SHIPPED | OUTPATIENT
Start: 2019-11-20 | End: 2020-03-23

## 2019-11-20 NOTE — TELEPHONE ENCOUNTER
Pt states she was advised by Dr. Cinthya Ward that she could call to speak with nurse regarding getting birth control. Please advise.

## 2019-11-20 NOTE — TELEPHONE ENCOUNTER
Pt would like to go back on ocps. Pt was on Junel Fe for 3 years. The last few months on that pill she was spotting on and off 3 weeks out of the month. Pt was unsure if her body had gotten used to that pill.   Pt states she is having severe PMS, acne an

## 2019-11-20 NOTE — TELEPHONE ENCOUNTER
Pt informed of [facility]'s recs. RX sent. Pt instructed to call during the 4th pack for follow up. Pt advised it can take up to 3 months for her body to adjust to the new pill, so her symptoms may not go away right away.   Pt instructed to use a back up method

## 2019-11-20 NOTE — TELEPHONE ENCOUNTER
Plan to try luis -- start on first day of period (flow) & f/u during 4th pack -- if close to annual time, can do then.  Give 4 packs

## 2019-12-06 ENCOUNTER — TELEPHONE (OUTPATIENT)
Dept: OBGYN CLINIC | Facility: CLINIC | Age: 29
End: 2019-12-06

## 2019-12-06 DIAGNOSIS — Z32.00 PREGNANCY EXAMINATION OR TEST, PREGNANCY UNCONFIRMED: Primary | ICD-10-CM

## 2019-12-06 NOTE — TELEPHONE ENCOUNTER
C/O NO PERIOD FOR 63 DAYS NOW. WANTS TO GET BACK ON MARIA FERNANDA THAT WAS PRESCRIBED A FEW WEEKS AGO (11-20-19) SO IS ASKING FOR MED TO INDUCE PERIOD. HAD ANNUAL MAY 2019.

## 2019-12-06 NOTE — TELEPHONE ENCOUNTER
Pt states she would like to speak with nurse regarding medication that will help induce period. Please advise.

## 2019-12-07 NOTE — TELEPHONE ENCOUNTER
Informed pt that [facility] states that she needs blood hcg and if neg can call in luis to start that day. (Give enough to last until annual once hcg back.)  Pt states that she will do a  hcg and call that day for results.

## 2020-01-03 ENCOUNTER — HOSPITAL (OUTPATIENT)
Dept: OTHER | Age: 30
End: 2020-01-03

## 2020-01-03 LAB
ANALYZER ANC (IANC): NORMAL
BASOPHILS # BLD: 0 K/MCL (ref 0–0.3)
BASOPHILS NFR BLD: 0 %
DIFFERENTIAL METHOD BLD: NORMAL
EOSINOPHIL # BLD: 0.1 K/MCL (ref 0.1–0.5)
EOSINOPHIL NFR BLD: 1 %
ERYTHROCYTE [DISTWIDTH] IN BLOOD: 11.3 % (ref 11–15)
HCG SERPL-ACNC: ABNORMAL MUNITS/ML
HCG SERPL-ACNC: ABNORMAL M[IU]/ML
HCT VFR BLD CALC: 40.9 % (ref 36–46.5)
HGB BLD-MCNC: 14.3 G/DL (ref 12–15.5)
IMM GRANULOCYTES # BLD AUTO: 0 K/MCL (ref 0–0.2)
IMM GRANULOCYTES NFR BLD: 0 %
LYMPHOCYTES # BLD: 2.8 K/MCL (ref 1–4.8)
LYMPHOCYTES NFR BLD: 28 %
MCH RBC QN AUTO: 32.8 PG (ref 26–34)
MCHC RBC AUTO-ENTMCNC: 35 G/DL (ref 32–36.5)
MCV RBC AUTO: 93.8 FL (ref 78–100)
MONOCYTES # BLD: 0.5 K/MCL (ref 0.3–0.9)
MONOCYTES NFR BLD: 5 %
NEUTROPHILS # BLD: 6.4 K/MCL (ref 1.8–7.7)
NEUTROPHILS NFR BLD: 66 %
NEUTS SEG NFR BLD: NORMAL %
NRBC (NRBCRE): 0 /100 WBC
PLATELET # BLD: 203 K/MCL (ref 140–450)
RBC # BLD: 4.36 MIL/MCL (ref 4–5.2)
WBC # BLD: 9.9 K/MCL (ref 4.2–11)

## 2020-02-04 ENCOUNTER — HOSPITAL ENCOUNTER (OUTPATIENT)
Age: 30
Discharge: HOME | End: 2020-02-04
Payer: MEDICAID

## 2020-02-04 VITALS
HEIGHT: 63 IN | SYSTOLIC BLOOD PRESSURE: 121 MMHG | TEMPERATURE: 98 F | OXYGEN SATURATION: 100 % | BODY MASS INDEX: 22.15 KG/M2 | HEART RATE: 85 BPM | WEIGHT: 125 LBS | RESPIRATION RATE: 20 BRPM | DIASTOLIC BLOOD PRESSURE: 79 MMHG

## 2020-02-04 DIAGNOSIS — J06.9 UPPER RESPIRATORY VIRUS: Primary | ICD-10-CM

## 2020-02-04 LAB
POCT INFLUENZA A: NEGATIVE
POCT INFLUENZA B: NEGATIVE

## 2020-02-04 PROCEDURE — 99213 OFFICE O/P EST LOW 20 MIN: CPT

## 2020-02-04 PROCEDURE — 87502 INFLUENZA DNA AMP PROBE: CPT | Performed by: NURSE PRACTITIONER

## 2020-02-04 PROCEDURE — 99212 OFFICE O/P EST SF 10 MIN: CPT

## 2020-02-04 NOTE — ED PROVIDER NOTES
Patient presents with:  Cough/URI      HPI:     Yamil Mclean is a 30year old female who presents for evaluation and management of a chief complaint of nasal and sinus congestion, frontal sinus discomfort, postnasal drip, dry cough, body aches, and f control/protection: Condom, OCP        Comment: same partner x 2 years    Lifestyle      Physical activity:        Days per week: Not on file        Minutes per session: Not on file      Stress: Not on file    Relationships      Social connections: clear  EARS: TM  bilateral: normal  NOSE: nasal turbinates: swollen and red  THROAT: redness noted, uvula midline and airway patent  LUNGS: clear to auscultation bilaterally; no rales, rhonchi, or wheezes    MDM/Assessment/Plan:   Orders for this encounter

## 2020-03-09 ENCOUNTER — TELEPHONE (OUTPATIENT)
Dept: OBGYN CLINIC | Facility: CLINIC | Age: 30
End: 2020-03-09

## 2020-03-09 NOTE — TELEPHONE ENCOUNTER
Pt states she had 3 +HPTs and a few days later pt started bleeding. Pt did not call the office to inform us. Pt unsure of lmp \"my periods have been really weird and I haven't had a full menses since 11/2019\".  Pt states she started spotting on 2/20/2020 a

## 2020-03-09 NOTE — TELEPHONE ENCOUNTER
Pt had a miscarriage and has been bleeding for the past 2 weeks  States now theres an odor and thinks it might be an infection

## 2020-03-10 ENCOUNTER — LAB ENCOUNTER (OUTPATIENT)
Dept: LAB | Facility: HOSPITAL | Age: 30
End: 2020-03-10
Attending: OBSTETRICS & GYNECOLOGY
Payer: MEDICAID

## 2020-03-10 ENCOUNTER — OFFICE VISIT (OUTPATIENT)
Dept: OBGYN CLINIC | Facility: CLINIC | Age: 30
End: 2020-03-10
Payer: MEDICAID

## 2020-03-10 VITALS
BODY MASS INDEX: 23 KG/M2 | DIASTOLIC BLOOD PRESSURE: 70 MMHG | SYSTOLIC BLOOD PRESSURE: 103 MMHG | WEIGHT: 128.19 LBS | HEART RATE: 79 BPM

## 2020-03-10 DIAGNOSIS — O20.9 VAGINAL BLEEDING AFFECTING EARLY PREGNANCY: Primary | ICD-10-CM

## 2020-03-10 DIAGNOSIS — N89.8 VAGINAL ODOR: ICD-10-CM

## 2020-03-10 DIAGNOSIS — O20.9 VAGINAL BLEEDING AFFECTING EARLY PREGNANCY: ICD-10-CM

## 2020-03-10 LAB — B-HCG SERPL-ACNC: <1 MIU/ML

## 2020-03-10 PROCEDURE — 99213 OFFICE O/P EST LOW 20 MIN: CPT | Performed by: OBSTETRICS & GYNECOLOGY

## 2020-03-10 PROCEDURE — 84702 CHORIONIC GONADOTROPIN TEST: CPT

## 2020-03-10 PROCEDURE — 36415 COLL VENOUS BLD VENIPUNCTURE: CPT

## 2020-03-10 NOTE — PROGRESS NOTES
Prachi Hernandez is a 30year old female  No LMP recorded.  Patient presents with:  Gyn Problem: BLEEDING DUE TO MISCARRIAGE PER PATIENT, BAD ODOR ALSO -- had 3 positive home preg test mid Feb  Menstrual Problem: very VB x 10 days but now lightened education: Not on file      Highest education level: Not on file    Occupational History      Not on file    Social Needs      Financial resource strain: Not on file      Food insecurity:        Worry: Not on file        Inability: Not on file      Transpo Seat Belt: Yes        Self-Exams: Not Asked    Social History Narrative      Not on file      MEDICATIONS:    Current Outpatient Medications:   •  drospirenone-ethinyl estradiol (MARIA FERNANDA 28) 3-0.03 MG Oral Tab, Take 1 tablet by mouth daily. , Disp: 1 Packag mood and affect    Pelvic Exam:  External Genitalia:  normal appearance, hair distribution, and no lesions  Urethral Meatus:   normal in size, location, without lesions and prolapse  Bladder:    no fullness, masses or tenderness  Vagina:    normal appearan

## 2020-03-12 LAB
GENITAL VAGINOSIS SCREEN: POSITIVE
TRICHOMONAS SCREEN: NEGATIVE

## 2020-03-15 NOTE — PROGRESS NOTES
Call patient. Vaginal culture with bacterial vaginosis.   Give flagyl 500 mg po bid x 7d or cleocin ovules qhs x 3d

## 2020-03-16 ENCOUNTER — TELEPHONE (OUTPATIENT)
Dept: OBGYN CLINIC | Facility: CLINIC | Age: 30
End: 2020-03-16

## 2020-03-16 NOTE — TELEPHONE ENCOUNTER
----- Message from Aleisha Ramirez MD sent at 3/15/2020  4:36 PM CDT -----  Call patient. Vaginal culture with bacterial vaginosis.   Give flagyl 500 mg po bid x 7d or cleocin ovules qhs x 3d

## 2020-03-23 RX ORDER — DROSPIRENONE AND ETHINYL ESTRADIOL 0.03MG-3MG
KIT ORAL
Qty: 28 TABLET | Refills: 0 | Status: SHIPPED | OUTPATIENT
Start: 2020-03-23 | End: 2020-05-27

## 2020-04-16 RX ORDER — DROSPIRENONE AND ETHINYL ESTRADIOL 0.03MG-3MG
KIT ORAL
Qty: 28 TABLET | Refills: 0 | OUTPATIENT
Start: 2020-04-16

## 2020-04-17 RX ORDER — DROSPIRENONE AND ETHINYL ESTRADIOL 0.03MG-3MG
KIT ORAL
Qty: 28 TABLET | Refills: 0 | OUTPATIENT
Start: 2020-04-17

## 2020-05-27 ENCOUNTER — TELEPHONE (OUTPATIENT)
Dept: OBGYN CLINIC | Facility: CLINIC | Age: 30
End: 2020-05-27

## 2020-05-27 RX ORDER — DROSPIRENONE AND ETHINYL ESTRADIOL 0.03MG-3MG
1 KIT ORAL DAILY
Qty: 28 TABLET | Refills: 1 | Status: SHIPPED | OUTPATIENT
Start: 2020-05-27 | End: 2020-07-02

## 2020-05-27 NOTE — TELEPHONE ENCOUNTER
Pt requesting OCP refill. Informed pt she is due for annual exam as last annual was 5/7/2019. Assisted pt with scheduling first available appt with LUNA for 7/2/2020 at 1040am in [place]. Location information provided to pt. 2 month Rx sent to pt pharmacy.  Pt v

## 2020-06-24 ENCOUNTER — LAB ENCOUNTER (OUTPATIENT)
Dept: LAB | Facility: REFERENCE LAB | Age: 30
End: 2020-06-24
Attending: FAMILY MEDICINE
Payer: MEDICAID

## 2020-06-24 ENCOUNTER — OFFICE VISIT (OUTPATIENT)
Dept: INTEGRATIVE MEDICINE | Facility: CLINIC | Age: 30
End: 2020-06-24
Payer: MEDICAID

## 2020-06-24 VITALS
SYSTOLIC BLOOD PRESSURE: 122 MMHG | HEIGHT: 63 IN | WEIGHT: 123.81 LBS | BODY MASS INDEX: 21.94 KG/M2 | OXYGEN SATURATION: 97 % | HEART RATE: 76 BPM | DIASTOLIC BLOOD PRESSURE: 78 MMHG

## 2020-06-24 DIAGNOSIS — F41.9 ANXIETY: ICD-10-CM

## 2020-06-24 DIAGNOSIS — N94.6 DYSMENORRHEA: ICD-10-CM

## 2020-06-24 DIAGNOSIS — K58.0 IRRITABLE BOWEL SYNDROME WITH DIARRHEA: Primary | ICD-10-CM

## 2020-06-24 DIAGNOSIS — R53.82 CHRONIC FATIGUE: ICD-10-CM

## 2020-06-24 DIAGNOSIS — K58.0 IRRITABLE BOWEL SYNDROME WITH DIARRHEA: ICD-10-CM

## 2020-06-24 DIAGNOSIS — K50.90 CROHN'S DISEASE WITHOUT COMPLICATION, UNSPECIFIED GASTROINTESTINAL TRACT LOCATION (HCC): ICD-10-CM

## 2020-06-24 DIAGNOSIS — A60.09 HERPES GENITALIS IN WOMEN: ICD-10-CM

## 2020-06-24 DIAGNOSIS — R10.13 EPIGASTRIC PAIN: ICD-10-CM

## 2020-06-24 DIAGNOSIS — Z72.0 TOBACCO USE: ICD-10-CM

## 2020-06-24 PROCEDURE — 84144 ASSAY OF PROGESTERONE: CPT

## 2020-06-24 PROCEDURE — 36415 COLL VENOUS BLD VENIPUNCTURE: CPT

## 2020-06-24 PROCEDURE — 82728 ASSAY OF FERRITIN: CPT

## 2020-06-24 PROCEDURE — 83735 ASSAY OF MAGNESIUM: CPT

## 2020-06-24 PROCEDURE — 99205 OFFICE O/P NEW HI 60 MIN: CPT | Performed by: FAMILY MEDICINE

## 2020-06-24 PROCEDURE — 82627 DEHYDROEPIANDROSTERONE: CPT

## 2020-06-24 PROCEDURE — 84443 ASSAY THYROID STIM HORMONE: CPT

## 2020-06-24 PROCEDURE — 84140 ASSAY OF PREGNENOLONE: CPT

## 2020-06-24 PROCEDURE — 86800 THYROGLOBULIN ANTIBODY: CPT

## 2020-06-24 PROCEDURE — 84630 ASSAY OF ZINC: CPT

## 2020-06-24 PROCEDURE — 82607 VITAMIN B-12: CPT

## 2020-06-24 PROCEDURE — 84481 FREE ASSAY (FT-3): CPT

## 2020-06-24 PROCEDURE — 82306 VITAMIN D 25 HYDROXY: CPT

## 2020-06-24 PROCEDURE — 84439 ASSAY OF FREE THYROXINE: CPT

## 2020-06-24 PROCEDURE — 84207 ASSAY OF VITAMIN B-6: CPT

## 2020-06-24 PROCEDURE — 86628 CANDIDA ANTIBODY: CPT

## 2020-06-24 NOTE — PROGRESS NOTES
Arun Santos is a 35year old female. Patient presents with:  New Patient: weight gain , food sensitivity, thyroid check       HPI:       Diagnosed with Crohn's disease in the past, about 7 years ago, Was hospitalized. Also diagnosed with IBS.   Gets CURRENT MEDICATIONS:     Current Outpatient Medications   Medication Sig Dispense Refill   • drospirenone-ethinyl estradiol (OLGA) 3-0.03 MG Oral Tab Take 1 tablet by mouth daily.  28 tablet 1   • valACYclovir HCl 500 MG Oral Tab Take 1 tablet (500 mg tota Attends meetings of clubs or organizations: Not on file        Relationship status: Not on file      Intimate partner violence:        Fear of current or ex partner: Not on file        Emotionally abused: Not on file        Physically abused: Not on She has no cervical adenopathy. Neurological: She is alert and oriented to person, place, and time. No cranial nerve deficit. Gait normal.   Skin: Skin is warm and dry. Psychiatric: Affect normal.       ASSESSMENT AND PLAN:       1.  Irritable bowel s I spent a total of 45 minutes with the patient, over 50% of the time was spent counseling patient regarding diet, stress management, supplements and/or other treatment options.     Given further recommendations as below    Orders Placed This Visit:  Orders Directions: 1 capsule by mouth daily for 2 weeks, then increase to 1 capsule twice daily. After another 2 weeks increase to 1 capsule three times per day. Where to Find: [email or web address]. [email or web address]  Why: Aids in the repair and function of the gut Broccoli  Coffee  Corn  Chicken  Peanut  Tomato  White Potato  Almonds  Pineapple  [place]  Shrimp  Turmeric  Please note: The FIT 22 test does not come with the mobile phone yumiko or meal plan.           Return in about 4 weeks (around 7/22/2020) for [facility]

## 2020-06-24 NOTE — PATIENT INSTRUCTIONS
I have complete chuck in the body's ability to heal and transform. The products and items listed below (the “Products”)  and their claims have not been evaluated by the Food and Drug Administration.  Dietary products are not intended to treat, prevent, m outbreaks:  L-Lysine 1000mg    Why: to support immune system function  Directions: 1 tablet twice daily  Where: [employer], [employer] or online    KB[facility] FIT 22 - Food Sensitivity Testing  This test evaluates 22 of the most common food sensitivities te

## 2020-07-01 LAB
CANDIDA ANTIBODY IGA: 0.29 EV
CANDIDA ANTIBODY IGG: 1.36 EV
CANDIDA ANTIBODY IGM: 1.02 EV

## 2020-07-02 ENCOUNTER — APPOINTMENT (OUTPATIENT)
Dept: LAB | Age: 30
End: 2020-07-02
Attending: FAMILY MEDICINE
Payer: MEDICAID

## 2020-07-02 ENCOUNTER — OFFICE VISIT (OUTPATIENT)
Dept: OBGYN CLINIC | Facility: CLINIC | Age: 30
End: 2020-07-02
Payer: MEDICAID

## 2020-07-02 VITALS
DIASTOLIC BLOOD PRESSURE: 67 MMHG | BODY MASS INDEX: 22.25 KG/M2 | SYSTOLIC BLOOD PRESSURE: 103 MMHG | HEIGHT: 62.5 IN | WEIGHT: 124 LBS | HEART RATE: 75 BPM

## 2020-07-02 DIAGNOSIS — R53.82 CHRONIC FATIGUE: ICD-10-CM

## 2020-07-02 DIAGNOSIS — Z30.41 ORAL CONTRACEPTIVE PILL SURVEILLANCE: ICD-10-CM

## 2020-07-02 DIAGNOSIS — Z01.419 ENCOUNTER FOR GYNECOLOGICAL EXAMINATION WITHOUT ABNORMAL FINDING: Primary | ICD-10-CM

## 2020-07-02 PROCEDURE — 84630 ASSAY OF ZINC: CPT

## 2020-07-02 PROCEDURE — 36415 COLL VENOUS BLD VENIPUNCTURE: CPT

## 2020-07-02 PROCEDURE — 99395 PREV VISIT EST AGE 18-39: CPT | Performed by: OBSTETRICS & GYNECOLOGY

## 2020-07-02 RX ORDER — DROSPIRENONE AND ETHINYL ESTRADIOL 0.03MG-3MG
1 KIT ORAL DAILY
Qty: 3 PACKAGE | Refills: 3 | Status: SHIPPED | OUTPATIENT
Start: 2020-07-02

## 2020-07-02 NOTE — PROGRESS NOTES
Sheria Peabody is a 35year old female [id number] Patient's last menstrual period was 06/10/2020.  Patient presents with:  Gyn Exam: ANNUAL VISIT / BCP REFILL  Medication Request: currently on 2nd pack -- periods still heavier -- wishes to stay on current Multiple0  Live Births1      SOCIAL HISTORY:  Social History    Socioeconomic History      Marital status: Single      Spouse name: Not on file      Number of children: Not on file      Years of education: Not on file      Highest education level: Not on f Asked        Sleep Concern: No        Stress Concern: Yes        Weight Concern: No        Special Diet: No        Back Care: No        Exercise: Yes          ocassional        Bike Helmet: Not Asked        Seat Belt: Yes        Self-Exams: Not Asked    So adenopathy is noted  Breast:   normal without palpable masses, tenderness, asymmetry, nipple discharge, nipple retraction or skin changes  Abdomen:   soft, nontender, nondistended, no masses  Skin/Hair:  no unusual rashes or bruises  Extremities:  no edema

## 2020-07-06 LAB — ZINC: 69.4 UG/DL

## 2020-08-12 ENCOUNTER — OFFICE VISIT (OUTPATIENT)
Dept: OTOLARYNGOLOGY | Facility: CLINIC | Age: 30
End: 2020-08-12
Payer: MEDICAID

## 2020-08-12 VITALS
DIASTOLIC BLOOD PRESSURE: 66 MMHG | SYSTOLIC BLOOD PRESSURE: 99 MMHG | WEIGHT: 124 LBS | TEMPERATURE: 99 F | HEIGHT: 62.5 IN | BODY MASS INDEX: 22.25 KG/M2

## 2020-08-12 DIAGNOSIS — J32.0 CHRONIC LEFT MAXILLARY SINUSITIS: Primary | ICD-10-CM

## 2020-08-12 DIAGNOSIS — J32.2 CHRONIC ETHMOIDAL SINUSITIS: ICD-10-CM

## 2020-08-12 PROCEDURE — 3078F DIAST BP <80 MM HG: CPT | Performed by: OTOLARYNGOLOGY

## 2020-08-12 PROCEDURE — 31231 NASAL ENDOSCOPY DX: CPT | Performed by: OTOLARYNGOLOGY

## 2020-08-12 PROCEDURE — 3074F SYST BP LT 130 MM HG: CPT | Performed by: OTOLARYNGOLOGY

## 2020-08-12 PROCEDURE — 3008F BODY MASS INDEX DOCD: CPT | Performed by: OTOLARYNGOLOGY

## 2020-08-12 PROCEDURE — 99213 OFFICE O/P EST LOW 20 MIN: CPT | Performed by: OTOLARYNGOLOGY

## 2020-08-12 NOTE — PROGRESS NOTES
Silke Nj is a 35year old female.   Patient presents with:  Sinus Problem: c/o sinus pressure, sinus headache, post nasal drip for a year      HISTORY OF PRESENT ILLNESS  JDO NOTES  She presents with a five-month history of chronic left nasal obst needed  9/11/2019  Symptoms at last visit a lot of pressure congestion worried about the fact that this is recurred as she gets very sick with ear infections nasal obstruction and sinusitis when she had this in the past.  She is here for a nasal endoscopy Concern: No        Special Diet: No        Back Care: No        Exercise: Yes          ocassional        Seat Belt: Yes    Social History Narrative      Work - bartending, hair      1 child      Living with boyfriend.       Family History   Family history u (37.2 °C) (Tympanic)   Ht 5' 2.5\" (1.588 m)   Wt 124 lb (56.2 kg)   LMP 06/10/2020   BMI 22.32 kg/m²        Constitutional Normal Overall appearance - Normal.   Psychiatric Normal Orientation - Oriented to time, place, person & situation.  Appropriate mood by mouth nightly as needed. , Disp: , Rfl: 0  ASSESSMENT AND PLAN    1. Chronic sinusitis  He has a patent nasal airway there is some dark mucus that I noted through a patent maxillary antrostomy site.   She is not had any improvement in her symptoms after

## 2020-08-26 ENCOUNTER — HOSPITAL ENCOUNTER (OUTPATIENT)
Dept: CT IMAGING | Age: 30
Discharge: HOME | End: 2020-08-26
Attending: OTOLARYNGOLOGY
Payer: MEDICAID

## 2020-08-26 DIAGNOSIS — J32.0 CHRONIC LEFT MAXILLARY SINUSITIS: ICD-10-CM

## 2020-08-26 DIAGNOSIS — J32.2 CHRONIC ETHMOIDAL SINUSITIS: ICD-10-CM

## 2020-08-26 PROCEDURE — 70486 CT MAXILLOFACIAL W/O DYE: CPT | Performed by: OTOLARYNGOLOGY

## 2020-08-28 ENCOUNTER — OFFICE VISIT (OUTPATIENT)
Dept: OTOLARYNGOLOGY | Facility: CLINIC | Age: 30
End: 2020-08-28
Payer: MEDICAID

## 2020-08-28 VITALS — SYSTOLIC BLOOD PRESSURE: 120 MMHG | HEART RATE: 99 BPM | TEMPERATURE: 99 F | DIASTOLIC BLOOD PRESSURE: 81 MMHG

## 2020-08-28 DIAGNOSIS — J33.0 ANTROCHOANAL POLYP: Primary | ICD-10-CM

## 2020-08-28 DIAGNOSIS — J03.90 TONSILLITIS: ICD-10-CM

## 2020-08-28 PROCEDURE — 99214 OFFICE O/P EST MOD 30 MIN: CPT | Performed by: OTOLARYNGOLOGY

## 2020-08-28 PROCEDURE — 3074F SYST BP LT 130 MM HG: CPT | Performed by: OTOLARYNGOLOGY

## 2020-08-28 PROCEDURE — 3079F DIAST BP 80-89 MM HG: CPT | Performed by: OTOLARYNGOLOGY

## 2020-08-28 NOTE — PROGRESS NOTES
Silke Nj is a 35year old female. Patient presents with:  Results: Review sinus CT test results. HISTORY OF PRESENT ILLNESS  JDO NOTES  She presents with a five-month history of chronic left nasal obstruction.  This all occurred after a col visit a lot of pressure congestion worried about the fact that this is recurred as she gets very sick with ear infections nasal obstruction and sinusitis when she had this in the past.  She is here for a nasal endoscopy  9/25/2019  She is here to review he per week        Frequency: Monthly or less        Comment: 2-3 drinks every weekend      Drug use: No      Sexual activity: Yes        Partners: Male        Birth control/protection: Condom, OCP        Comment: same partner x 2 years    Other Topics      C heartbeat/palpitations and syncope. GI Negative Abdominal pain and diarrhea. Endocrine Negative Cold intolerance and heat intolerance. Neuro Negative Tremors. Psych Negative Anxiety and depression.    Integumentary Negative Frequent skin infections, Take 5 mg by mouth nightly as needed. , Disp: , Rfl: 0  •  valACYclovir HCl 500 MG Oral Tab, Take 1 tablet (500 mg total) by mouth daily.  Increase to bid for 3 days if acute outbreak (Patient not taking: Reported on 8/28/2020 ), Disp: 90 tablet, Rfl: 0  A

## 2020-09-15 ENCOUNTER — APPOINTMENT (OUTPATIENT)
Dept: LAB | Age: 30
End: 2020-09-15
Attending: OTOLARYNGOLOGY
Payer: MEDICAID

## 2020-09-15 DIAGNOSIS — Z01.818 PRE-OP TESTING: ICD-10-CM

## 2020-09-15 NOTE — H&P
Quintin Gomes is a 35year old female. No chief complaint on file. HISTORY OF PRESENT ILLNESS  ERIN NOTES  She presents with a five-month history of chronic left nasal obstruction. This all occurred after a cold.  Since that time she's not been ab worried about the fact that this is recurred as she gets very sick with ear infections nasal obstruction and sinusitis when she had this in the past.  She is here for a nasal endoscopy  9/25/2019  She is here to review her scan she feels a lot of pressure or less        Comment: 2-3 drinks every weekend      Drug use: No      Sexual activity: Yes        Partners: Male        Birth control/protection: Condom, OCP        Comment: same partner x 2 years    Other Topics      Concerns:        Blood Transfusions: GI Negative Abdominal pain and diarrhea. Endocrine Negative Cold intolerance and heat intolerance. Neuro Negative Tremors. Psych Negative Anxiety and depression. Integumentary Negative Frequent skin infections, pigment change and rash.    Hema/Lym ER, XL, 150 MG Oral Tablet 24 Hr, TK 1 T PO ONCE A DAY QAM, Disp: , Rfl: 2  •  diazepam (VALIUM) 5 MG Oral Tab, Take 5 mg by mouth nightly as needed.   , Disp: , Rfl: 0  ASSESSMENT AND PLAN    1.recurrent antrochoanal polyp  Recurrent and I do recommend com

## 2020-09-16 LAB — SARS-COV-2 RNA RESP QL NAA+PROBE: NOT DETECTED

## 2020-09-18 ENCOUNTER — HOSPITAL ENCOUNTER (OUTPATIENT)
Facility: HOSPITAL | Age: 30
Setting detail: HOSPITAL OUTPATIENT SURGERY
Discharge: HOME | End: 2020-09-18
Attending: OTOLARYNGOLOGY | Admitting: OTOLARYNGOLOGY
Payer: MEDICAID

## 2020-09-18 ENCOUNTER — ANESTHESIA (OUTPATIENT)
Dept: SURGERY | Facility: HOSPITAL | Age: 30
End: 2020-09-18
Payer: MEDICAID

## 2020-09-18 ENCOUNTER — TELEPHONE (OUTPATIENT)
Dept: OTOLARYNGOLOGY | Facility: CLINIC | Age: 30
End: 2020-09-18

## 2020-09-18 ENCOUNTER — ANESTHESIA EVENT (OUTPATIENT)
Dept: SURGERY | Facility: HOSPITAL | Age: 30
End: 2020-09-18
Payer: MEDICAID

## 2020-09-18 VITALS
OXYGEN SATURATION: 96 % | TEMPERATURE: 99 F | DIASTOLIC BLOOD PRESSURE: 64 MMHG | WEIGHT: 132 LBS | BODY MASS INDEX: 23.68 KG/M2 | RESPIRATION RATE: 12 BRPM | SYSTOLIC BLOOD PRESSURE: 111 MMHG | HEART RATE: 70 BPM | HEIGHT: 62.5 IN

## 2020-09-18 DIAGNOSIS — J33.9 NOSE POLYP: ICD-10-CM

## 2020-09-18 DIAGNOSIS — Z01.818 PRE-OP TESTING: Primary | ICD-10-CM

## 2020-09-18 DIAGNOSIS — J35.01 CHRONIC TONSILLITIS: ICD-10-CM

## 2020-09-18 LAB — B-HCG UR QL: NEGATIVE

## 2020-09-18 PROCEDURE — 09BR0ZZ EXCISION OF LEFT MAXILLARY SINUS, OPEN APPROACH: ICD-10-PCS | Performed by: OTOLARYNGOLOGY

## 2020-09-18 PROCEDURE — 0CTPXZZ RESECTION OF TONSILS, EXTERNAL APPROACH: ICD-10-PCS | Performed by: OTOLARYNGOLOGY

## 2020-09-18 PROCEDURE — 099R8ZZ DRAINAGE OF LEFT MAXILLARY SINUS, VIA NATURAL OR ARTIFICIAL OPENING ENDOSCOPIC: ICD-10-PCS | Performed by: OTOLARYNGOLOGY

## 2020-09-18 PROCEDURE — 42826 REMOVAL OF TONSILS: CPT | Performed by: OTOLARYNGOLOGY

## 2020-09-18 PROCEDURE — 31032 EXPLORE SINUS REMOVE POLYPS: CPT | Performed by: OTOLARYNGOLOGY

## 2020-09-18 RX ORDER — CEFDINIR 250 MG/5ML
7 POWDER, FOR SUSPENSION ORAL 2 TIMES DAILY
Qty: 120 ML | Refills: 0 | Status: SHIPPED | OUTPATIENT
Start: 2020-09-18 | End: 2020-09-25

## 2020-09-18 RX ORDER — DEXAMETHASONE 6 MG/1
TABLET ORAL
Qty: 7 TABLET | Refills: 0 | Status: SHIPPED | OUTPATIENT
Start: 2020-09-19 | End: 2020-09-24

## 2020-09-18 RX ORDER — AMOXICILLIN 500 MG/1
500 CAPSULE ORAL 3 TIMES DAILY
Qty: 21 CAPSULE | Refills: 0 | Status: SHIPPED | OUTPATIENT
Start: 2020-09-18 | End: 2020-09-18

## 2020-09-18 RX ORDER — DEXAMETHASONE SODIUM PHOSPHATE 4 MG/ML
VIAL (ML) INJECTION AS NEEDED
Status: DISCONTINUED | OUTPATIENT
Start: 2020-09-18 | End: 2020-09-18 | Stop reason: SURG

## 2020-09-18 RX ORDER — ACETAMINOPHEN 500 MG
1000 TABLET ORAL ONCE
Status: DISCONTINUED | OUTPATIENT
Start: 2020-09-18 | End: 2020-09-18 | Stop reason: HOSPADM

## 2020-09-18 RX ORDER — ONDANSETRON 2 MG/ML
4 INJECTION INTRAMUSCULAR; INTRAVENOUS ONCE AS NEEDED
Status: DISCONTINUED | OUTPATIENT
Start: 2020-09-18 | End: 2020-09-18

## 2020-09-18 RX ORDER — MIDAZOLAM HYDROCHLORIDE 1 MG/ML
INJECTION INTRAMUSCULAR; INTRAVENOUS AS NEEDED
Status: DISCONTINUED | OUTPATIENT
Start: 2020-09-18 | End: 2020-09-18 | Stop reason: SURG

## 2020-09-18 RX ORDER — METOCLOPRAMIDE 10 MG/1
10 TABLET ORAL ONCE
Status: DISCONTINUED | OUTPATIENT
Start: 2020-09-18 | End: 2020-09-18 | Stop reason: HOSPADM

## 2020-09-18 RX ORDER — LABETALOL HYDROCHLORIDE 5 MG/ML
5 INJECTION, SOLUTION INTRAVENOUS EVERY 10 MIN PRN
Status: DISCONTINUED | OUTPATIENT
Start: 2020-09-18 | End: 2020-09-18

## 2020-09-18 RX ORDER — FAMOTIDINE 20 MG/1
20 TABLET ORAL ONCE
Status: DISCONTINUED | OUTPATIENT
Start: 2020-09-18 | End: 2020-09-18 | Stop reason: HOSPADM

## 2020-09-18 RX ORDER — HYDROMORPHONE HYDROCHLORIDE 1 MG/ML
0.6 INJECTION, SOLUTION INTRAMUSCULAR; INTRAVENOUS; SUBCUTANEOUS EVERY 5 MIN PRN
Status: DISCONTINUED | OUTPATIENT
Start: 2020-09-18 | End: 2020-09-18

## 2020-09-18 RX ORDER — HYDROCODONE BITARTRATE AND ACETAMINOPHEN 5; 325 MG/1; MG/1
1 TABLET ORAL AS NEEDED
Status: DISCONTINUED | OUTPATIENT
Start: 2020-09-18 | End: 2020-09-18

## 2020-09-18 RX ORDER — CELECOXIB 200 MG/1
200 CAPSULE ORAL 2 TIMES DAILY
Qty: 28 CAPSULE | Refills: 0 | Status: SHIPPED | OUTPATIENT
Start: 2020-09-18 | End: 2020-10-02

## 2020-09-18 RX ORDER — SODIUM CHLORIDE, SODIUM LACTATE, POTASSIUM CHLORIDE, CALCIUM CHLORIDE 600; 310; 30; 20 MG/100ML; MG/100ML; MG/100ML; MG/100ML
INJECTION, SOLUTION INTRAVENOUS CONTINUOUS
Status: DISCONTINUED | OUTPATIENT
Start: 2020-09-18 | End: 2020-09-18

## 2020-09-18 RX ORDER — ONDANSETRON 2 MG/ML
INJECTION INTRAMUSCULAR; INTRAVENOUS AS NEEDED
Status: DISCONTINUED | OUTPATIENT
Start: 2020-09-18 | End: 2020-09-18 | Stop reason: SURG

## 2020-09-18 RX ORDER — MORPHINE SULFATE 4 MG/ML
2 INJECTION, SOLUTION INTRAMUSCULAR; INTRAVENOUS EVERY 10 MIN PRN
Status: DISCONTINUED | OUTPATIENT
Start: 2020-09-18 | End: 2020-09-18

## 2020-09-18 RX ORDER — LIDOCAINE HYDROCHLORIDE 10 MG/ML
INJECTION, SOLUTION EPIDURAL; INFILTRATION; INTRACAUDAL; PERINEURAL AS NEEDED
Status: DISCONTINUED | OUTPATIENT
Start: 2020-09-18 | End: 2020-09-18 | Stop reason: SURG

## 2020-09-18 RX ORDER — HYDROMORPHONE HYDROCHLORIDE 1 MG/ML
0.4 INJECTION, SOLUTION INTRAMUSCULAR; INTRAVENOUS; SUBCUTANEOUS EVERY 5 MIN PRN
Status: DISCONTINUED | OUTPATIENT
Start: 2020-09-18 | End: 2020-09-18

## 2020-09-18 RX ORDER — MORPHINE SULFATE 4 MG/ML
4 INJECTION, SOLUTION INTRAMUSCULAR; INTRAVENOUS EVERY 10 MIN PRN
Status: DISCONTINUED | OUTPATIENT
Start: 2020-09-18 | End: 2020-09-18

## 2020-09-18 RX ORDER — PROCHLORPERAZINE EDISYLATE 5 MG/ML
5 INJECTION INTRAMUSCULAR; INTRAVENOUS ONCE AS NEEDED
Status: DISCONTINUED | OUTPATIENT
Start: 2020-09-18 | End: 2020-09-18

## 2020-09-18 RX ORDER — HYDROCODONE BITARTRATE AND ACETAMINOPHEN 7.5; 325 MG/1; MG/1
1-2 TABLET ORAL EVERY 4 HOURS PRN
Qty: 20 TABLET | Refills: 0 | Status: SHIPPED | OUTPATIENT
Start: 2020-09-18 | End: 2020-09-18

## 2020-09-18 RX ORDER — HYDROCODONE BITARTRATE AND ACETAMINOPHEN 5; 325 MG/1; MG/1
2 TABLET ORAL AS NEEDED
Status: DISCONTINUED | OUTPATIENT
Start: 2020-09-18 | End: 2020-09-18

## 2020-09-18 RX ORDER — DIAPER,BRIEF,INFANT-TODD,DISP
EACH MISCELLANEOUS AS NEEDED
Status: DISCONTINUED | OUTPATIENT
Start: 2020-09-18 | End: 2020-09-18 | Stop reason: HOSPADM

## 2020-09-18 RX ORDER — HALOPERIDOL 5 MG/ML
0.25 INJECTION INTRAMUSCULAR ONCE AS NEEDED
Status: DISCONTINUED | OUTPATIENT
Start: 2020-09-18 | End: 2020-09-18

## 2020-09-18 RX ORDER — MORPHINE SULFATE 10 MG/ML
6 INJECTION, SOLUTION INTRAMUSCULAR; INTRAVENOUS EVERY 10 MIN PRN
Status: DISCONTINUED | OUTPATIENT
Start: 2020-09-18 | End: 2020-09-18

## 2020-09-18 RX ORDER — NALOXONE HYDROCHLORIDE 0.4 MG/ML
80 INJECTION, SOLUTION INTRAMUSCULAR; INTRAVENOUS; SUBCUTANEOUS AS NEEDED
Status: DISCONTINUED | OUTPATIENT
Start: 2020-09-18 | End: 2020-09-18

## 2020-09-18 RX ORDER — HYDROMORPHONE HYDROCHLORIDE 1 MG/ML
0.2 INJECTION, SOLUTION INTRAMUSCULAR; INTRAVENOUS; SUBCUTANEOUS EVERY 5 MIN PRN
Status: DISCONTINUED | OUTPATIENT
Start: 2020-09-18 | End: 2020-09-18

## 2020-09-18 RX ORDER — LIDOCAINE HYDROCHLORIDE AND EPINEPHRINE 10; 10 MG/ML; UG/ML
INJECTION, SOLUTION INFILTRATION; PERINEURAL AS NEEDED
Status: DISCONTINUED | OUTPATIENT
Start: 2020-09-18 | End: 2020-09-18 | Stop reason: HOSPADM

## 2020-09-18 RX ORDER — DEXAMETHASONE 6 MG/1
TABLET ORAL
Qty: 4 TABLET | Refills: 0 | Status: SHIPPED | OUTPATIENT
Start: 2020-09-19 | End: 2020-09-18

## 2020-09-18 RX ADMIN — DEXAMETHASONE SODIUM PHOSPHATE 4 MG: 4 MG/ML VIAL (ML) INJECTION at 07:58:00

## 2020-09-18 RX ADMIN — SODIUM CHLORIDE, SODIUM LACTATE, POTASSIUM CHLORIDE, CALCIUM CHLORIDE: 600; 310; 30; 20 INJECTION, SOLUTION INTRAVENOUS at 08:39:00

## 2020-09-18 RX ADMIN — MIDAZOLAM HYDROCHLORIDE 2 MG: 1 INJECTION INTRAMUSCULAR; INTRAVENOUS at 07:50:00

## 2020-09-18 RX ADMIN — LIDOCAINE HYDROCHLORIDE 50 MG: 10 INJECTION, SOLUTION EPIDURAL; INFILTRATION; INTRACAUDAL; PERINEURAL at 07:50:00

## 2020-09-18 RX ADMIN — ONDANSETRON 4 MG: 2 INJECTION INTRAMUSCULAR; INTRAVENOUS at 07:58:00

## 2020-09-18 RX ADMIN — SODIUM CHLORIDE, SODIUM LACTATE, POTASSIUM CHLORIDE, CALCIUM CHLORIDE: 600; 310; 30; 20 INJECTION, SOLUTION INTRAVENOUS at 07:44:00

## 2020-09-18 NOTE — INTERVAL H&P NOTE
Pre-op Diagnosis: Nose polyp [J33.9]  Chronic tonsillitis [J35.01]    The above referenced H&P was reviewed by Dolly Vaughan MD on 9/18/2020, the patient was examined and no significant changes have occurred in the patient's condition since the H&P was per

## 2020-09-18 NOTE — ANESTHESIA PROCEDURE NOTES
Airway  Date/Time: 9/18/2020 7:52 AM  Urgency: Elective    Airway not difficult    General Information and Staff    Patient location during procedure: OR  Anesthesiologist: Malgorzata Nielson MD  Performed: anesthesiologist     Indications and Patient Con

## 2020-09-18 NOTE — ANESTHESIA PREPROCEDURE EVALUATION
Anesthesia PreOp Note    HPI:     Raghu Da Silva is a 35year old female who presents for preoperative consultation requested by: Lisebt Gonzalez MD    Date of Surgery: 9/18/2020    Procedure(s):  NASAL SEPTOPLASTY BILAT SINUS ENDOSCOPY ETHM MAX  TONSIL Bilateral 3/20/2017    Performed by Harlan Beltran MD at [address] [place] [address] MAIN OR   • REPAIR OF NASAL SEPTUM     • SINUS SURGERY        septoplasty, TBR       drospirenone-ethinyl estradiol (OLGA) 3-0.03 MG Oral Tab, Take 1 tablet by mouth daily. , Disp: 3 Package, Rf standard drinks        Types: 1 - 2 Standard drinks or equivalent per week        Frequency: 2-4 times a month        Comment: 2-3 drinks every weekend      Drug use: No      Sexual activity: Yes        Partners: Male        Birth control/protection: Condo depression,       GI/Hepatic/Renal      Comments: IBS    Endo/Other    Abdominal  - normal exam               Anesthesia Plan:   ASA:  3  Plan:   General  Airway:  ETT  Informed Consent Plan and Risks Discussed With:  Patient      I have informed Erik Son

## 2020-09-18 NOTE — BRIEF OP NOTE
Pre-Operative Diagnosis: Recurrent  Antrochoanal polyp left maxillary sinus [J33.9]  Chronic tonsillitis [J35.01]     Post-Operative Diagnosis:same      Procedure Performed:   Procedure(s):  endoscopic nasal polypectomy, trivedi sergio, bilateral tonsillecto

## 2020-09-18 NOTE — ANESTHESIA POSTPROCEDURE EVALUATION
Patient: Batsheva Pelaez    Procedure Summary     Date:  09/18/20 Room / Location:  [address] [place] [address] MAIN OR 03 / [facility] MAIN OR    Anesthesia Start:  8126 Anesthesia Stop:      Procedures:       NASAL SEPTOPLASTY BILAT SINUS ENDOSCOPY ETHM MAX (Bilateral Nose)      TO

## 2020-09-18 NOTE — TELEPHONE ENCOUNTER
taryn calling states they have to RX for the following medications need clarification on which one to fill please advise         dexamethasone 6 MG Oral Tab 7 tablet 0 2020   Si po daily for7 days     Route:   (none)     Order #:

## 2020-09-18 NOTE — OPERATIVE REPORT
Melvin Manzano  DATE OF SURGERY: 9/18/2020    PREOPERATIVE DIAGNOSIS:  Recurrent left antrochoanal polyp Chronic tonsillitis  POSTOPERATIVE DIAGNOSIS: Same.   OPERATIVE PROCEDURE:    Combined endoscopic and left Aviles sergio removal of recurrent left an of the patent maxillary antrostomy site and a large antral choanal polyp was again noted. The lip was then retracted. An incision was created the previously described location with care to preserve and inferior cuff of mucosa for ease of closure.   Wallace

## 2020-09-19 NOTE — TELEPHONE ENCOUNTER
Per pt doing well post op day 1 endo nasal polypectomy and tonsillectomy. Pt is post op endo polypectomy . Per  Pt pt is doing well no bleeding, advised pt no bending or heavy lifting for the next week and pt is not to blow nose until seen by JK.  Advised p

## 2020-09-21 ENCOUNTER — TELEPHONE (OUTPATIENT)
Dept: OTOLARYNGOLOGY | Facility: CLINIC | Age: 30
End: 2020-09-21

## 2020-09-21 NOTE — TELEPHONE ENCOUNTER
Mother calling for a refill on the following medication listed below please advise       Is aware will have to pay out of pocket please advise       Medication Quantity Refills Start End   HYDROcodone-acetaminophen 7.5-325 MG/15ML Oral Solution 300 mL 0 9/

## 2020-09-25 ENCOUNTER — OFFICE VISIT (OUTPATIENT)
Dept: OTOLARYNGOLOGY | Facility: CLINIC | Age: 30
End: 2020-09-25
Payer: MEDICAID

## 2020-09-25 VITALS — TEMPERATURE: 98 F | BODY MASS INDEX: 23.68 KG/M2 | WEIGHT: 132 LBS | HEIGHT: 62.5 IN

## 2020-09-25 DIAGNOSIS — J03.90 TONSILLITIS: ICD-10-CM

## 2020-09-25 DIAGNOSIS — J32.0 CHRONIC LEFT MAXILLARY SINUSITIS: Primary | ICD-10-CM

## 2020-09-25 PROCEDURE — 99024 POSTOP FOLLOW-UP VISIT: CPT | Performed by: OTOLARYNGOLOGY

## 2020-09-25 PROCEDURE — 3008F BODY MASS INDEX DOCD: CPT | Performed by: OTOLARYNGOLOGY

## 2020-09-25 RX ORDER — METHYLPREDNISOLONE 4 MG/1
TABLET ORAL
Qty: 1 PACKAGE | Refills: 0 | Status: SHIPPED | OUTPATIENT
Start: 2020-09-25 | End: 2021-07-26 | Stop reason: ALTCHOICE

## 2020-09-25 NOTE — PROGRESS NOTES
Cristina Patel is a 35year old female. Patient presents with:  Post-Op: s/p endo nasal polypectomy done on 9/18/20      HISTORY OF PRESENT ILLNESS  JDO NOTES  She presents with a five-month history of chronic left nasal obstruction.  This all occurred needed  9/11/2019  Symptoms at last visit a lot of pressure congestion worried about the fact that this is recurred as she gets very sick with ear infections nasal obstruction and sinusitis when she had this in the past.  She is here for a nasal endoscopy Types: Cigarettes      Smokeless tobacco: Never Used    Substance and Sexual Activity      Alcohol use:  Yes        Alcohol/week: 1.0 - 2.0 standard drinks        Types: 1 - 2 Standard drinks or equivalent per week        Frequency: 2-4 times a month BILAT SINUS ENDOSCOPY ETHM MAX Bilateral 3/20/2017    Performed by Rosina Castro MD at [address]   • REPAIR OF NASAL SEPTUM     • SINUS SURGERY        septoplasty, TBR   • TONSILLECTOMY Bilateral 9/18/2020    Performed by Sharyle Flatness, MD at [address] [facility] Supraclavicular.         Nose/Mouth/Throat Normal External nose - Normal.  Intranasal normal maxillary os  patent  Lip suture line intact                  Current Outpatient Medications:   •  HYDROcodone-acetaminophen 7.5-325 MG/15ML Oral Solution, Take 15

## 2020-09-28 ENCOUNTER — TELEPHONE (OUTPATIENT)
Dept: OTOLARYNGOLOGY | Facility: CLINIC | Age: 30
End: 2020-09-28

## 2020-09-28 NOTE — TELEPHONE ENCOUNTER
Dr Cyrus Paul patient stated still having pain to her cheek near the stitches and throat asking for refill of hydrocodone,pt follow up appt on 10/5/20,please advise.

## 2020-09-28 NOTE — TELEPHONE ENCOUNTER
Per mom pt continues to have pain after surgery, asking for hydrocodone refill. Please advise thank you.

## 2020-10-05 ENCOUNTER — OFFICE VISIT (OUTPATIENT)
Dept: OTOLARYNGOLOGY | Facility: CLINIC | Age: 30
End: 2020-10-05
Payer: MEDICAID

## 2020-10-05 VITALS
SYSTOLIC BLOOD PRESSURE: 103 MMHG | DIASTOLIC BLOOD PRESSURE: 66 MMHG | HEART RATE: 81 BPM | HEIGHT: 62.5 IN | BODY MASS INDEX: 23.33 KG/M2 | WEIGHT: 130 LBS

## 2020-10-05 DIAGNOSIS — J33.0 ANTROCHOANAL POLYP: Primary | ICD-10-CM

## 2020-10-05 PROCEDURE — 3008F BODY MASS INDEX DOCD: CPT | Performed by: OTOLARYNGOLOGY

## 2020-10-05 PROCEDURE — 3078F DIAST BP <80 MM HG: CPT | Performed by: OTOLARYNGOLOGY

## 2020-10-05 PROCEDURE — 3074F SYST BP LT 130 MM HG: CPT | Performed by: OTOLARYNGOLOGY

## 2020-10-05 PROCEDURE — 99024 POSTOP FOLLOW-UP VISIT: CPT | Performed by: OTOLARYNGOLOGY

## 2020-10-05 NOTE — PROGRESS NOTES
Jesse August is a 35year old female. Patient presents with:  Post-Op: 2 nd post endo removal of polyp      HISTORY OF PRESENT ILLNESS  JDO NOTES  She presents with a five-month history of chronic left nasal obstruction.  This all occurred after a co visit a lot of pressure congestion worried about the fact that this is recurred as she gets very sick with ear infections nasal obstruction and sinusitis when she had this in the past.  She is here for a nasal endoscopy  9/25/2019  She is here to review he status: Current Every Day Smoker        Packs/day: 0.50        Years: 10.00        Pack years: 5        Types: Cigarettes      Smokeless tobacco: Never Used    Substance and Sexual Activity      Alcohol use:  Yes        Alcohol/week: 1.0 - 2.0 standard drin ETHM MAX Bilateral 11/9/2018    Performed by Aneta Victoria MD at [facility] MAIN OR   • NASAL SEPTOPLASTY BILAT SINUS ENDOSCOPY ETHM MAX Bilateral 3/20/2017    Performed by Ashwin Naidu MD at [address]     • SINUS SURGERY        s Inspection - Normal.        Lymph Detail Normal Submental. Submandibular. Anterior cervical. Posterior cervical. Supraclavicular.         Nose/Mouth/Throat Normal External nose - Normal.  Intranasal normal maxillary os  patent  Lip suture line intact

## 2020-10-09 ENCOUNTER — HOSPITAL ENCOUNTER (OUTPATIENT)
Age: 30
Discharge: HOME | End: 2020-10-09
Attending: EMERGENCY MEDICINE
Payer: MEDICAID

## 2020-10-09 VITALS
HEART RATE: 75 BPM | WEIGHT: 120 LBS | SYSTOLIC BLOOD PRESSURE: 117 MMHG | TEMPERATURE: 99 F | RESPIRATION RATE: 20 BRPM | HEIGHT: 62.5 IN | DIASTOLIC BLOOD PRESSURE: 77 MMHG | OXYGEN SATURATION: 100 % | BODY MASS INDEX: 21.53 KG/M2

## 2020-10-09 DIAGNOSIS — J02.9 EXUDATIVE PHARYNGITIS: Primary | ICD-10-CM

## 2020-10-09 DIAGNOSIS — Z90.89 STATUS POST TONSILLECTOMY: ICD-10-CM

## 2020-10-09 PROCEDURE — 99214 OFFICE O/P EST MOD 30 MIN: CPT

## 2020-10-09 PROCEDURE — 87081 CULTURE SCREEN ONLY: CPT | Performed by: EMERGENCY MEDICINE

## 2020-10-09 PROCEDURE — 87430 STREP A AG IA: CPT

## 2020-10-09 RX ORDER — AMOXICILLIN 875 MG/1
875 TABLET, COATED ORAL 2 TIMES DAILY
Qty: 20 TABLET | Refills: 0 | Status: SHIPPED | OUTPATIENT
Start: 2020-10-09 | End: 2020-10-19

## 2020-10-10 NOTE — ED PROVIDER NOTES
Patient Seen in: [facility]      History   Patient presents with:  Fatigue    Stated Complaint: fatigue, sore throat    HPI    The patient is a 78-year-old female with past history of Crohn's disease, chronic sinusitis, 3 Family history reviewed with patient/caregiver and is not pertinent to presenting problem.     Social History    Tobacco Use      Smoking status: Current Every Day Smoker        Packs/day: 0.50        Years: 10.00        Pack years: 5        Types: Ci Normal   SARS-COV-2 RNA,QUAL RT-PCR ([facility])   GRP A STREP CULT, THROAT          Pulse ox is 100% on room air, normal.  Vital signs are stable  Patient's negative rapid strep was discussed with her.   Will send for culture as the posterior pharynx is abnorma

## 2020-10-10 NOTE — ED INITIAL ASSESSMENT (HPI)
Feeling fatigued since yesterday, denies fever, + chills, h/o tonsillectomy and polypectomy 9/27, denies sick contact for covid

## 2020-11-30 ENCOUNTER — HOSPITAL ENCOUNTER (OUTPATIENT)
Age: 30
Discharge: HOME | End: 2020-11-30
Attending: EMERGENCY MEDICINE
Payer: MEDICAID

## 2020-11-30 VITALS
BODY MASS INDEX: 21.26 KG/M2 | WEIGHT: 120 LBS | HEIGHT: 63 IN | DIASTOLIC BLOOD PRESSURE: 69 MMHG | RESPIRATION RATE: 18 BRPM | SYSTOLIC BLOOD PRESSURE: 124 MMHG | OXYGEN SATURATION: 99 % | TEMPERATURE: 98 F | HEART RATE: 96 BPM

## 2020-11-30 DIAGNOSIS — J06.9 VIRAL UPPER RESPIRATORY ILLNESS: Primary | ICD-10-CM

## 2020-11-30 PROCEDURE — 87430 STREP A AG IA: CPT

## 2020-11-30 PROCEDURE — 99213 OFFICE O/P EST LOW 20 MIN: CPT

## 2020-11-30 RX ORDER — PSEUDOEPHEDRINE HCL 120 MG/1
120 TABLET, FILM COATED, EXTENDED RELEASE ORAL EVERY 12 HOURS PRN
Qty: 10 TABLET | Refills: 0 | Status: SHIPPED | OUTPATIENT
Start: 2020-11-30 | End: 2020-12-30

## 2020-11-30 RX ORDER — FLUTICASONE PROPIONATE 50 MCG
2 SPRAY, SUSPENSION (ML) NASAL DAILY
Qty: 16 G | Refills: 0 | Status: SHIPPED | OUTPATIENT
Start: 2020-11-30 | End: 2020-12-30

## 2020-11-30 NOTE — ED INITIAL ASSESSMENT (HPI)
PATIENT WITH SORE THROAT, HEADACHE AND COUGH STARTING Saturday. ALSO C/O LEFT SIDED SINUS PAIN/PRESSURE. HX OF LEFT SIDED SINUS SX AND TONSILLECTOMY IN September. DENIES FEVERS. DENIES ILL CONTACTS.

## 2020-11-30 NOTE — ED PROVIDER NOTES
Patient Seen in: [facility]      History   Patient presents with:  Sore Throat    Stated Complaint: sore throat    HPI    78-year-old female presents for evaluation for a sore throat and sinus congestion for the past 2 days.   She reports some pertinent to presenting problem. Social History    Tobacco Use      Smoking status: Current Every Day Smoker        Packs/day: 0.50        Years: 10.00        Pack years: 5        Types: Cigarettes      Smokeless tobacco: Never Used    Alcohol use:  Yes Oropharynx is clear. Uvula midline. Posterior oropharyngeal erythema present. No oropharyngeal exudate or uvula swelling. Tonsils: No tonsillar exudate or tonsillar abscesses.    Eyes:      General: Lids are normal.      Extraocular Movements: Extraocu found. Clinical impression as well as any lab results and radiology findings were discussed with the patient. Patient is advised to follow up with PCP for reevaluation. Return precautions were given.  Patient voices understanding and agreement with the

## 2020-12-05 RX ORDER — VALACYCLOVIR HYDROCHLORIDE 500 MG/1
TABLET, FILM COATED ORAL
Qty: 90 TABLET | Refills: 0 | OUTPATIENT
Start: 2020-12-05

## 2020-12-08 RX ORDER — VALACYCLOVIR HYDROCHLORIDE 500 MG/1
500 TABLET, FILM COATED ORAL DAILY
Qty: 90 TABLET | Refills: 0 | Status: CANCELLED | OUTPATIENT
Start: 2020-12-08

## 2020-12-09 ENCOUNTER — PATIENT MESSAGE (OUTPATIENT)
Dept: OBGYN CLINIC | Facility: CLINIC | Age: 30
End: 2020-12-09

## 2020-12-09 NOTE — TELEPHONE ENCOUNTER
Last annual= 7/2/2020    Message to [facility]- okay for Valacyclovir 500 mg refill until next annual due? Pt uses for suppression.

## 2020-12-09 NOTE — TELEPHONE ENCOUNTER
From: Giulia Aldrich  To: Rashmi Duran MD  Sent: 12/9/2020 11:34 AM CST  Subject: Prescription Question    Hi was sent a message regarding a refill request for my valtrex prescription.  I'm usually prescribed 30 at a time and I take them for suppress

## 2020-12-10 RX ORDER — VALACYCLOVIR HYDROCHLORIDE 500 MG/1
500 TABLET, FILM COATED ORAL DAILY
Qty: 90 TABLET | Refills: 1 | Status: SHIPPED | OUTPATIENT
Start: 2020-12-10

## 2021-01-21 ENCOUNTER — TELEPHONE (OUTPATIENT)
Dept: OBGYN CLINIC | Facility: CLINIC | Age: 31
End: 2021-01-21

## 2021-01-21 NOTE — TELEPHONE ENCOUNTER
Pt calling to report that she is currently on Valtrex daily suppression. RX that was sent for Valtrex in December was for 90 tabs with 1 refill to cover pt until she is due for her next annual. Pt stated she only received 30 tabs.      Called pharmacy and s

## 2021-01-21 NOTE — TELEPHONE ENCOUNTER
Patient is wondering if a different prescription can be ordered for her, or if refills can be available. She would like something that is based on supressing the outbreak not just treating it. Please advise.

## 2021-02-02 ENCOUNTER — APPOINTMENT (OUTPATIENT)
Dept: CT IMAGING | Facility: HOSPITAL | Age: 31
End: 2021-02-02
Attending: EMERGENCY MEDICINE
Payer: MEDICAID

## 2021-02-02 ENCOUNTER — HOSPITAL ENCOUNTER (EMERGENCY)
Facility: HOSPITAL | Age: 31
Discharge: HOME | End: 2021-02-02
Attending: EMERGENCY MEDICINE
Payer: MEDICAID

## 2021-02-02 ENCOUNTER — TELEPHONE (OUTPATIENT)
Dept: INTEGRATIVE MEDICINE | Facility: CLINIC | Age: 31
End: 2021-02-02

## 2021-02-02 VITALS
WEIGHT: 125 LBS | TEMPERATURE: 98 F | DIASTOLIC BLOOD PRESSURE: 82 MMHG | SYSTOLIC BLOOD PRESSURE: 116 MMHG | OXYGEN SATURATION: 100 % | BODY MASS INDEX: 22 KG/M2 | RESPIRATION RATE: 18 BRPM | HEART RATE: 67 BPM

## 2021-02-02 DIAGNOSIS — R10.13 ABDOMINAL PAIN, EPIGASTRIC: Primary | ICD-10-CM

## 2021-02-02 DIAGNOSIS — K52.9 ILEITIS: ICD-10-CM

## 2021-02-02 LAB
ALBUMIN SERPL-MCNC: 3.8 G/DL (ref 3.4–5)
ALP LIVER SERPL-CCNC: 64 U/L
ALT SERPL-CCNC: 16 U/L
ANION GAP SERPL CALC-SCNC: 8 MMOL/L (ref 0–18)
AST SERPL-CCNC: 9 U/L (ref 15–37)
B-HCG UR QL: NEGATIVE
BASOPHILS # BLD AUTO: 0.02 X10(3) UL (ref 0–0.2)
BASOPHILS NFR BLD AUTO: 0.2 %
BILIRUB DIRECT SERPL-MCNC: <0.1 MG/DL (ref 0–0.2)
BILIRUB SERPL-MCNC: 0.4 MG/DL (ref 0.1–2)
BILIRUB UR QL: NEGATIVE
BUN BLD-MCNC: 13 MG/DL (ref 7–18)
BUN/CREAT SERPL: 17.1 (ref 10–20)
CALCIUM BLD-MCNC: 9.6 MG/DL (ref 8.5–10.1)
CHLORIDE SERPL-SCNC: 109 MMOL/L (ref 98–112)
CLARITY UR: CLEAR
CO2 SERPL-SCNC: 22 MMOL/L (ref 21–32)
COLOR UR: YELLOW
CREAT BLD-MCNC: 0.76 MG/DL
DEPRECATED RDW RBC AUTO: 43.6 FL (ref 35.1–46.3)
EOSINOPHIL # BLD AUTO: 0.14 X10(3) UL (ref 0–0.7)
EOSINOPHIL NFR BLD AUTO: 1.7 %
ERYTHROCYTE [DISTWIDTH] IN BLOOD BY AUTOMATED COUNT: 13.2 % (ref 11–15)
GLUCOSE BLD-MCNC: 98 MG/DL (ref 70–99)
GLUCOSE UR-MCNC: NEGATIVE MG/DL
HCT VFR BLD AUTO: 39.3 %
HGB BLD-MCNC: 12.9 G/DL
HGB UR QL STRIP.AUTO: NEGATIVE
IMM GRANULOCYTES # BLD AUTO: 0.03 X10(3) UL (ref 0–1)
IMM GRANULOCYTES NFR BLD: 0.4 %
KETONES UR-MCNC: NEGATIVE MG/DL
LEUKOCYTE ESTERASE UR QL STRIP.AUTO: NEGATIVE
LIPASE SERPL-CCNC: 142 U/L (ref 73–393)
LYMPHOCYTES # BLD AUTO: 2.62 X10(3) UL (ref 1–4)
LYMPHOCYTES NFR BLD AUTO: 32.1 %
M PROTEIN MFR SERPL ELPH: 7.8 G/DL (ref 6.4–8.2)
MCH RBC QN AUTO: 29.3 PG (ref 26–34)
MCHC RBC AUTO-ENTMCNC: 32.8 G/DL (ref 31–37)
MCV RBC AUTO: 89.1 FL
MONOCYTES # BLD AUTO: 0.34 X10(3) UL (ref 0.1–1)
MONOCYTES NFR BLD AUTO: 4.2 %
NEUTROPHILS # BLD AUTO: 5.01 X10 (3) UL (ref 1.5–7.7)
NEUTROPHILS # BLD AUTO: 5.01 X10(3) UL (ref 1.5–7.7)
NEUTROPHILS NFR BLD AUTO: 61.4 %
NITRITE UR QL STRIP.AUTO: NEGATIVE
OSMOLALITY SERPL CALC.SUM OF ELEC: 288 MOSM/KG (ref 275–295)
PH UR: 5 [PH] (ref 5–8)
PLATELET # BLD AUTO: 223 10(3)UL (ref 150–450)
POTASSIUM SERPL-SCNC: 3.6 MMOL/L (ref 3.5–5.1)
PROT UR-MCNC: NEGATIVE MG/DL
RBC # BLD AUTO: 4.41 X10(6)UL
SODIUM SERPL-SCNC: 139 MMOL/L (ref 136–145)
SP GR UR STRIP: 1.01 (ref 1–1.03)
UROBILINOGEN UR STRIP-ACNC: <2
WBC # BLD AUTO: 8.2 X10(3) UL (ref 4–11)

## 2021-02-02 PROCEDURE — 83690 ASSAY OF LIPASE: CPT | Performed by: EMERGENCY MEDICINE

## 2021-02-02 PROCEDURE — 85025 COMPLETE CBC W/AUTO DIFF WBC: CPT | Performed by: EMERGENCY MEDICINE

## 2021-02-02 PROCEDURE — 74177 CT ABD & PELVIS W/CONTRAST: CPT | Performed by: EMERGENCY MEDICINE

## 2021-02-02 PROCEDURE — 99284 EMERGENCY DEPT VISIT MOD MDM: CPT

## 2021-02-02 PROCEDURE — 96361 HYDRATE IV INFUSION ADD-ON: CPT

## 2021-02-02 PROCEDURE — 80076 HEPATIC FUNCTION PANEL: CPT | Performed by: EMERGENCY MEDICINE

## 2021-02-02 PROCEDURE — 81025 URINE PREGNANCY TEST: CPT

## 2021-02-02 PROCEDURE — 80048 BASIC METABOLIC PNL TOTAL CA: CPT | Performed by: EMERGENCY MEDICINE

## 2021-02-02 PROCEDURE — 96374 THER/PROPH/DIAG INJ IV PUSH: CPT

## 2021-02-02 PROCEDURE — 81003 URINALYSIS AUTO W/O SCOPE: CPT | Performed by: EMERGENCY MEDICINE

## 2021-02-02 RX ORDER — OMEPRAZOLE 20 MG/1
20 CAPSULE, DELAYED RELEASE ORAL DAILY
Qty: 30 CAPSULE | Refills: 0 | Status: SHIPPED | OUTPATIENT
Start: 2021-02-02 | End: 2021-03-04

## 2021-02-02 RX ORDER — MORPHINE SULFATE 4 MG/ML
INJECTION, SOLUTION INTRAMUSCULAR; INTRAVENOUS
Status: COMPLETED
Start: 2021-02-02 | End: 2021-02-02

## 2021-02-02 RX ORDER — DICYCLOMINE HCL 20 MG
20 TABLET ORAL 4 TIMES DAILY PRN
Qty: 30 TABLET | Refills: 0 | Status: SHIPPED | OUTPATIENT
Start: 2021-02-02 | End: 2021-03-04

## 2021-02-02 RX ORDER — MORPHINE SULFATE 4 MG/ML
2 INJECTION, SOLUTION INTRAMUSCULAR; INTRAVENOUS ONCE
Status: COMPLETED | OUTPATIENT
Start: 2021-02-02 | End: 2021-02-02

## 2021-02-02 NOTE — TELEPHONE ENCOUNTER
Patient stated she was in to see provider for Food Sensitivity testing but now recently experiencing an increase in pain and discomfort in stomach, requesting referral for GI.

## 2021-02-02 NOTE — TELEPHONE ENCOUNTER
Spoke with pt, said she has a hx of crohn's - will feel pain in lower abd on right side. For the past few months however, c/o sever pain near bottom of her rib cage.  Pt describes as \"a brick made of lava\" and c/o excruciating stabbing pain that will last

## 2021-02-03 NOTE — ED PROVIDER NOTES
Patient Seen in: [facility] AND [facility] Emergency Department      History   Patient presents with:  Abdomen/Flank Pain    Stated Complaint: Abdominal Pain     HPI/Subjective:   HPI    Patient presents to the emergency department complaining of progressive upp Performed by Dung Paredes MD at [address] [facility] MAIN OR   • NASAL SEPTOPLASTY BILAT SINUS ENDOSCOPY ETHM MAX Bilateral 3/20/2017    Performed by Cherylene Moulder, MD at [address] [facility] [address]   • REPAIR OF NASAL SEPTUM     • SINUS SURGERY        septoplasty, TBR   • TONSILLEC abdominal tenderness in the right upper quadrant, epigastric area and left upper quadrant. There is no guarding or rebound. Musculoskeletal: Normal range of motion. General: No tenderness. Skin:     General: Skin is warm and dry.       Findings: intra-abdominal process. 3. Uncomplicated sigmoid colonic diverticulosis.     Dictated by (CST): Evans Ervin MD on 2/02/2021 at 7:50 PM     Finalized by (CST): Evans Ervin MD on 2/02/2021 at 7:57 PM            Radiology exams  Viewed and revie

## 2021-02-03 NOTE — ED NOTES
Pt received in-room awake and alert, respirations nonlabored, speaking in full sentences. ABD pain 7/10. Pt medicated per MD order. Awaiting CT.

## 2021-02-04 NOTE — H&P
[address] [facility] [address] Gastroenterology                                                                                                  Clinic History and Physical     Pa follow-up. She reports a history of Crohn's disease versus IBS. She has had different opinions from her prior gastroenterologists as detailed above. She has been off all medication for IBD since at least [id number].     She describes a lifelong histor impairment     glasses      Past Surgical History:   Procedure Laterality Date   • COLONOSCOPY  2/2013   • EXCISION TURBINATE     • LEEP  2014   • LEEP PROCEDURE N/A 4/17/2017    Performed by Navid Jara MD at [address] [place] [address] OR   • NASAL SCOPE,BX/RMV POLYP/D (20 mg total) by mouth daily. 30 capsule 0   • valACYclovir HCl 500 MG Oral Tab Take 1 tablet (500 mg total) by mouth daily.  Increase to bid for 3 days if acute outbreak 90 tablet 1   • drospirenone-ethinyl estradiol (OLGA) 3-0.03 MG Oral Tab Take 1 table cyanosis, clubbing or edema is evident.    Neuro: Alert and oriented x4, and patient is having movements of all 4 extremities   Psych: Pt has a normal mood and affect, behavior is normal    Nursing note and vitals reviewed    Labs/Imaging:     Patient's lab demonstrates questionable bowel wall thickening and hyperenhancement in the terminal ileum which could relate to mild ileitis versus artifactual.  In light of the patient's questionable IBD versus IBS history, I would favor stool testing to rule out acute patient's responsibility to contact his/her insurance company regarding questions about out-of-pocket cost/benefits and was provided the appropriate diagnostic information/codes. All questions were answered to the patient’s satisfaction.  The patient signed

## 2021-02-16 ENCOUNTER — OFFICE VISIT (OUTPATIENT)
Dept: GASTROENTEROLOGY | Facility: CLINIC | Age: 31
End: 2021-02-16
Payer: MEDICAID

## 2021-02-16 ENCOUNTER — TELEPHONE (OUTPATIENT)
Dept: GASTROENTEROLOGY | Facility: CLINIC | Age: 31
End: 2021-02-16

## 2021-02-16 VITALS
DIASTOLIC BLOOD PRESSURE: 72 MMHG | TEMPERATURE: 99 F | SYSTOLIC BLOOD PRESSURE: 109 MMHG | HEART RATE: 81 BPM | WEIGHT: 132 LBS | BODY MASS INDEX: 23 KG/M2

## 2021-02-16 DIAGNOSIS — R10.10 UPPER ABDOMINAL PAIN: Primary | ICD-10-CM

## 2021-02-16 DIAGNOSIS — R93.89 ABNORMAL CT SCAN: ICD-10-CM

## 2021-02-16 DIAGNOSIS — K58.9 IRRITABLE BOWEL SYNDROME, UNSPECIFIED TYPE: ICD-10-CM

## 2021-02-16 DIAGNOSIS — R93.5 ABNORMAL ABDOMINAL CT SCAN: ICD-10-CM

## 2021-02-16 DIAGNOSIS — R19.8 IRREGULAR BOWEL HABITS: ICD-10-CM

## 2021-02-16 PROCEDURE — 3078F DIAST BP <80 MM HG: CPT | Performed by: NURSE PRACTITIONER

## 2021-02-16 PROCEDURE — 99204 OFFICE O/P NEW MOD 45 MIN: CPT | Performed by: NURSE PRACTITIONER

## 2021-02-16 PROCEDURE — 3074F SYST BP LT 130 MM HG: CPT | Performed by: NURSE PRACTITIONER

## 2021-02-16 RX ORDER — BUSPIRONE HYDROCHLORIDE 15 MG/1
15 TABLET ORAL 2 TIMES DAILY
COMMUNITY
Start: 2021-02-04

## 2021-02-16 NOTE — PATIENT INSTRUCTIONS
-Schedule colonoscopy w/Dr. Viktor Alegria or Dr. Abby García w/ [facility] or [facility]   Dx: irregular bowel habits   -Eligible for NE: Yes  -Prep: Split dose Colyte/TriLyte or equivalent  -Anti-platelets and anti-coagulants: None  -Diabetes meds: None    ** If MAC @

## 2021-02-16 NOTE — TELEPHONE ENCOUNTER
Schedulers, see note below:    -Patient will call us when she has her work schedule     -Schedule colonoscopy w/Dr. Maral Escamilla or Dr. Luis A Valdez w/ [facility] or [facility]   -Eligible for NE: Yes  -Prep: Split dose Colyte/TriLyte or equivalent  -Anti-platelets and

## 2021-02-18 ENCOUNTER — LAB ENCOUNTER (OUTPATIENT)
Dept: LAB | Facility: HOSPITAL | Age: 31
End: 2021-02-18
Attending: NURSE PRACTITIONER
Payer: MEDICAID

## 2021-02-18 DIAGNOSIS — R19.8 IRREGULAR BOWEL HABITS: ICD-10-CM

## 2021-02-18 LAB
CRYPTOSP AG STL QL IA: NEGATIVE
G LAMBLIA AG STL QL IA: NEGATIVE

## 2021-02-18 PROCEDURE — 87046 STOOL CULTR AEROBIC BACT EA: CPT

## 2021-02-18 PROCEDURE — 87493 C DIFF AMPLIFIED PROBE: CPT

## 2021-02-18 PROCEDURE — 87077 CULTURE AEROBIC IDENTIFY: CPT

## 2021-02-18 PROCEDURE — 87272 CRYPTOSPORIDIUM AG IF: CPT

## 2021-02-18 PROCEDURE — 83993 ASSAY FOR CALPROTECTIN FECAL: CPT

## 2021-02-18 PROCEDURE — 87329 GIARDIA AG IA: CPT

## 2021-02-18 PROCEDURE — 87427 SHIGA-LIKE TOXIN AG IA: CPT

## 2021-02-18 PROCEDURE — 87045 FECES CULTURE AEROBIC BACT: CPT

## 2021-02-19 LAB — C DIFF TOX B STL QL: NEGATIVE

## 2021-02-19 NOTE — TELEPHONE ENCOUNTER
Scheduled for:  Colonoscopy [address]  Provider Name:  Dr. Massimo Moore  Date:  4/27/21  Location:    [facility]  Sedation:  MAC  Time:  0830 (pt is aware to arrive at 0730)   Prep:  Colyte/Trilyte, sent via Mychart  Meds/Allergies Reconciled?:  Physician reviewed   Roxanne Bruce

## 2021-02-19 NOTE — TELEPHONE ENCOUNTER
Kristie Garcia--    This patient is scheduled    Please send her prep to the pharmacy on file.  Thank you    You may close this TE when done :)

## 2021-02-21 LAB — CALPROTECTIN STL-MCNT: 19.1 ΜG/G (ref ?–50)

## 2021-02-22 ENCOUNTER — HOSPITAL ENCOUNTER (OUTPATIENT)
Age: 31
Discharge: HOME | End: 2021-02-22
Attending: PHYSICIAN ASSISTANT
Payer: MEDICAID

## 2021-02-22 VITALS
OXYGEN SATURATION: 100 % | SYSTOLIC BLOOD PRESSURE: 120 MMHG | RESPIRATION RATE: 18 BRPM | TEMPERATURE: 98 F | DIASTOLIC BLOOD PRESSURE: 79 MMHG | HEART RATE: 102 BPM

## 2021-02-22 DIAGNOSIS — R31.9 URINARY TRACT INFECTION WITH HEMATURIA, SITE UNSPECIFIED: Primary | ICD-10-CM

## 2021-02-22 DIAGNOSIS — N39.0 URINARY TRACT INFECTION WITH HEMATURIA, SITE UNSPECIFIED: Primary | ICD-10-CM

## 2021-02-22 LAB
B-HCG UR QL: NEGATIVE
GLUCOSE UR STRIP-MCNC: NEGATIVE MG/DL
KETONES UR STRIP-MCNC: NEGATIVE MG/DL
NITRITE UR QL STRIP: NEGATIVE
PH UR STRIP: 6 [PH]
PROT UR STRIP-MCNC: 100 MG/DL
SP GR UR STRIP: >=1.03
UROBILINOGEN UR STRIP-ACNC: <2 MG/DL

## 2021-02-22 PROCEDURE — 81025 URINE PREGNANCY TEST: CPT

## 2021-02-22 PROCEDURE — 81002 URINALYSIS NONAUTO W/O SCOPE: CPT

## 2021-02-22 PROCEDURE — 87086 URINE CULTURE/COLONY COUNT: CPT | Performed by: PHYSICIAN ASSISTANT

## 2021-02-22 PROCEDURE — 99214 OFFICE O/P EST MOD 30 MIN: CPT

## 2021-02-22 PROCEDURE — 87077 CULTURE AEROBIC IDENTIFY: CPT | Performed by: PHYSICIAN ASSISTANT

## 2021-02-22 PROCEDURE — 87186 SC STD MICRODIL/AGAR DIL: CPT | Performed by: PHYSICIAN ASSISTANT

## 2021-02-22 RX ORDER — CEPHALEXIN 500 MG/1
500 CAPSULE ORAL 3 TIMES DAILY
Qty: 21 CAPSULE | Refills: 0 | Status: SHIPPED | OUTPATIENT
Start: 2021-02-22 | End: 2021-03-01

## 2021-02-22 RX ORDER — SODIUM, POTASSIUM,MAG SULFATES 17.5-3.13G
SOLUTION, RECONSTITUTED, ORAL ORAL
Qty: 1 BOTTLE | Refills: 0 | Status: SHIPPED | OUTPATIENT
Start: 2021-02-22 | End: 2021-07-26 | Stop reason: ALTCHOICE

## 2021-02-22 RX ORDER — PHENAZOPYRIDINE HYDROCHLORIDE 200 MG/1
200 TABLET, FILM COATED ORAL 3 TIMES DAILY PRN
Qty: 6 TABLET | Refills: 0 | Status: SHIPPED | OUTPATIENT
Start: 2021-02-22 | End: 2021-03-01

## 2021-02-22 NOTE — ED PROVIDER NOTES
Patient Seen in: [facility]    History   Patient presents with:  Urinary Symptoms    Stated Complaint: uti    HPI    Raphael Villanueva is a 35year old female who presents with chief complaint of dysuria.   Onset 30 minutes prior to arrival. REPAIR OF NASAL SEPTUM     • SINUS SURGERY        septoplasty, TBR   • TONSILLECTOMY     • TONSILLECTOMY Bilateral 9/18/2020    Performed by Aneta Victoria MD at [facility] MAIN OR       Medications :   cephALEXin (KEFLEX) 500 MG Oral Cap,  Take 1 capsule (500 mg use: No      Review of Systems    Positive for stated complaint: uti  Other systems are as noted in HPI. Constitutional and vital signs reviewed. All other systems reviewed and negative except as noted above.     PSFH elements reviewed from today and rash.        ED Course     Labs Reviewed   [facility] POCT URINALYSIS DIPSTICK - Abnormal; Notable for the following components:       Result Value    Urine Clarity Cloudy (*)     Protein urine 100  (*)     Bilirubin, Urine Small (*)     Blood, Urine Large (*)

## 2021-02-22 NOTE — ED INITIAL ASSESSMENT (HPI)
Patient with dysuria, frequency and urgency approximately 30 minutes prior to arrival.  Denies flank and back pain. Denies fevers.

## 2021-02-22 NOTE — TELEPHONE ENCOUNTER
Scheduling: Suprep/MoviPrep would be appropriate. TriLyte/Colyte could also be considered if the former is not available.   I would not recommend a MiraLAX/Gatorade solution as the patient does have a past history of constipation issues and this could resu

## 2021-02-26 ENCOUNTER — OFFICE VISIT (OUTPATIENT)
Dept: AUDIOLOGY | Facility: CLINIC | Age: 31
End: 2021-02-26
Payer: MEDICAID

## 2021-02-26 ENCOUNTER — OFFICE VISIT (OUTPATIENT)
Dept: OTOLARYNGOLOGY | Facility: CLINIC | Age: 31
End: 2021-02-26
Payer: MEDICAID

## 2021-02-26 VITALS
TEMPERATURE: 98 F | BODY MASS INDEX: 23 KG/M2 | DIASTOLIC BLOOD PRESSURE: 68 MMHG | SYSTOLIC BLOOD PRESSURE: 120 MMHG | WEIGHT: 130 LBS

## 2021-02-26 DIAGNOSIS — H69.93 EUSTACHIAN TUBE DISORDER, BILATERAL: Primary | ICD-10-CM

## 2021-02-26 DIAGNOSIS — H69.82 EUSTACHIAN TUBE DYSFUNCTION, LEFT: Primary | ICD-10-CM

## 2021-02-26 PROCEDURE — 3074F SYST BP LT 130 MM HG: CPT | Performed by: OTOLARYNGOLOGY

## 2021-02-26 PROCEDURE — 99214 OFFICE O/P EST MOD 30 MIN: CPT | Performed by: OTOLARYNGOLOGY

## 2021-02-26 PROCEDURE — 92557 COMPREHENSIVE HEARING TEST: CPT | Performed by: AUDIOLOGIST

## 2021-02-26 PROCEDURE — 92567 TYMPANOMETRY: CPT | Performed by: AUDIOLOGIST

## 2021-02-26 PROCEDURE — 3078F DIAST BP <80 MM HG: CPT | Performed by: OTOLARYNGOLOGY

## 2021-02-26 RX ORDER — FLUTICASONE PROPIONATE 50 MCG
2 SPRAY, SUSPENSION (ML) NASAL DAILY
Qty: 1 BOTTLE | Refills: 11 | Status: SHIPPED | OUTPATIENT
Start: 2021-02-26 | End: 2021-03-28

## 2021-02-26 NOTE — PROGRESS NOTES
Paras Cuello is a 35year old female. Patient presents with:  Ear Problem: Feeling pain ,muffled hearing of left ear.   Nose Problem: Congestion      HISTORY OF PRESENT ILLNESS   8/12/2020  She has had a persistent left-sided periorbital frontal and level: Not on file    Tobacco Use      Smoking status: Current Some Day Smoker        Packs/day: 0.50        Years: 10.00        Pack years: 5        Types: Cigarettes      Smokeless tobacco: Never Used    Substance and Sexual Activity      Alcohol use: Timle Marina 9/18/2020    Performed by Alessandro Macias MD at [address] [place] [address] MAIN OR   • NASAL SEPTOPLASTY BILAT SINUS ENDOSCOPY ETHM MAX Bilateral 11/9/2018    Performed by Alessandro Macias MD at [address] [place] [address] MAIN OR   • NASAL SEPTOPLASTY BILAT SINUS ENDOSCOPY ETHM MAX Bilateral 3/20/2017 Normal, Left: Normal. Canal - Right: Normal, Left: Normal. TM - Right: Normal, Left: Normal.  Audiometric assessment was done tympanograms were normal so then we proceeded with an audiogram which revealed absolutely normal hearing with an SRT of 10 on the needed. , Disp: , Rfl: 0  •  methylPREDNISolone 4 MG Oral Tablet Therapy Pack, Take by oral route.  (Patient not taking: Reported on 2/26/2021 ), Disp: 1 Package, Rfl: 0  ASSESSMENT AND PLAN    1.recurrent antrochoanal polyp  No evidence of recurrence    2

## 2021-02-26 NOTE — PROGRESS NOTES
AUDIOLOGY REPORT      Wilian Loco is a 35year old female     Referring Provider: Bharath Lei   YOB: 1990  Medical Record: [id number]      Patient Hearing History:  Patient reported sensation of muffled hearing.        Otoscopic Inspec

## 2021-03-01 ENCOUNTER — TELEPHONE (OUTPATIENT)
Dept: OBGYN CLINIC | Facility: CLINIC | Age: 31
End: 2021-03-01

## 2021-03-01 DIAGNOSIS — R30.0 DYSURIA: Primary | ICD-10-CM

## 2021-03-01 NOTE — TELEPHONE ENCOUNTER
Informed pt that NJ wants her to come in for a mid stream urine culture. Results will be back within 24 hrs if still with an uti, which abx will work. Question for MD on Call, [address], pt has some antibiotic left, Cephalexin.  She has two pills to take tod

## 2021-03-01 NOTE — TELEPHONE ENCOUNTER
Needs to come in for mid stream urine culture.  Result will be back w/in 24 hours if still w/ UTI & which abx will work

## 2021-03-01 NOTE — TELEPHONE ENCOUNTER
Yes the goal in the culture is to see if the infection is clearing. ...if she doesn't get improvement and still has UTI then we should switch to macrobid 100mg BID x 7 day

## 2021-03-01 NOTE — TELEPHONE ENCOUNTER
Per pt was treated for a UTI at [facility] states she's still having symptoms.  Please advise

## 2021-03-01 NOTE — TELEPHONE ENCOUNTER
States went to urgent care and received abx cephalexin last Wednesday for UTI tx.  has been taking 500mg 1 cap 3 times a day.  States still has symptoms of \"discomfort all the time and only time no discomfort is when urinating\" States will be leavin

## 2021-03-02 ENCOUNTER — LAB ENCOUNTER (OUTPATIENT)
Dept: LAB | Age: 31
End: 2021-03-02
Attending: OBSTETRICS & GYNECOLOGY
Payer: MEDICAID

## 2021-03-02 DIAGNOSIS — R30.0 DYSURIA: ICD-10-CM

## 2021-03-02 PROCEDURE — 87086 URINE CULTURE/COLONY COUNT: CPT

## 2021-03-03 ENCOUNTER — OFFICE VISIT (OUTPATIENT)
Dept: OBGYN CLINIC | Facility: CLINIC | Age: 31
End: 2021-03-03
Payer: MEDICAID

## 2021-03-03 ENCOUNTER — TELEPHONE (OUTPATIENT)
Dept: OBGYN CLINIC | Facility: CLINIC | Age: 31
End: 2021-03-03

## 2021-03-03 VITALS
DIASTOLIC BLOOD PRESSURE: 85 MMHG | WEIGHT: 127.38 LBS | HEART RATE: 86 BPM | SYSTOLIC BLOOD PRESSURE: 124 MMHG | BODY MASS INDEX: 23 KG/M2

## 2021-03-03 DIAGNOSIS — N89.8 VAGINAL DISCHARGE: ICD-10-CM

## 2021-03-03 DIAGNOSIS — N94.10 DYSPAREUNIA, FEMALE: Primary | ICD-10-CM

## 2021-03-03 PROCEDURE — 3074F SYST BP LT 130 MM HG: CPT | Performed by: OBSTETRICS & GYNECOLOGY

## 2021-03-03 PROCEDURE — 3079F DIAST BP 80-89 MM HG: CPT | Performed by: OBSTETRICS & GYNECOLOGY

## 2021-03-03 PROCEDURE — 99213 OFFICE O/P EST LOW 20 MIN: CPT | Performed by: OBSTETRICS & GYNECOLOGY

## 2021-03-03 NOTE — PROGRESS NOTES
Linda Slade is a 35year old female [id number] Patient's last menstrual period was 02/25/2021.  Patient presents with:  Gyn Problem: VAGINAL D/C AND PAINFUL IC -- in  hot tub last week then coitus painful with partner; noted white d/c. urgent care treat Camille Stevens MD at [address] [place] [address] MAIN OR     OB History     T1    L1    SAB1  TAB0  Ectopic0  Multiple0  Live Births1      SOCIAL HISTORY:  Social History    Socioeconomic History      Marital status: Single      Spouse name: Not on file      Number of childre Caffeine Concern: No        Occupational Exposure: Not Asked        Hobby Hazards: Not Asked        Sleep Concern: No        Stress Concern: Yes        Weight Concern: No        Special Diet: No        Back Care: No        Exercise: Yes          ocassional heartburn, abdominal pain, diarrhea or constipation  Genitourinary:    denies dysuria, incontinence, abnormal vaginal discharge, vaginal itching  Musculoskeletal:   denies back pain. Skin/Breast:    denies any breast pain, lumps, or discharge.    Neurologi culture      Spent total time 20 minutes on obtaining history / chart review, evaluating patient / performing medically appropriate exam, discussing treatment options, counseling / educating, and completing documentation, coordinating care.

## 2021-03-03 NOTE — TELEPHONE ENCOUNTER
C/O of white vaginal discharge w/o odor. States also feeling \"a pinching feeling in the vaginal area\" and painful IC since last Tuesday. States yesterday had IC and it was \"extremely painful\" States is concerned of other things going on and since will be going out of town. Pt accepted appt with LUNA at 1:50pm. Pt is very appreciative and states understanding.

## 2021-03-03 NOTE — TELEPHONE ENCOUNTER
Patient requesting to speak with nurse regarding new symptoms and discuss her results. Please call at:[phone],thanks.

## 2021-03-04 LAB
C TRACH DNA SPEC QL NAA+PROBE: NEGATIVE
N GONORRHOEA DNA SPEC QL NAA+PROBE: NEGATIVE

## 2021-03-05 ENCOUNTER — TELEPHONE (OUTPATIENT)
Dept: OBGYN CLINIC | Facility: CLINIC | Age: 31
End: 2021-03-05

## 2021-03-05 LAB
GENITAL VAGINOSIS SCREEN: NEGATIVE
T VAGINALIS RRNA SPEC QL NAA+PROBE: NEGATIVE
TRICHOMONAS SCREEN: NEGATIVE

## 2021-03-05 RX ORDER — FLUCONAZOLE 150 MG/1
TABLET ORAL
Qty: 2 TABLET | Refills: 0 | Status: SHIPPED | OUTPATIENT
Start: 2021-03-05

## 2021-03-05 NOTE — TELEPHONE ENCOUNTER
Informed pt to take Diflucan x 2 doses, 3 days apart. Informed pt that rx sent to pharmacy. Pt stated understanding.

## 2021-03-05 NOTE — TELEPHONE ENCOUNTER
Sent to MD on Call, [address] Pt saw her results and would like MD on Call, to review and give recs. NJG is out of the office today. Pt states that her urethra has yeast and it itches her.       Results from 3-3-21 for the below    Component   Ref Range & Unit

## 2021-04-07 ENCOUNTER — TELEPHONE (OUTPATIENT)
Dept: GASTROENTEROLOGY | Facility: CLINIC | Age: 31
End: 2021-04-07

## 2021-04-07 NOTE — TELEPHONE ENCOUNTER
Pharmacist states Suprep is not covered by [employer]. May substitute with Trilyte/generic equivalent if needed per Rx. Pharmacist will dispense Golytely. Patient notified and message given. Patient voiced understanding.

## 2021-04-12 ENCOUNTER — TELEPHONE (OUTPATIENT)
Dept: GASTROENTEROLOGY | Facility: CLINIC | Age: 31
End: 2021-04-12

## 2021-04-12 DIAGNOSIS — K58.9 IRRITABLE BOWEL SYNDROME, UNSPECIFIED TYPE: ICD-10-CM

## 2021-04-12 DIAGNOSIS — R10.10 UPPER ABDOMINAL PAIN: Primary | ICD-10-CM

## 2021-04-12 DIAGNOSIS — R93.89 ABNORMAL CT SCAN: ICD-10-CM

## 2021-04-12 NOTE — TELEPHONE ENCOUNTER
Pt calling to reschedule CLN 4/27/21 due [place].  Please call [phone]

## 2021-04-12 NOTE — TELEPHONE ENCOUNTER
Rescheduled for:  Colonoscopy [address]343  Provider Name:  Dr. Alyson Beyer  Date:    From-4/27/21 To-4/29/21  [facility]  Sedation:  MAC  Time:    From-2110 [phone] (pt is aware to arrive at [place] [address])   Prep:  Suprep, sent new instructions via [employer]

## 2021-04-27 ENCOUNTER — LAB ENCOUNTER (OUTPATIENT)
Dept: LAB | Age: 31
End: 2021-04-27
Attending: INTERNAL MEDICINE
Payer: MEDICAID

## 2021-04-27 DIAGNOSIS — Z01.818 PREOP TESTING: ICD-10-CM

## 2021-04-29 ENCOUNTER — ANESTHESIA (OUTPATIENT)
Dept: ENDOSCOPY | Facility: HOSPITAL | Age: 31
End: 2021-04-29
Payer: MEDICAID

## 2021-04-29 ENCOUNTER — ANESTHESIA EVENT (OUTPATIENT)
Dept: ENDOSCOPY | Facility: HOSPITAL | Age: 31
End: 2021-04-29
Payer: MEDICAID

## 2021-04-29 ENCOUNTER — HOSPITAL ENCOUNTER (OUTPATIENT)
Facility: HOSPITAL | Age: 31
Setting detail: HOSPITAL OUTPATIENT SURGERY
Discharge: HOME | End: 2021-04-29
Attending: INTERNAL MEDICINE | Admitting: INTERNAL MEDICINE
Payer: MEDICAID

## 2021-04-29 VITALS
DIASTOLIC BLOOD PRESSURE: 88 MMHG | HEIGHT: 63 IN | SYSTOLIC BLOOD PRESSURE: 116 MMHG | OXYGEN SATURATION: 100 % | TEMPERATURE: 98 F | RESPIRATION RATE: 20 BRPM | HEART RATE: 72 BPM | BODY MASS INDEX: 22.5 KG/M2 | WEIGHT: 127 LBS

## 2021-04-29 DIAGNOSIS — R93.89 ABNORMAL CT SCAN: ICD-10-CM

## 2021-04-29 DIAGNOSIS — K58.9 IRRITABLE BOWEL SYNDROME, UNSPECIFIED TYPE: ICD-10-CM

## 2021-04-29 DIAGNOSIS — Z01.818 PREOP TESTING: Primary | ICD-10-CM

## 2021-04-29 DIAGNOSIS — R10.10 UPPER ABDOMINAL PAIN: ICD-10-CM

## 2021-04-29 PROCEDURE — 45380 COLONOSCOPY AND BIOPSY: CPT | Performed by: INTERNAL MEDICINE

## 2021-04-29 PROCEDURE — 0DBN8ZX EXCISION OF SIGMOID COLON, VIA NATURAL OR ARTIFICIAL OPENING ENDOSCOPIC, DIAGNOSTIC: ICD-10-PCS | Performed by: INTERNAL MEDICINE

## 2021-04-29 RX ORDER — NALOXONE HYDROCHLORIDE 0.4 MG/ML
80 INJECTION, SOLUTION INTRAMUSCULAR; INTRAVENOUS; SUBCUTANEOUS AS NEEDED
Status: DISCONTINUED | OUTPATIENT
Start: 2021-04-29 | End: 2021-04-29

## 2021-04-29 RX ORDER — LIDOCAINE HYDROCHLORIDE 10 MG/ML
INJECTION, SOLUTION EPIDURAL; INFILTRATION; INTRACAUDAL; PERINEURAL AS NEEDED
Status: DISCONTINUED | OUTPATIENT
Start: 2021-04-29 | End: 2021-04-29 | Stop reason: SURG

## 2021-04-29 RX ORDER — SODIUM CHLORIDE, SODIUM LACTATE, POTASSIUM CHLORIDE, CALCIUM CHLORIDE 600; 310; 30; 20 MG/100ML; MG/100ML; MG/100ML; MG/100ML
INJECTION, SOLUTION INTRAVENOUS CONTINUOUS
Status: DISCONTINUED | OUTPATIENT
Start: 2021-04-29 | End: 2021-04-29

## 2021-04-29 RX ADMIN — LIDOCAINE HYDROCHLORIDE 50 MG: 10 INJECTION, SOLUTION EPIDURAL; INFILTRATION; INTRACAUDAL; PERINEURAL at 08:50:00

## 2021-04-29 NOTE — ANESTHESIA POSTPROCEDURE EVALUATION
Patient: Silke Nj    Procedure Summary     Date: 04/29/21 Room / Location: [facility] ENDOSCOPY 04 / [facility] ENDOSCOPY    Anesthesia Start: 3306 Anesthesia Stop: 0500    Procedure: COLONOSCOPY (N/A ) Diagnosis:       Upper abdominal pain      Irritable sandro

## 2021-04-29 NOTE — ANESTHESIA PREPROCEDURE EVALUATION
Anesthesia PreOp Note    HPI:     Quintin Gomes is a 35year old female who presents for preoperative consultation requested by: Michelle Tracy MD    Date of Surgery: 4/29/2021    Procedure(s):  COLONOSCOPY  Indication: Upper abdominal pain, Jey Israel Oral Tab, Take 1 tablet (500 mg total) by mouth daily. Increase to bid for 3 days if acute outbreak, Disp: 90 tablet, Rfl: 1, 4/28/2021  drospirenone-ethinyl estradiol (OLGA) 3-0.03 MG Oral Tab, Take 1 tablet by mouth daily. , Disp: 3 Package, Rfl: 3, 4/28 weekend      Drug use: No      Sexual activity: Yes        Partners: Male        Birth control/protection: Condom, OCP        Comment: same partner x 2 years    Other Topics      Concerns:         Service: Not Asked        Blood Transfusions: No 02/02/2021    K 3.6 02/02/2021     02/02/2021    CO2 22.0 02/02/2021    BUN 13 02/02/2021    CREATSERUM 0.76 02/02/2021    GLU 98 02/02/2021    CA 9.6 02/02/2021          Vital Signs: Body mass index is 22.5 kg/m².    height is 1.6 m (5' 3\") and gunjan

## 2021-04-29 NOTE — OPERATIVE REPORT
[facility] Endoscopy Report      Preoperative Diagnosis:  - abnormal CT scan- thickened TI  - question IBS /IBD      Postoperative Diagnosis:  - focal ileitis  - small internal hemorrhoids      Procedure:    Colonoscopy     Surgeon:  Juany Araya

## 2021-04-29 NOTE — H&P
History & Physical Examination    Patient Name: Vannesa Summers  MRN: [id number]  CSN: [id number]  YOB: 1990    Diagnosis:     Abnormal CT scan          busPIRone HCl 15 MG Oral Tab, Take 15 mg by mouth 2 (two) times daily. , Disp: , Rfl: , SURGERY        septoplasty, TBR   • TONSILLECTOMY       Family History   Problem Relation Age of Onset   • No Known Problems Father    • No Known Problems Mother    • No Known Problems Sister    • No Known Problems Brother      Social History    T[employer]

## 2021-05-03 ENCOUNTER — TELEPHONE (OUTPATIENT)
Dept: GASTROENTEROLOGY | Facility: CLINIC | Age: 31
End: 2021-05-03

## 2021-05-03 NOTE — TELEPHONE ENCOUNTER
----- Message from Sameer Salcedo MD sent at 5/3/2021  2:06 PM CDT -----  Colonoscopy showed mild irritation of the terminal ileum ( small intestine) - samples taken were negative for Crohns disease.      Follow up with Bethanne Hammans for further [facility] [place]

## 2021-05-03 NOTE — TELEPHONE ENCOUNTER
Patient viewed below result note with recommendation in MyChart:  Seen by patient Cristina Patel on 5/3/2021  2:38 PM CDT

## 2021-07-14 NOTE — PROGRESS NOTES
[facility] Follow-up Visit    Anastasia hematemesis, dysphagia odynophagia. No chest pain or shortness of breath. Appetite and weight are stable.             Pertinent Surgical Hx:  No abdominal surgery  - See additional surgical hx below  - No known issues with sedation.  - No known history Father    • No Known Problems Mother    • No Known Problems Sister    • No Known Problems Brother       Social History: Social History    Tobacco Use      Smoking status: Current Some Day Smoker        Packs/day: 0.50        Years: 10.00        Pack years: hematuria   SKIN:  negative for  rash  ALLERGIC/IMMUNOLOGIC:  negative for hay fever allergies  ENDOCRINE:  negative for cold intolerance and heat intolerance  MUSCULOSKELETAL:  negative for  joint stiffness and joint swelling  BEHAVIOR/PSYCH:  negative fo fiber supplements which have worsened her symptoms. We also discussed the avoidance of dairy/gluten products may be helpful. Could also consider a trial of Xifaxan if symptoms are persistent despite the above.   She would be agreeable to the above measure

## 2021-07-26 ENCOUNTER — OFFICE VISIT (OUTPATIENT)
Dept: GASTROENTEROLOGY | Facility: CLINIC | Age: 31
End: 2021-07-26
Payer: MEDICAID

## 2021-07-26 VITALS
WEIGHT: 133 LBS | HEART RATE: 59 BPM | BODY MASS INDEX: 23.57 KG/M2 | DIASTOLIC BLOOD PRESSURE: 77 MMHG | SYSTOLIC BLOOD PRESSURE: 119 MMHG | HEIGHT: 63 IN

## 2021-07-26 DIAGNOSIS — R10.10 UPPER ABDOMINAL PAIN: Primary | ICD-10-CM

## 2021-07-26 DIAGNOSIS — R19.8 IRREGULAR BOWEL HABITS: ICD-10-CM

## 2021-07-26 DIAGNOSIS — R10.13 DYSPEPSIA: ICD-10-CM

## 2021-07-26 PROCEDURE — 3078F DIAST BP <80 MM HG: CPT | Performed by: NURSE PRACTITIONER

## 2021-07-26 PROCEDURE — 3008F BODY MASS INDEX DOCD: CPT | Performed by: NURSE PRACTITIONER

## 2021-07-26 PROCEDURE — 3074F SYST BP LT 130 MM HG: CPT | Performed by: NURSE PRACTITIONER

## 2021-07-26 PROCEDURE — 99214 OFFICE O/P EST MOD 30 MIN: CPT | Performed by: NURSE PRACTITIONER

## 2021-07-26 RX ORDER — FLUTICASONE PROPIONATE 50 MCG
2 SPRAY, SUSPENSION (ML) NASAL DAILY
COMMUNITY
Start: 2021-06-09

## 2021-07-26 RX ORDER — DICYCLOMINE HYDROCHLORIDE 10 MG/1
10 CAPSULE ORAL 2 TIMES DAILY PRN
Qty: 60 CAPSULE | Refills: 1 | Status: SHIPPED | OUTPATIENT
Start: 2021-07-26

## 2021-07-26 NOTE — PATIENT INSTRUCTIONS
-FODMAP diet  -Dicyclomine as needed  -Continue omeprazole for heartburn  -Follow-up with any new or worsening symptoms      [email or web address]

## 2021-10-25 ENCOUNTER — OFFICE VISIT (OUTPATIENT)
Dept: OTOLARYNGOLOGY | Facility: CLINIC | Age: 31
End: 2021-10-25
Payer: MEDICAID

## 2021-10-25 DIAGNOSIS — J01.01 ACUTE RECURRENT MAXILLARY SINUSITIS: Primary | ICD-10-CM

## 2021-10-25 PROCEDURE — 99213 OFFICE O/P EST LOW 20 MIN: CPT | Performed by: OTOLARYNGOLOGY

## 2021-10-25 PROCEDURE — 31231 NASAL ENDOSCOPY DX: CPT | Performed by: OTOLARYNGOLOGY

## 2021-10-25 RX ORDER — PREDNISONE 20 MG/1
TABLET ORAL
Qty: 7 TABLET | Refills: 0 | Status: SHIPPED | OUTPATIENT
Start: 2021-10-25

## 2021-10-25 RX ORDER — FLUTICASONE PROPIONATE 50 MCG
2 SPRAY, SUSPENSION (ML) NASAL DAILY
Qty: 3 EACH | Refills: 4 | Status: SHIPPED | OUTPATIENT
Start: 2021-10-25

## 2021-10-25 RX ORDER — AMOXICILLIN AND CLAVULANATE POTASSIUM 875; 125 MG/1; MG/1
1 TABLET, FILM COATED ORAL 2 TIMES DAILY
Qty: 28 TABLET | Refills: 0 | Status: SHIPPED | OUTPATIENT
Start: 2021-10-25 | End: 2021-11-08

## 2021-10-25 NOTE — PROGRESS NOTES
Giulia Aldrich is a 28year old female. Patient presents with:  Nose Problem: Pt present hx of nose polyp removal surgery. Pt stts difficulty breathing of left nostril.       HISTORY OF PRESENT ILLNESS   8/12/2020  She has had a persistent left-sided p on the left side worried about recurrence of the polyp  Social History    Socioeconomic History      Marital status: Single    Tobacco Use      Smoking status: Current Some Day Smoker        Packs/day: 0.50        Years: 10.00        Pack years: 5        T SEPTUM     • SINUS SURGERY        septoplasty, TBR   • TONSILLECTOMY           REVIEW OF SYSTEMS    System Neg/Pos Details   Constitutional Negative Fatigue, fever and weight loss. ENMT Negative Drooling.    Eyes Negative Blurred vision and vision changes widely patent I do not see any recurrence of the polyp    Current Outpatient Medications:   •  predniSONE 20 MG Oral Tab, 2 po for 2d,1 po for 2 d, 1/2po for 2 d, Disp: 7 tablet, Rfl: 0  •  amoxicillin clavulanate (AUGMENTIN) 875-125 MG Oral Tab, Take 1 ta

## 2021-11-15 ENCOUNTER — TELEPHONE (OUTPATIENT)
Dept: OTOLARYNGOLOGY | Facility: CLINIC | Age: 31
End: 2021-11-15

## 2021-11-15 DIAGNOSIS — J01.01 ACUTE RECURRENT MAXILLARY SINUSITIS: Primary | ICD-10-CM

## 2021-11-15 NOTE — TELEPHONE ENCOUNTER
Patient states she was last seen 10/25/21 and would like to see if a CT of the sinus can be ordered.  Please advise

## 2021-11-18 NOTE — TELEPHONE ENCOUNTER
Informed patient of the CT scan sinus order and advised that manage care will insurance for prior auth and it will take 10 business days to process once approved ,will call patient,pt verbalized understanding.

## 2021-12-02 ENCOUNTER — HOSPITAL ENCOUNTER (OUTPATIENT)
Dept: CT IMAGING | Age: 31
Discharge: HOME | End: 2021-12-02
Attending: OTOLARYNGOLOGY
Payer: MEDICAID

## 2021-12-02 DIAGNOSIS — J01.01 ACUTE RECURRENT MAXILLARY SINUSITIS: ICD-10-CM

## 2021-12-02 PROCEDURE — 70486 CT MAXILLOFACIAL W/O DYE: CPT | Performed by: OTOLARYNGOLOGY

## 2021-12-20 ENCOUNTER — TELEPHONE (OUTPATIENT)
Dept: OTOLARYNGOLOGY | Facility: CLINIC | Age: 31
End: 2021-12-20

## 2021-12-20 NOTE — TELEPHONE ENCOUNTER
Patient requesting CT results to be faxed to [facility] Fax: [phone].  Patient states she had an appointment tomorrow at 8:30 am. Please advise

## 2022-02-23 ENCOUNTER — TELEPHONE (OUTPATIENT)
Dept: OBGYN CLINIC | Facility: CLINIC | Age: 32
End: 2022-02-23

## 2022-02-23 RX ORDER — VALACYCLOVIR HYDROCHLORIDE 500 MG/1
TABLET, FILM COATED ORAL
Qty: 90 TABLET | Refills: 0 | Status: SHIPPED | OUTPATIENT
Start: 2022-02-23

## 2022-02-23 NOTE — TELEPHONE ENCOUNTER
Pt is scheduled for next annual with LUNA on 5/6/22. Pt calling to report she has a current vaginal herpes outbreak. Pt asking for refill of Valtrex. Message to [address] if OK to fill?

## 2022-02-23 NOTE — TELEPHONE ENCOUNTER
Patient notified of recs and script to [facility] VASCULAR [facility].

## 2022-02-28 ENCOUNTER — TELEPHONE (OUTPATIENT)
Dept: OBGYN CLINIC | Facility: CLINIC | Age: 32
End: 2022-02-28

## 2022-05-06 ENCOUNTER — OFFICE VISIT (OUTPATIENT)
Dept: OBGYN CLINIC | Facility: CLINIC | Age: 32
End: 2022-05-06
Payer: MEDICAID

## 2022-05-06 VITALS
HEART RATE: 74 BPM | SYSTOLIC BLOOD PRESSURE: 107 MMHG | BODY MASS INDEX: 24.32 KG/M2 | HEIGHT: 63.5 IN | WEIGHT: 139 LBS | DIASTOLIC BLOOD PRESSURE: 73 MMHG

## 2022-05-06 DIAGNOSIS — Z01.419 ENCOUNTER FOR GYNECOLOGICAL EXAMINATION WITHOUT ABNORMAL FINDING: Primary | ICD-10-CM

## 2022-05-06 PROCEDURE — 3008F BODY MASS INDEX DOCD: CPT | Performed by: OBSTETRICS & GYNECOLOGY

## 2022-05-06 PROCEDURE — 99395 PREV VISIT EST AGE 18-39: CPT | Performed by: OBSTETRICS & GYNECOLOGY

## 2022-05-06 PROCEDURE — 3074F SYST BP LT 130 MM HG: CPT | Performed by: OBSTETRICS & GYNECOLOGY

## 2022-05-06 PROCEDURE — 3078F DIAST BP <80 MM HG: CPT | Performed by: OBSTETRICS & GYNECOLOGY

## 2022-05-09 LAB — HPV I/H RISK 1 DNA SPEC QL NAA+PROBE: NEGATIVE

## 2022-05-27 ENCOUNTER — HOSPITAL ENCOUNTER (OUTPATIENT)
Age: 32
Discharge: HOME | End: 2022-05-27
Attending: EMERGENCY MEDICINE
Payer: MEDICAID

## 2022-05-27 ENCOUNTER — APPOINTMENT (OUTPATIENT)
Dept: GENERAL RADIOLOGY | Age: 32
End: 2022-05-27
Attending: EMERGENCY MEDICINE
Payer: MEDICAID

## 2022-05-27 VITALS
SYSTOLIC BLOOD PRESSURE: 124 MMHG | OXYGEN SATURATION: 100 % | HEART RATE: 77 BPM | RESPIRATION RATE: 16 BRPM | DIASTOLIC BLOOD PRESSURE: 77 MMHG | TEMPERATURE: 98 F

## 2022-05-27 DIAGNOSIS — S93.402A MILD SPRAIN OF LEFT ANKLE, INITIAL ENCOUNTER: Primary | ICD-10-CM

## 2022-05-27 PROCEDURE — 73610 X-RAY EXAM OF ANKLE: CPT | Performed by: EMERGENCY MEDICINE

## 2022-05-27 PROCEDURE — 99213 OFFICE O/P EST LOW 20 MIN: CPT

## 2022-05-27 RX ORDER — IBUPROFEN 600 MG/1
600 TABLET ORAL EVERY 8 HOURS PRN
Qty: 30 TABLET | Refills: 0 | Status: SHIPPED | OUTPATIENT
Start: 2022-05-27 | End: 2022-06-03

## 2022-05-27 NOTE — ED INITIAL ASSESSMENT (HPI)
Twisted left ankle 9 days ago and heard \"snap\". Pain and tenderness to lateral ankle. Painful weight bearing. + distal CMS.

## 2022-06-02 ENCOUNTER — OFFICE VISIT (OUTPATIENT)
Dept: OBGYN CLINIC | Facility: CLINIC | Age: 32
End: 2022-06-02
Payer: MEDICAID

## 2022-06-02 VITALS
HEART RATE: 74 BPM | WEIGHT: 140.63 LBS | DIASTOLIC BLOOD PRESSURE: 70 MMHG | SYSTOLIC BLOOD PRESSURE: 105 MMHG | BODY MASS INDEX: 25 KG/M2

## 2022-06-02 DIAGNOSIS — N89.8 VAGINAL IRRITATION: Primary | ICD-10-CM

## 2022-06-02 DIAGNOSIS — R39.9 UTI SYMPTOMS: ICD-10-CM

## 2022-06-02 PROCEDURE — 3074F SYST BP LT 130 MM HG: CPT | Performed by: NURSE PRACTITIONER

## 2022-06-02 PROCEDURE — 99213 OFFICE O/P EST LOW 20 MIN: CPT | Performed by: NURSE PRACTITIONER

## 2022-06-02 PROCEDURE — 3078F DIAST BP <80 MM HG: CPT | Performed by: NURSE PRACTITIONER

## 2022-06-02 RX ORDER — NITROFURANTOIN 25; 75 MG/1; MG/1
100 CAPSULE ORAL 2 TIMES DAILY
Qty: 14 CAPSULE | Refills: 0 | Status: SHIPPED | OUTPATIENT
Start: 2022-06-02 | End: 2022-06-09

## 2022-06-03 ENCOUNTER — LAB ENCOUNTER (OUTPATIENT)
Dept: LAB | Age: 32
End: 2022-06-03
Attending: NURSE PRACTITIONER
Payer: MEDICAID

## 2022-06-03 DIAGNOSIS — R39.9 UTI SYMPTOMS: ICD-10-CM

## 2022-06-03 LAB
BILIRUB UR QL: NEGATIVE
CLARITY UR: CLEAR
COLOR UR: YELLOW
GLUCOSE UR-MCNC: NEGATIVE MG/DL
HGB UR QL STRIP.AUTO: NEGATIVE
KETONES UR-MCNC: NEGATIVE MG/DL
LEUKOCYTE ESTERASE UR QL STRIP.AUTO: NEGATIVE
NITRITE UR QL STRIP.AUTO: NEGATIVE
PH UR: 7 [PH] (ref 5–8)
PROT UR-MCNC: NEGATIVE MG/DL
SP GR UR STRIP: 1 (ref 1–1.03)
UROBILINOGEN UR STRIP-ACNC: <2
VIT C UR-MCNC: NEGATIVE MG/DL

## 2022-06-03 PROCEDURE — 87086 URINE CULTURE/COLONY COUNT: CPT

## 2022-06-03 PROCEDURE — 81003 URINALYSIS AUTO W/O SCOPE: CPT

## 2022-06-04 LAB
GENITAL VAGINOSIS SCREEN: NEGATIVE
TRICHOMONAS SCREEN: NEGATIVE

## 2022-06-04 NOTE — PROGRESS NOTES
Hi Viola    Your urine culture was negative for infection. You can stop the antibiotics. Also you vaginal cultures were negative for bacteria and yeast.    Please call the office with any questions or concerns.     Take care  AIME Soto

## 2022-07-08 ENCOUNTER — TELEPHONE (OUTPATIENT)
Dept: OBGYN CLINIC | Facility: CLINIC | Age: 32
End: 2022-07-08

## 2022-07-08 DIAGNOSIS — Z34.91 PREGNANCY WITH UNCERTAIN DATES IN FIRST TRIMESTER: Primary | ICD-10-CM

## 2022-07-08 NOTE — TELEPHONE ENCOUNTER
Patient calling with +HPT. LMP was 5/21/22. Cycles are irregular, 35-45 day cycles. Pt is established. Dating US placed and CS number provided. Patient agrees to policy to rotate care between all providers in the office, and understands that on-call provider will deliver her. Patient directed to start PNV with iron, DHA and folic acid. OBN appt scheduled on 7/14/22 .

## 2022-07-14 ENCOUNTER — NURSE ONLY (OUTPATIENT)
Dept: OBGYN CLINIC | Facility: CLINIC | Age: 32
End: 2022-07-14
Payer: MEDICAID

## 2022-07-14 ENCOUNTER — TELEPHONE (OUTPATIENT)
Dept: OBGYN CLINIC | Facility: CLINIC | Age: 32
End: 2022-07-14

## 2022-07-14 DIAGNOSIS — Z34.81 ENCOUNTER FOR SUPERVISION OF OTHER NORMAL PREGNANCY IN FIRST TRIMESTER: Primary | ICD-10-CM

## 2022-07-14 RX ORDER — CHOLECALCIFEROL (VITAMIN D3) 25 MCG
1 TABLET,CHEWABLE ORAL DAILY
COMMUNITY

## 2022-07-14 NOTE — PROGRESS NOTES
Pt seen for OBN PC  appt today with no complaints. Normal PN labs ordered,  Including OB ultrasound and Hep C. Pt advised all labs must be completed and resulted prior to MD appt. Pt walked to  to schedule NPN appt with MD.    Ht is 5' 3.5\"  Wt is 140lbs. Cycles every 35-45 day. OB US scheduled. Partner's name is Soheila Barroso, contact # cell [phone],  race:    He is not the father of pt's 15year old. 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 MEDICAL HISTORY    Anemia Yes With preg with son    Anesthetic complications No    Anxiety/Depression   Previously for anxiety and depression. Pt states not any more. Pt had postpartum depression also. Autoimmune Disorder  Crohn hx, but it is gone now. Pt states she has IBS and diarrhea. Asthma  No    Cancer No    Diabetes  No    Gyne/breast Surgery  Colpo & LEEP    Heart Disease No    Hepatitis/Liver Disease  No    History of blood transfusion No    History of abnormal pap Yes Colpo and LEEP   Hypertension  No    Infertility  No    Kidney Disease/Frequent UTIs  No    Medication Allergies No    Latex Allergies No    Food Allergies  No    Neurological Disorder/Epilepsy No    Operations/Hospitalizations Yes Tonsillectomy, nasal scope, three sinus surgeries, LEEP & Colpo    TB exposure  No    Thyroid Dysfunction No    Trauma/Violence  No    Uterine Anomaly  No    Uterine Fibroids  No    Variocosities/DVTs No    Smoker  Quit 7/4/22   Drug usage in prior year No    Alcohol No    Would you accept a blood transfusion? If no, are you a Judaism?  Yes    No            INFECTION HISTORY    Chlamydia Yes    Pt or partner have hx of Genital Herpes Yes Pt    Gonorrhea No    Hepatitis B No    HIV No    HPV Yes    MRSA No    Syphilis No    Tattoos Yes    Live with someone or Exposed to TB No    Rash or viral illness since LMP  No    Varicella Yes Childhood   Recent Travel (or planned travel) to [facility] for self and or partner No    Pets No        GENETICS SCREENING    Genetic Screening    Genetic Screening/Teratology Counseling- Includes patient, baby's father, or anyone in either family with:  Patient's age 28 years or older as of estimated date of delivery: No   Thalassemia ([facility], [facility], [address], or  background): MCV less than 80: No (Comment: [facility]  )   Neural tube defect (Meningomyelocele, Spina bifida, or Anencephaly): No   Congenital heart defect: No   Down syndrome: No   Stefan-Sachs (Ashkenazi Tenriism, [place], [place]): No   Canavan disease (Ashkenazi Tenriism): No   Familial dysautonomia (Ashkenazi Tenriism): No   Sickle cell disease or trait (): No   Hemophilia or other blood disorders: No   Muscular dystrophy: No    Cystic fibrosis: No   Buzz's chorea: No   Intellectual disability and/or autism: No   Other inherited genetic or chromosomal disorder: No   Maternal metabolic disorder (eg. Type 1 diabetes, PKU): No   Patient or baby's father had child with birth defects not listed above: No   Recurrent pregnancy loss, or a stillbirth: No   Medications (including supplements, vitamins, herbs, or OTC drugs)/illicit/recreational drugs/alcohol since last menstrual period: Yes   If yes, agent(s) and strength/dosage: PNV with DHA                MISC    Infant vaccinations  Yes      Pt. Has answered NO 5P questions and has NO  risk factors. Pt. Given What pregnant women need to know handout.

## 2022-07-14 NOTE — TELEPHONE ENCOUNTER
Informed pt that LUNA stated to keep her ob ultrasound appt and the next day is her New OB MD appt. Pt stated understanding.

## 2022-07-14 NOTE — TELEPHONE ENCOUNTER
Pt had her obn pc appt today. Pt's lmp 5-21 for sure date. Cycles every 35-45 days. Pt has her OB ultrasound scheduled on 7/28. Woodrow Zavaleta MD appt 7/29. Pt states that ultrasound department could not get her in any sooner then 7/28. Sent to MD on Call, [facility], okay to keep that appt on 7/28 for ob ultrasound or do you want it sooner?

## 2022-07-27 ENCOUNTER — LAB ENCOUNTER (OUTPATIENT)
Dept: LAB | Age: 32
End: 2022-07-27
Attending: INTERNAL MEDICINE
Payer: MEDICAID

## 2022-07-27 DIAGNOSIS — Z34.81 ENCOUNTER FOR SUPERVISION OF OTHER NORMAL PREGNANCY IN FIRST TRIMESTER: ICD-10-CM

## 2022-07-27 LAB
ANTIBODY SCREEN: NEGATIVE
BASOPHILS # BLD AUTO: 0.02 X10(3) UL (ref 0–0.2)
BASOPHILS NFR BLD AUTO: 0.2 %
DEPRECATED RDW RBC AUTO: 39.8 FL (ref 35.1–46.3)
EOSINOPHIL # BLD AUTO: 0.07 X10(3) UL (ref 0–0.7)
EOSINOPHIL NFR BLD AUTO: 0.9 %
ERYTHROCYTE [DISTWIDTH] IN BLOOD BY AUTOMATED COUNT: 11.7 % (ref 11–15)
HBV SURFACE AG SER-ACNC: <0.1 [IU]/L
HBV SURFACE AG SERPL QL IA: NONREACTIVE
HCT VFR BLD AUTO: 40.5 %
HCV AB SERPL QL IA: NONREACTIVE
HGB BLD-MCNC: 14 G/DL
IMM GRANULOCYTES # BLD AUTO: 0.03 X10(3) UL (ref 0–1)
IMM GRANULOCYTES NFR BLD: 0.4 %
LYMPHOCYTES # BLD AUTO: 1.84 X10(3) UL (ref 1–4)
LYMPHOCYTES NFR BLD AUTO: 22.5 %
MCH RBC QN AUTO: 31.7 PG (ref 26–34)
MCHC RBC AUTO-ENTMCNC: 34.6 G/DL (ref 31–37)
MCV RBC AUTO: 91.6 FL
MONOCYTES # BLD AUTO: 0.42 X10(3) UL (ref 0.1–1)
MONOCYTES NFR BLD AUTO: 5.1 %
NEUTROPHILS # BLD AUTO: 5.79 X10 (3) UL (ref 1.5–7.7)
NEUTROPHILS # BLD AUTO: 5.79 X10(3) UL (ref 1.5–7.7)
NEUTROPHILS NFR BLD AUTO: 70.9 %
PLATELET # BLD AUTO: 221 10(3)UL (ref 150–450)
RBC # BLD AUTO: 4.42 X10(6)UL
RH BLOOD TYPE: POSITIVE
RUBV IGG SER QL: POSITIVE
RUBV IGG SER-ACNC: 90.3 IU/ML (ref 10–?)
WBC # BLD AUTO: 8.2 X10(3) UL (ref 4–11)

## 2022-07-27 PROCEDURE — 86900 BLOOD TYPING SEROLOGIC ABO: CPT

## 2022-07-27 PROCEDURE — 87340 HEPATITIS B SURFACE AG IA: CPT

## 2022-07-27 PROCEDURE — 87086 URINE CULTURE/COLONY COUNT: CPT

## 2022-07-27 PROCEDURE — 86850 RBC ANTIBODY SCREEN: CPT

## 2022-07-27 PROCEDURE — 86780 TREPONEMA PALLIDUM: CPT

## 2022-07-27 PROCEDURE — 87389 HIV-1 AG W/HIV-1&-2 AB AG IA: CPT

## 2022-07-27 PROCEDURE — 86762 RUBELLA ANTIBODY: CPT

## 2022-07-27 PROCEDURE — 85025 COMPLETE CBC W/AUTO DIFF WBC: CPT

## 2022-07-27 PROCEDURE — 36415 COLL VENOUS BLD VENIPUNCTURE: CPT

## 2022-07-27 PROCEDURE — 86803 HEPATITIS C AB TEST: CPT

## 2022-07-27 PROCEDURE — 86901 BLOOD TYPING SEROLOGIC RH(D): CPT

## 2022-07-28 ENCOUNTER — HOSPITAL ENCOUNTER (OUTPATIENT)
Dept: ULTRASOUND IMAGING | Age: 32
Discharge: HOME | End: 2022-07-28
Attending: OBSTETRICS & GYNECOLOGY
Payer: MEDICAID

## 2022-07-28 DIAGNOSIS — Z34.91 PREGNANCY WITH UNCERTAIN DATES IN FIRST TRIMESTER: ICD-10-CM

## 2022-07-28 PROCEDURE — 76801 OB US < 14 WKS SINGLE FETUS: CPT | Performed by: OBSTETRICS & GYNECOLOGY

## 2022-07-28 PROCEDURE — 76817 TRANSVAGINAL US OBSTETRIC: CPT | Performed by: OBSTETRICS & GYNECOLOGY

## 2022-07-29 ENCOUNTER — TELEPHONE (OUTPATIENT)
Dept: OBGYN CLINIC | Facility: CLINIC | Age: 32
End: 2022-07-29

## 2022-07-29 ENCOUNTER — INITIAL PRENATAL (OUTPATIENT)
Dept: OBGYN CLINIC | Facility: CLINIC | Age: 32
End: 2022-07-29
Payer: MEDICAID

## 2022-07-29 VITALS
WEIGHT: 139.81 LBS | SYSTOLIC BLOOD PRESSURE: 102 MMHG | BODY MASS INDEX: 24.77 KG/M2 | HEART RATE: 75 BPM | DIASTOLIC BLOOD PRESSURE: 69 MMHG | HEIGHT: 63 IN

## 2022-07-29 DIAGNOSIS — K50.919 CROHN'S DISEASE WITH COMPLICATION, UNSPECIFIED GASTROINTESTINAL TRACT LOCATION (HCC): ICD-10-CM

## 2022-07-29 DIAGNOSIS — Z87.19 HISTORY OF CROHN'S DISEASE: ICD-10-CM

## 2022-07-29 DIAGNOSIS — O34.41 HISTORY OF LOOP ELECTROSURGICAL EXCISION PROCEDURE (LEEP) OF CERVIX AFFECTING PREGNANCY IN FIRST TRIMESTER: ICD-10-CM

## 2022-07-29 DIAGNOSIS — O34.41 HISTORY OF LEEP (LOOP ELECTROSURGICAL EXCISION PROCEDURE) OF CERVIX COMPLICATING PREGNANCY IN FIRST TRIMESTER: Primary | ICD-10-CM

## 2022-07-29 DIAGNOSIS — Z98.890 HISTORY OF LEEP (LOOP ELECTROSURGICAL EXCISION PROCEDURE) OF CERVIX COMPLICATING PREGNANCY IN FIRST TRIMESTER: Primary | ICD-10-CM

## 2022-07-29 DIAGNOSIS — Z3A.01 7 WEEKS GESTATION OF PREGNANCY: ICD-10-CM

## 2022-07-29 DIAGNOSIS — Z34.91 ENCOUNTER FOR SUPERVISION OF NORMAL PREGNANCY IN FIRST TRIMESTER, UNSPECIFIED GRAVIDITY: Primary | ICD-10-CM

## 2022-07-29 DIAGNOSIS — Z98.890 HISTORY OF LOOP ELECTROSURGICAL EXCISION PROCEDURE (LEEP) OF CERVIX AFFECTING PREGNANCY IN FIRST TRIMESTER: ICD-10-CM

## 2022-07-29 DIAGNOSIS — K50.90 CROHN'S DISEASE WITHOUT COMPLICATION, UNSPECIFIED GASTROINTESTINAL TRACT LOCATION (HCC): ICD-10-CM

## 2022-07-29 DIAGNOSIS — A60.09 HERPES GENITALIS IN WOMEN: ICD-10-CM

## 2022-07-29 LAB
APPEARANCE: CLEAR
BILIRUBIN: NEGATIVE
GLUCOSE (URINE DIPSTICK): NEGATIVE MG/DL
KETONES (URINE DIPSTICK): NEGATIVE MG/DL
LEUKOCYTES: NEGATIVE
MULTISTIX LOT#: NORMAL NUMERIC
NITRITE, URINE: NEGATIVE
OCCULT BLOOD: NEGATIVE
PH, URINE: 7 (ref 4.5–8)
PROTEIN (URINE DIPSTICK): NEGATIVE MG/DL
SPECIFIC GRAVITY: 1.02 (ref 1–1.03)
T PALLIDUM AB SER QL: NEGATIVE
URINE-COLOR: YELLOW
UROBILINOGEN,SEMI-QN: 0.2 MG/DL (ref 0–1.9)

## 2022-07-29 PROCEDURE — 87591 N.GONORRHOEAE DNA AMP PROB: CPT | Performed by: NURSE PRACTITIONER

## 2022-07-29 PROCEDURE — 87491 CHLMYD TRACH DNA AMP PROBE: CPT | Performed by: NURSE PRACTITIONER

## 2022-07-29 PROCEDURE — 87661 TRICHOMONAS VAGINALIS AMPLIF: CPT | Performed by: NURSE PRACTITIONER

## 2022-07-29 RX ORDER — PYRIDOXINE HCL (VITAMIN B6) 25 MG
25 TABLET ORAL 3 TIMES DAILY
Qty: 90 TABLET | Refills: 1 | Status: SHIPPED | OUTPATIENT
Start: 2022-07-29

## 2022-07-29 RX ORDER — DOXYLAMINE SUCCINATE 25 MG/1
12.5 TABLET ORAL NIGHTLY PRN
Qty: 30 TABLET | Refills: 1 | Status: SHIPPED | OUTPATIENT
Start: 2022-07-29

## 2022-07-29 NOTE — TELEPHONE ENCOUNTER
----- Message from AIME Goodman sent at 7/29/2022  8:48 AM CDT -----  Regarding: mfm consult    Desires genetic testing with MFM & consult due to hx of leep x 3 &Hx crohn's    Thanks  AIME Goodman

## 2022-07-29 NOTE — PROGRESS NOTES
Here for new OB. rev'd lab work, syphillis pending. Last pap 5/2022 NILM, neg HPV. Hx of leep in 2017 for BESSY 3, neg margins. Patient reports that was her third leep. Will send for MFM consult and she also desires genetics  Gc/ct today  US yesterday shows SLIUP +FCA, 7w3d - will date by this US due to irregular cycles. Final EDC 3/13/2023  Nauseated, infrequent vomiting. Will send in unisom and B6  No pelvic pain or bleeding  Hx of HSV - will need valtrex at 36 weeks  Hx anxiety/depression, PPD with last pregnancy 13 years ago. Moods today good, CTM.     sab precautions reviewed  rto 4 weeks    AIME Galaviz

## 2022-08-01 LAB
C TRACH DNA SPEC QL NAA+PROBE: NEGATIVE
N GONORRHOEA DNA SPEC QL NAA+PROBE: NEGATIVE
T VAGINALIS RRNA SPEC QL NAA+PROBE: NEGATIVE

## 2022-08-31 ENCOUNTER — HOSPITAL ENCOUNTER (OUTPATIENT)
Dept: PERINATAL CARE | Facility: HOSPITAL | Age: 32
Discharge: HOME | End: 2022-08-31
Attending: OBSTETRICS & GYNECOLOGY
Payer: MEDICAID

## 2022-08-31 ENCOUNTER — ROUTINE PRENATAL (OUTPATIENT)
Dept: OBGYN CLINIC | Facility: CLINIC | Age: 32
End: 2022-08-31
Payer: MEDICAID

## 2022-08-31 ENCOUNTER — TELEPHONE (OUTPATIENT)
Dept: PERINATAL CARE | Facility: HOSPITAL | Age: 32
End: 2022-08-31

## 2022-08-31 ENCOUNTER — HOSPITAL ENCOUNTER (OUTPATIENT)
Dept: PERINATAL CARE | Facility: HOSPITAL | Age: 32
Discharge: HOME | End: 2022-08-31
Attending: NURSE PRACTITIONER
Payer: MEDICAID

## 2022-08-31 ENCOUNTER — TELEPHONE (OUTPATIENT)
Dept: OBGYN CLINIC | Facility: CLINIC | Age: 32
End: 2022-08-31

## 2022-08-31 VITALS
BODY MASS INDEX: 25 KG/M2 | HEART RATE: 67 BPM | DIASTOLIC BLOOD PRESSURE: 69 MMHG | SYSTOLIC BLOOD PRESSURE: 105 MMHG | WEIGHT: 140.81 LBS

## 2022-08-31 VITALS
HEART RATE: 78 BPM | WEIGHT: 140 LBS | BODY MASS INDEX: 25 KG/M2 | SYSTOLIC BLOOD PRESSURE: 134 MMHG | DIASTOLIC BLOOD PRESSURE: 77 MMHG

## 2022-08-31 DIAGNOSIS — Z36.9 FIRST TRIMESTER SCREENING: ICD-10-CM

## 2022-08-31 DIAGNOSIS — O34.41 HISTORY OF LEEP (LOOP ELECTROSURGICAL EXCISION PROCEDURE) OF CERVIX COMPLICATING PREGNANCY IN FIRST TRIMESTER: ICD-10-CM

## 2022-08-31 DIAGNOSIS — Z36.9 FIRST TRIMESTER SCREENING: Primary | ICD-10-CM

## 2022-08-31 DIAGNOSIS — Z98.890 HISTORY OF LEEP (LOOP ELECTROSURGICAL EXCISION PROCEDURE) OF CERVIX COMPLICATING PREGNANCY IN FIRST TRIMESTER: ICD-10-CM

## 2022-08-31 DIAGNOSIS — Z34.80 ENCOUNTER FOR SUPERVISION OF OTHER NORMAL PREGNANCY, UNSPECIFIED TRIMESTER: Primary | ICD-10-CM

## 2022-08-31 DIAGNOSIS — K50.919 CROHN'S DISEASE WITH COMPLICATION, UNSPECIFIED GASTROINTESTINAL TRACT LOCATION (HCC): ICD-10-CM

## 2022-08-31 PROBLEM — Z13.79 GENETIC SCREENING: Status: ACTIVE | Noted: 2022-08-31

## 2022-08-31 LAB
APPEARANCE: CLEAR
BILIRUBIN: NEGATIVE
GLUCOSE (URINE DIPSTICK): NEGATIVE MG/DL
KETONES (URINE DIPSTICK): NEGATIVE MG/DL
LEUKOCYTES: NEGATIVE
MULTISTIX EXPIRATION DATE: 9 9 22 DATE
MULTISTIX LOT#: NORMAL NUMERIC
NITRITE, URINE: NEGATIVE
OCCULT BLOOD: NEGATIVE
PH, URINE: 6 (ref 4.5–8)
PROTEIN (URINE DIPSTICK): NEGATIVE MG/DL
SPECIFIC GRAVITY: 1.02 (ref 1–1.03)
URINE-COLOR: YELLOW
UROBILINOGEN,SEMI-QN: 0.2 MG/DL (ref 0–1.9)

## 2022-08-31 PROCEDURE — 81002 URINALYSIS NONAUTO W/O SCOPE: CPT | Performed by: OBSTETRICS & GYNECOLOGY

## 2022-08-31 PROCEDURE — 76813 OB US NUCHAL MEAS 1 GEST: CPT | Performed by: OBSTETRICS & GYNECOLOGY

## 2022-08-31 PROCEDURE — 3074F SYST BP LT 130 MM HG: CPT | Performed by: OBSTETRICS & GYNECOLOGY

## 2022-08-31 PROCEDURE — 3078F DIAST BP <80 MM HG: CPT | Performed by: OBSTETRICS & GYNECOLOGY

## 2022-08-31 PROCEDURE — 0502F SUBSEQUENT PRENATAL CARE: CPT | Performed by: OBSTETRICS & GYNECOLOGY

## 2022-08-31 PROCEDURE — 36415 COLL VENOUS BLD VENIPUNCTURE: CPT

## 2022-08-31 RX ORDER — ONDANSETRON 4 MG/1
4 TABLET, FILM COATED ORAL EVERY 8 HOURS PRN
Qty: 30 TABLET | Refills: 2 | Status: SHIPPED | OUTPATIENT
Start: 2022-08-31

## 2022-08-31 NOTE — TELEPHONE ENCOUNTER
----- Message from Suman Hernández sent at 8/31/2022  3:20 PM CDT -----  Regarding: MFM AUTHORIZATION  Patient is scheduled on 8-31-22  For  CONSULT/FTS   02059  DX   HX LEEP  Needs CAROL Mclean this one got missed, any chance?     Patient is scheduled on 9-26-22  For  CERVICAL LENGTH   30705  DX   HX LEEP  Needs PA      Patient is scheduled on 10-24-22  For  LEVEL 2/CERVICAL LENGTH   [id number]  DX   HX LEEP  Needs PA

## 2022-09-01 NOTE — PROGRESS NOTES
? Girl. Ishmael Delgado at visit - Gorb works. MFM consult and NIPT today. Has 16 and 20 week visits scheduled. She is having persistent nausea / emesis not responding well to Unisom. Fatigue is overwhelming. She has used Zofran with success in past with her Crohn's disease. Rx sent.

## 2022-09-07 ENCOUNTER — TELEPHONE (OUTPATIENT)
Dept: PERINATAL CARE | Facility: HOSPITAL | Age: 32
End: 2022-09-07

## 2022-09-07 NOTE — TELEPHONE ENCOUNTER
Panorama screening results obt  Reviewed by DR Emmett Schreiber    Results:  low risk  Low Risk for Aneuploidies, triploidy and Monosomy X  Fetal Sex: held for   Fetal Fraction: 10.6      Pt states understanding  Copy of results sent for scanning into pt record

## 2022-09-26 ENCOUNTER — HOSPITAL ENCOUNTER (OUTPATIENT)
Dept: PERINATAL CARE | Facility: HOSPITAL | Age: 32
End: 2022-09-26
Attending: OBSTETRICS & GYNECOLOGY

## 2022-09-26 ENCOUNTER — HOSPITAL ENCOUNTER (OUTPATIENT)
Dept: PERINATAL CARE | Facility: HOSPITAL | Age: 32
Discharge: HOME | End: 2022-09-26
Attending: OBSTETRICS & GYNECOLOGY

## 2022-09-26 VITALS
BODY MASS INDEX: 25 KG/M2 | DIASTOLIC BLOOD PRESSURE: 63 MMHG | HEART RATE: 78 BPM | WEIGHT: 142 LBS | SYSTOLIC BLOOD PRESSURE: 99 MMHG

## 2022-09-26 DIAGNOSIS — D06.9 CIN III WITH SEVERE DYSPLASIA: ICD-10-CM

## 2022-09-26 DIAGNOSIS — K50.90 CROHN'S DISEASE WITHOUT COMPLICATION, UNSPECIFIED GASTROINTESTINAL TRACT LOCATION (HCC): ICD-10-CM

## 2022-09-26 DIAGNOSIS — K50.90 MATERNAL CROHN'S DISEASE AFFECTING PREGNANCY IN SECOND TRIMESTER (HCC): ICD-10-CM

## 2022-09-26 DIAGNOSIS — Z03.75 SUSPECTED SHORTENING OF CERVIX NOT FOUND: ICD-10-CM

## 2022-09-26 DIAGNOSIS — K50.90 CROHN'S DISEASE WITHOUT COMPLICATION, UNSPECIFIED GASTROINTESTINAL TRACT LOCATION (HCC): Primary | ICD-10-CM

## 2022-09-26 DIAGNOSIS — O99.612 MATERNAL CROHN'S DISEASE AFFECTING PREGNANCY IN SECOND TRIMESTER (HCC): ICD-10-CM

## 2022-09-26 DIAGNOSIS — Z98.890 HISTORY OF LOOP ELECTROSURGICAL EXCISION PROCEDURE (LEEP) OF CERVIX AFFECTING PREGNANCY IN FIRST TRIMESTER: ICD-10-CM

## 2022-09-26 DIAGNOSIS — O34.41 HISTORY OF LOOP ELECTROSURGICAL EXCISION PROCEDURE (LEEP) OF CERVIX AFFECTING PREGNANCY IN FIRST TRIMESTER: ICD-10-CM

## 2022-09-26 PROCEDURE — 76817 TRANSVAGINAL US OBSTETRIC: CPT | Performed by: OBSTETRICS & GYNECOLOGY

## 2022-09-26 PROCEDURE — 76815 OB US LIMITED FETUS(S): CPT

## 2022-09-26 PROCEDURE — 76816 OB US FOLLOW-UP PER FETUS: CPT | Performed by: OBSTETRICS & GYNECOLOGY

## 2022-09-30 ENCOUNTER — ROUTINE PRENATAL (OUTPATIENT)
Dept: OBGYN CLINIC | Facility: CLINIC | Age: 32
End: 2022-09-30

## 2022-09-30 VITALS
WEIGHT: 142.19 LBS | SYSTOLIC BLOOD PRESSURE: 103 MMHG | HEART RATE: 76 BPM | BODY MASS INDEX: 25.2 KG/M2 | HEIGHT: 63 IN | DIASTOLIC BLOOD PRESSURE: 65 MMHG

## 2022-09-30 DIAGNOSIS — Z34.92 ENCOUNTER FOR SUPERVISION OF NORMAL PREGNANCY IN SECOND TRIMESTER, UNSPECIFIED GRAVIDITY: Primary | ICD-10-CM

## 2022-09-30 LAB
APPEARANCE: CLEAR
BILIRUBIN: NEGATIVE
GLUCOSE (URINE DIPSTICK): NEGATIVE MG/DL
KETONES (URINE DIPSTICK): NEGATIVE MG/DL
LEUKOCYTES: NEGATIVE
MULTISTIX LOT#: NORMAL NUMERIC
NITRITE, URINE: NEGATIVE
OCCULT BLOOD: NEGATIVE
PH, URINE: 6.5 (ref 4.5–8)
PROTEIN (URINE DIPSTICK): NEGATIVE MG/DL
SPECIFIC GRAVITY: 1.01 (ref 1–1.03)
URINE-COLOR: YELLOW
UROBILINOGEN,SEMI-QN: 0.2 MG/DL (ref 0–1.9)

## 2022-09-30 PROCEDURE — 81002 URINALYSIS NONAUTO W/O SCOPE: CPT | Performed by: OBSTETRICS & GYNECOLOGY

## 2022-09-30 PROCEDURE — 0502F SUBSEQUENT PRENATAL CARE: CPT | Performed by: OBSTETRICS & GYNECOLOGY

## 2022-09-30 PROCEDURE — 3074F SYST BP LT 130 MM HG: CPT | Performed by: OBSTETRICS & GYNECOLOGY

## 2022-09-30 PROCEDURE — 3008F BODY MASS INDEX DOCD: CPT | Performed by: OBSTETRICS & GYNECOLOGY

## 2022-09-30 PROCEDURE — 3078F DIAST BP <80 MM HG: CPT | Performed by: OBSTETRICS & GYNECOLOGY

## 2022-10-24 ENCOUNTER — ROUTINE PRENATAL (OUTPATIENT)
Dept: OBGYN CLINIC | Facility: CLINIC | Age: 32
End: 2022-10-24
Payer: MEDICAID

## 2022-10-24 ENCOUNTER — HOSPITAL ENCOUNTER (OUTPATIENT)
Dept: PERINATAL CARE | Facility: HOSPITAL | Age: 32
End: 2022-10-24
Attending: OBSTETRICS & GYNECOLOGY
Payer: MEDICAID

## 2022-10-24 ENCOUNTER — HOSPITAL ENCOUNTER (OUTPATIENT)
Dept: PERINATAL CARE | Facility: HOSPITAL | Age: 32
Discharge: HOME | End: 2022-10-24
Attending: OBSTETRICS & GYNECOLOGY
Payer: MEDICAID

## 2022-10-24 VITALS
HEART RATE: 74 BPM | BODY MASS INDEX: 26 KG/M2 | WEIGHT: 147 LBS | SYSTOLIC BLOOD PRESSURE: 108 MMHG | DIASTOLIC BLOOD PRESSURE: 62 MMHG

## 2022-10-24 VITALS
HEART RATE: 87 BPM | SYSTOLIC BLOOD PRESSURE: 108 MMHG | BODY MASS INDEX: 26 KG/M2 | DIASTOLIC BLOOD PRESSURE: 72 MMHG | WEIGHT: 148 LBS

## 2022-10-24 DIAGNOSIS — Z34.82 ENCOUNTER FOR SUPERVISION OF OTHER NORMAL PREGNANCY IN SECOND TRIMESTER: Primary | ICD-10-CM

## 2022-10-24 DIAGNOSIS — Z98.890 HISTORY OF LOOP ELECTROSURGICAL EXCISION PROCEDURE (LEEP) OF CERVIX AFFECTING PREGNANCY IN FIRST TRIMESTER: ICD-10-CM

## 2022-10-24 DIAGNOSIS — Z98.890 HISTORY OF LOOP ELECTROSURGICAL EXCISION PROCEDURE (LEEP) OF CERVIX AFFECTING PREGNANCY IN FIRST TRIMESTER: Primary | ICD-10-CM

## 2022-10-24 DIAGNOSIS — O34.41 HISTORY OF LOOP ELECTROSURGICAL EXCISION PROCEDURE (LEEP) OF CERVIX AFFECTING PREGNANCY IN FIRST TRIMESTER: ICD-10-CM

## 2022-10-24 DIAGNOSIS — O34.41 HISTORY OF LOOP ELECTROSURGICAL EXCISION PROCEDURE (LEEP) OF CERVIX AFFECTING PREGNANCY IN FIRST TRIMESTER: Primary | ICD-10-CM

## 2022-10-24 DIAGNOSIS — K50.90 CROHN'S DISEASE WITHOUT COMPLICATION, UNSPECIFIED GASTROINTESTINAL TRACT LOCATION (HCC): ICD-10-CM

## 2022-10-24 DIAGNOSIS — Z03.75 SUSPECTED SHORTENING OF CERVIX NOT FOUND: ICD-10-CM

## 2022-10-24 LAB
APPEARANCE: CLEAR
BILIRUBIN: NEGATIVE
GLUCOSE (URINE DIPSTICK): NEGATIVE MG/DL
KETONES (URINE DIPSTICK): NEGATIVE MG/DL
LEUKOCYTES: NEGATIVE
MULTISTIX LOT#: ABNORMAL NUMERIC
NITRITE, URINE: NEGATIVE
OCCULT BLOOD: NEGATIVE
PH, URINE: 5 (ref 4.5–8)
PROTEIN (URINE DIPSTICK): NEGATIVE MG/DL
SPECIFIC GRAVITY: 1 (ref 1–1.03)
URINE-COLOR: YELLOW
UROBILINOGEN,SEMI-QN: 2 MG/DL (ref 0–1.9)

## 2022-10-24 PROCEDURE — 76817 TRANSVAGINAL US OBSTETRIC: CPT

## 2022-10-24 PROCEDURE — 76811 OB US DETAILED SNGL FETUS: CPT | Performed by: OBSTETRICS & GYNECOLOGY

## 2022-11-21 ENCOUNTER — ROUTINE PRENATAL (OUTPATIENT)
Dept: OBGYN CLINIC | Facility: CLINIC | Age: 32
End: 2022-11-21
Payer: MEDICAID

## 2022-11-21 VITALS
DIASTOLIC BLOOD PRESSURE: 69 MMHG | WEIGHT: 148.38 LBS | SYSTOLIC BLOOD PRESSURE: 103 MMHG | BODY MASS INDEX: 26 KG/M2 | HEART RATE: 93 BPM

## 2022-11-21 DIAGNOSIS — Z34.92 ENCOUNTER FOR SUPERVISION OF NORMAL PREGNANCY IN SECOND TRIMESTER, UNSPECIFIED GRAVIDITY: Primary | ICD-10-CM

## 2022-11-21 LAB
BILIRUBIN: NEGATIVE
GLUCOSE (URINE DIPSTICK): NEGATIVE MG/DL
KETONES (URINE DIPSTICK): NEGATIVE MG/DL
LEUKOCYTES: NEGATIVE
MULTISTIX LOT#: NORMAL NUMERIC
NITRITE, URINE: NEGATIVE
OCCULT BLOOD: NEGATIVE
PH, URINE: 7 (ref 4.5–8)
PROTEIN (URINE DIPSTICK): NEGATIVE MG/DL
SPECIFIC GRAVITY: 1.01 (ref 1–1.03)
UROBILINOGEN,SEMI-QN: 0.2 MG/DL (ref 0–1.9)

## 2022-11-21 PROCEDURE — 3074F SYST BP LT 130 MM HG: CPT | Performed by: OBSTETRICS & GYNECOLOGY

## 2022-11-21 PROCEDURE — 3078F DIAST BP <80 MM HG: CPT | Performed by: OBSTETRICS & GYNECOLOGY

## 2022-11-21 PROCEDURE — 81002 URINALYSIS NONAUTO W/O SCOPE: CPT | Performed by: OBSTETRICS & GYNECOLOGY

## 2022-11-21 PROCEDURE — 0502F SUBSEQUENT PRENATAL CARE: CPT | Performed by: OBSTETRICS & GYNECOLOGY

## 2022-12-05 ENCOUNTER — LAB ENCOUNTER (OUTPATIENT)
Dept: LAB | Age: 32
End: 2022-12-05
Attending: OBSTETRICS & GYNECOLOGY
Payer: MEDICAID

## 2022-12-05 DIAGNOSIS — Z34.92 ENCOUNTER FOR SUPERVISION OF NORMAL PREGNANCY IN SECOND TRIMESTER, UNSPECIFIED GRAVIDITY: ICD-10-CM

## 2022-12-05 LAB
DEPRECATED RDW RBC AUTO: 39.8 FL (ref 35.1–46.3)
ERYTHROCYTE [DISTWIDTH] IN BLOOD BY AUTOMATED COUNT: 11.8 % (ref 11–15)
GLUCOSE 1H P GLC SERPL-MCNC: 99 MG/DL
HCT VFR BLD AUTO: 35.3 %
HGB BLD-MCNC: 11.7 G/DL
MCH RBC QN AUTO: 30.4 PG (ref 26–34)
MCHC RBC AUTO-ENTMCNC: 33.1 G/DL (ref 31–37)
MCV RBC AUTO: 91.7 FL
PLATELET # BLD AUTO: 175 10(3)UL (ref 150–450)
RBC # BLD AUTO: 3.85 X10(6)UL
WBC # BLD AUTO: 10.8 X10(3) UL (ref 4–11)

## 2022-12-05 PROCEDURE — 82950 GLUCOSE TEST: CPT

## 2022-12-05 PROCEDURE — 36415 COLL VENOUS BLD VENIPUNCTURE: CPT

## 2022-12-05 PROCEDURE — 85027 COMPLETE CBC AUTOMATED: CPT

## 2022-12-21 ENCOUNTER — ROUTINE PRENATAL (OUTPATIENT)
Dept: OBGYN CLINIC | Facility: CLINIC | Age: 32
End: 2022-12-21
Payer: MEDICAID

## 2022-12-21 ENCOUNTER — TELEPHONE (OUTPATIENT)
Dept: OBGYN CLINIC | Facility: CLINIC | Age: 32
End: 2022-12-21

## 2022-12-21 VITALS
SYSTOLIC BLOOD PRESSURE: 105 MMHG | DIASTOLIC BLOOD PRESSURE: 68 MMHG | WEIGHT: 154.81 LBS | BODY MASS INDEX: 27 KG/M2 | HEART RATE: 93 BPM

## 2022-12-21 DIAGNOSIS — Z98.890 HISTORY OF LEEP (LOOP ELECTROSURGICAL EXCISION PROCEDURE) OF CERVIX COMPLICATING PREGNANCY IN SECOND TRIMESTER: Primary | ICD-10-CM

## 2022-12-21 DIAGNOSIS — Z34.80 ENCOUNTER FOR SUPERVISION OF OTHER NORMAL PREGNANCY, UNSPECIFIED TRIMESTER: Primary | ICD-10-CM

## 2022-12-21 DIAGNOSIS — O34.42 HISTORY OF LEEP (LOOP ELECTROSURGICAL EXCISION PROCEDURE) OF CERVIX COMPLICATING PREGNANCY IN SECOND TRIMESTER: Primary | ICD-10-CM

## 2022-12-21 LAB
APPEARANCE: CLEAR
BILIRUBIN: NEGATIVE
GLUCOSE (URINE DIPSTICK): NEGATIVE MG/DL
KETONES (URINE DIPSTICK): NEGATIVE MG/DL
LEUKOCYTES: NEGATIVE
MULTISTIX EXPIRATION DATE: 1 7 23 DATE
MULTISTIX LOT#: NORMAL NUMERIC
NITRITE, URINE: NEGATIVE
OCCULT BLOOD: NEGATIVE
PH, URINE: 7 (ref 4.5–8)
PROTEIN (URINE DIPSTICK): NEGATIVE MG/DL
SPECIFIC GRAVITY: 1 (ref 1–1.03)
URINE-COLOR: YELLOW
UROBILINOGEN,SEMI-QN: 0.2 MG/DL (ref 0–1.9)

## 2022-12-21 PROCEDURE — 3078F DIAST BP <80 MM HG: CPT | Performed by: OBSTETRICS & GYNECOLOGY

## 2022-12-21 PROCEDURE — 81002 URINALYSIS NONAUTO W/O SCOPE: CPT | Performed by: OBSTETRICS & GYNECOLOGY

## 2022-12-21 PROCEDURE — 0502F SUBSEQUENT PRENATAL CARE: CPT | Performed by: OBSTETRICS & GYNECOLOGY

## 2022-12-21 PROCEDURE — 3074F SYST BP LT 130 MM HG: CPT | Performed by: OBSTETRICS & GYNECOLOGY

## 2022-12-28 ENCOUNTER — TELEPHONE (OUTPATIENT)
Dept: OBGYN CLINIC | Facility: CLINIC | Age: 32
End: 2022-12-28

## 2022-12-28 ENCOUNTER — TELEPHONE (OUTPATIENT)
Dept: PERINATAL CARE | Facility: HOSPITAL | Age: 32
End: 2022-12-28

## 2023-01-06 ENCOUNTER — ROUTINE PRENATAL (OUTPATIENT)
Dept: OBGYN CLINIC | Facility: CLINIC | Age: 33
End: 2023-01-06
Payer: MEDICAID

## 2023-01-06 VITALS
SYSTOLIC BLOOD PRESSURE: 107 MMHG | BODY MASS INDEX: 28 KG/M2 | DIASTOLIC BLOOD PRESSURE: 70 MMHG | HEART RATE: 81 BPM | WEIGHT: 156 LBS

## 2023-01-06 DIAGNOSIS — Z34.93 ENCOUNTER FOR SUPERVISION OF NORMAL PREGNANCY IN THIRD TRIMESTER, UNSPECIFIED GRAVIDITY: Primary | ICD-10-CM

## 2023-01-06 LAB
APPEARANCE: CLEAR
BILIRUBIN: NEGATIVE
GLUCOSE (URINE DIPSTICK): NEGATIVE MG/DL
KETONES (URINE DIPSTICK): NEGATIVE MG/DL
LEUKOCYTES: NEGATIVE
MULTISTIX LOT#: NORMAL NUMERIC
NITRITE, URINE: NEGATIVE
OCCULT BLOOD: NEGATIVE
PH, URINE: 6.5 (ref 4.5–8)
PROTEIN (URINE DIPSTICK): NEGATIVE MG/DL
SPECIFIC GRAVITY: 1 (ref 1–1.03)
URINE-COLOR: YELLOW
UROBILINOGEN,SEMI-QN: 0.2 MG/DL (ref 0–1.9)

## 2023-01-06 PROCEDURE — 3074F SYST BP LT 130 MM HG: CPT | Performed by: OBSTETRICS & GYNECOLOGY

## 2023-01-06 PROCEDURE — 0502F SUBSEQUENT PRENATAL CARE: CPT | Performed by: OBSTETRICS & GYNECOLOGY

## 2023-01-06 PROCEDURE — 81002 URINALYSIS NONAUTO W/O SCOPE: CPT | Performed by: OBSTETRICS & GYNECOLOGY

## 2023-01-06 PROCEDURE — 3078F DIAST BP <80 MM HG: CPT | Performed by: OBSTETRICS & GYNECOLOGY

## 2023-01-18 ENCOUNTER — ROUTINE PRENATAL (OUTPATIENT)
Dept: OBGYN CLINIC | Facility: CLINIC | Age: 33
End: 2023-01-18

## 2023-01-18 VITALS
WEIGHT: 159 LBS | SYSTOLIC BLOOD PRESSURE: 101 MMHG | DIASTOLIC BLOOD PRESSURE: 66 MMHG | BODY MASS INDEX: 28 KG/M2 | HEART RATE: 81 BPM

## 2023-01-18 DIAGNOSIS — Z34.91 ENCOUNTER FOR SUPERVISION OF NORMAL PREGNANCY IN FIRST TRIMESTER, UNSPECIFIED GRAVIDITY: Primary | ICD-10-CM

## 2023-01-18 PROCEDURE — 3078F DIAST BP <80 MM HG: CPT | Performed by: OBSTETRICS & GYNECOLOGY

## 2023-01-18 PROCEDURE — 81002 URINALYSIS NONAUTO W/O SCOPE: CPT | Performed by: OBSTETRICS & GYNECOLOGY

## 2023-01-18 PROCEDURE — 0502F SUBSEQUENT PRENATAL CARE: CPT | Performed by: OBSTETRICS & GYNECOLOGY

## 2023-01-18 PROCEDURE — 3074F SYST BP LT 130 MM HG: CPT | Performed by: OBSTETRICS & GYNECOLOGY

## 2023-01-19 ENCOUNTER — HOSPITAL ENCOUNTER (OUTPATIENT)
Dept: PERINATAL CARE | Facility: HOSPITAL | Age: 33
Discharge: HOME | End: 2023-01-19
Attending: OBSTETRICS & GYNECOLOGY
Payer: MEDICAID

## 2023-01-19 VITALS
SYSTOLIC BLOOD PRESSURE: 101 MMHG | DIASTOLIC BLOOD PRESSURE: 66 MMHG | BODY MASS INDEX: 28 KG/M2 | WEIGHT: 159 LBS | HEART RATE: 90 BPM

## 2023-01-19 DIAGNOSIS — K50.90 CROHN'S DISEASE WITHOUT COMPLICATION, UNSPECIFIED GASTROINTESTINAL TRACT LOCATION (HCC): ICD-10-CM

## 2023-01-19 DIAGNOSIS — O09.893 HISTORY OF PRETERM DELIVERY, CURRENTLY PREGNANT IN THIRD TRIMESTER: ICD-10-CM

## 2023-01-19 DIAGNOSIS — Z98.890 HISTORY OF LOOP ELECTROSURGICAL EXCISION PROCEDURE (LEEP) OF CERVIX AFFECTING PREGNANCY IN FIRST TRIMESTER: ICD-10-CM

## 2023-01-19 DIAGNOSIS — K50.90 CROHN'S DISEASE WITHOUT COMPLICATION, UNSPECIFIED GASTROINTESTINAL TRACT LOCATION (HCC): Primary | ICD-10-CM

## 2023-01-19 DIAGNOSIS — O34.41 HISTORY OF LOOP ELECTROSURGICAL EXCISION PROCEDURE (LEEP) OF CERVIX AFFECTING PREGNANCY IN FIRST TRIMESTER: ICD-10-CM

## 2023-01-19 DIAGNOSIS — O09.623 HIGH-RISK PREGNANCY, YOUNG MULTIGRAVIDA IN THIRD TRIMESTER: ICD-10-CM

## 2023-01-19 PROCEDURE — 76819 FETAL BIOPHYS PROFIL W/O NST: CPT

## 2023-01-19 PROCEDURE — 76816 OB US FOLLOW-UP PER FETUS: CPT | Performed by: OBSTETRICS & GYNECOLOGY

## 2023-01-24 ENCOUNTER — APPOINTMENT (OUTPATIENT)
Dept: CT IMAGING | Facility: HOSPITAL | Age: 33
End: 2023-01-24
Attending: EMERGENCY MEDICINE
Payer: MEDICAID

## 2023-01-24 ENCOUNTER — HOSPITAL ENCOUNTER (EMERGENCY)
Facility: HOSPITAL | Age: 33
Discharge: HOME | End: 2023-01-24
Attending: EMERGENCY MEDICINE
Payer: MEDICAID

## 2023-01-24 ENCOUNTER — HOSPITAL ENCOUNTER (OUTPATIENT)
Facility: HOSPITAL | Age: 33
Discharge: HOME | End: 2023-01-24
Attending: OBSTETRICS & GYNECOLOGY | Admitting: OBSTETRICS & GYNECOLOGY
Payer: MEDICAID

## 2023-01-24 ENCOUNTER — TELEPHONE (OUTPATIENT)
Dept: OBGYN CLINIC | Facility: CLINIC | Age: 33
End: 2023-01-24

## 2023-01-24 VITALS
SYSTOLIC BLOOD PRESSURE: 100 MMHG | HEART RATE: 85 BPM | TEMPERATURE: 98 F | WEIGHT: 159 LBS | RESPIRATION RATE: 20 BRPM | DIASTOLIC BLOOD PRESSURE: 73 MMHG | HEIGHT: 63 IN | OXYGEN SATURATION: 99 % | BODY MASS INDEX: 28.17 KG/M2

## 2023-01-24 VITALS — SYSTOLIC BLOOD PRESSURE: 110 MMHG | DIASTOLIC BLOOD PRESSURE: 70 MMHG | HEART RATE: 81 BPM | TEMPERATURE: 98 F

## 2023-01-24 DIAGNOSIS — R07.89 CHEST PAIN, ATYPICAL: Primary | ICD-10-CM

## 2023-01-24 LAB
ALBUMIN SERPL-MCNC: 2.8 G/DL (ref 3.4–5)
ALBUMIN/GLOB SERPL: 0.7 {RATIO} (ref 1–2)
ALP LIVER SERPL-CCNC: 108 U/L
ALT SERPL-CCNC: 12 U/L
ANION GAP SERPL CALC-SCNC: 11 MMOL/L (ref 0–18)
AST SERPL-CCNC: 7 U/L (ref 15–37)
BASOPHILS # BLD AUTO: 0.02 X10(3) UL (ref 0–0.2)
BASOPHILS NFR BLD AUTO: 0.2 %
BILIRUB SERPL-MCNC: 0.3 MG/DL (ref 0.1–2)
BILIRUB UR QL: NEGATIVE
BUN BLD-MCNC: 7 MG/DL (ref 7–18)
BUN/CREAT SERPL: 13.5 (ref 10–20)
CALCIUM BLD-MCNC: 8.7 MG/DL (ref 8.5–10.1)
CHLORIDE SERPL-SCNC: 109 MMOL/L (ref 98–112)
CLARITY UR: CLEAR
CO2 SERPL-SCNC: 20 MMOL/L (ref 21–32)
COLOR UR: YELLOW
CREAT BLD-MCNC: 0.52 MG/DL
D DIMER PPP FEU-MCNC: 0.92 UG/ML FEU (ref ?–0.5)
DEPRECATED RDW RBC AUTO: 40.8 FL (ref 35.1–46.3)
EOSINOPHIL # BLD AUTO: 0.08 X10(3) UL (ref 0–0.7)
EOSINOPHIL NFR BLD AUTO: 0.9 %
ERYTHROCYTE [DISTWIDTH] IN BLOOD BY AUTOMATED COUNT: 13 % (ref 11–15)
GFR SERPLBLD BASED ON 1.73 SQ M-ARVRAT: 127 ML/MIN/1.73M2 (ref 60–?)
GLOBULIN PLAS-MCNC: 3.8 G/DL (ref 2.8–4.4)
GLUCOSE BLD-MCNC: 104 MG/DL (ref 70–99)
GLUCOSE UR-MCNC: NEGATIVE MG/DL
HCT VFR BLD AUTO: 35.3 %
HGB BLD-MCNC: 11.3 G/DL
HGB UR QL STRIP.AUTO: NEGATIVE
IMM GRANULOCYTES # BLD AUTO: 0.04 X10(3) UL (ref 0–1)
IMM GRANULOCYTES NFR BLD: 0.4 %
KETONES UR-MCNC: 40 MG/DL
LEUKOCYTE ESTERASE UR QL STRIP.AUTO: NEGATIVE
LYMPHOCYTES # BLD AUTO: 1.74 X10(3) UL (ref 1–4)
LYMPHOCYTES NFR BLD AUTO: 18.9 %
MCH RBC QN AUTO: 27.8 PG (ref 26–34)
MCHC RBC AUTO-ENTMCNC: 32 G/DL (ref 31–37)
MCV RBC AUTO: 86.9 FL
MONOCYTES # BLD AUTO: 0.34 X10(3) UL (ref 0.1–1)
MONOCYTES NFR BLD AUTO: 3.7 %
NEUTROPHILS # BLD AUTO: 7.01 X10 (3) UL (ref 1.5–7.7)
NEUTROPHILS # BLD AUTO: 7.01 X10(3) UL (ref 1.5–7.7)
NEUTROPHILS NFR BLD AUTO: 75.9 %
NITRITE UR QL STRIP.AUTO: NEGATIVE
OSMOLALITY SERPL CALC.SUM OF ELEC: 288 MOSM/KG (ref 275–295)
PH UR: 6 [PH] (ref 5–8)
PLATELET # BLD AUTO: 185 10(3)UL (ref 150–450)
POTASSIUM SERPL-SCNC: 3.7 MMOL/L (ref 3.5–5.1)
PROT SERPL-MCNC: 6.6 G/DL (ref 6.4–8.2)
PROT UR-MCNC: NEGATIVE MG/DL
RBC # BLD AUTO: 4.06 X10(6)UL
SODIUM SERPL-SCNC: 140 MMOL/L (ref 136–145)
SP GR UR STRIP: <=1.005 (ref 1–1.03)
TROPONIN I HIGH SENSITIVITY: 5 NG/L
UROBILINOGEN UR STRIP-ACNC: 0.2
WBC # BLD AUTO: 9.2 X10(3) UL (ref 4–11)

## 2023-01-24 PROCEDURE — 81003 URINALYSIS AUTO W/O SCOPE: CPT | Performed by: OBSTETRICS & GYNECOLOGY

## 2023-01-24 PROCEDURE — 93010 ELECTROCARDIOGRAM REPORT: CPT

## 2023-01-24 PROCEDURE — 85025 COMPLETE CBC W/AUTO DIFF WBC: CPT | Performed by: EMERGENCY MEDICINE

## 2023-01-24 PROCEDURE — 71260 CT THORAX DX C+: CPT | Performed by: EMERGENCY MEDICINE

## 2023-01-24 PROCEDURE — 84484 ASSAY OF TROPONIN QUANT: CPT | Performed by: EMERGENCY MEDICINE

## 2023-01-24 PROCEDURE — 84484 ASSAY OF TROPONIN QUANT: CPT

## 2023-01-24 PROCEDURE — 85379 FIBRIN DEGRADATION QUANT: CPT | Performed by: EMERGENCY MEDICINE

## 2023-01-24 PROCEDURE — 80053 COMPREHEN METABOLIC PANEL: CPT | Performed by: EMERGENCY MEDICINE

## 2023-01-24 PROCEDURE — 93005 ELECTROCARDIOGRAM TRACING: CPT

## 2023-01-24 PROCEDURE — 82731 ASSAY OF FETAL FIBRONECTIN: CPT | Performed by: OBSTETRICS & GYNECOLOGY

## 2023-01-24 PROCEDURE — 99284 EMERGENCY DEPT VISIT MOD MDM: CPT

## 2023-01-24 PROCEDURE — 85025 COMPLETE CBC W/AUTO DIFF WBC: CPT

## 2023-01-24 PROCEDURE — 80053 COMPREHEN METABOLIC PANEL: CPT

## 2023-01-24 PROCEDURE — 99285 EMERGENCY DEPT VISIT HI MDM: CPT

## 2023-01-24 PROCEDURE — 36415 COLL VENOUS BLD VENIPUNCTURE: CPT

## 2023-01-24 RX ORDER — SODIUM CHLORIDE, SODIUM LACTATE, POTASSIUM CHLORIDE, CALCIUM CHLORIDE 600; 310; 30; 20 MG/100ML; MG/100ML; MG/100ML; MG/100ML
INJECTION, SOLUTION INTRAVENOUS CONTINUOUS
Status: DISCONTINUED | OUTPATIENT
Start: 2023-01-24 | End: 2023-01-24

## 2023-01-24 NOTE — ED QUICK NOTES
Patient provided with discharge instructions. Verbalized understanding for plan of care at home and follow up. All questions/ concerns addressed prior to discharge.  Staff transporting pt to [facility]

## 2023-01-24 NOTE — TELEPHONE ENCOUNTER
33w1d calling to report left sided chest pain that shoots to the armpit. Pain started 45 minutes ago. Pain is worse with deep breath. She reports SOB, states might just be hyperventilating due to anxiety. I advised patient she needs to be seen in ER regardless to work up shooting chest pain. Her  is going to take her now. I advise if severe pain, unable to take a full breath or fainting, should call ambulance. She states she feels okay for  to drive her now. To RN for ER follow-up.

## 2023-01-24 NOTE — ED INITIAL ASSESSMENT (HPI)
Pt present with c/o with L chest pain radiating to L armpit. Pt c/o of SOB with pain this morning. Pt is 33 wks pregnant.

## 2023-01-25 LAB
ATRIAL RATE: 75 BPM
P AXIS: 16 DEGREES
P-R INTERVAL: 124 MS
Q-T INTERVAL: 384 MS
QRS DURATION: 80 MS
QTC CALCULATION (BEZET): 428 MS
R AXIS: 43 DEGREES
T AXIS: 24 DEGREES
VENTRICULAR RATE: 75 BPM

## 2023-01-26 ENCOUNTER — ROUTINE PRENATAL (OUTPATIENT)
Dept: OBGYN CLINIC | Facility: CLINIC | Age: 33
End: 2023-01-26

## 2023-01-26 VITALS
SYSTOLIC BLOOD PRESSURE: 103 MMHG | BODY MASS INDEX: 29 KG/M2 | WEIGHT: 162 LBS | DIASTOLIC BLOOD PRESSURE: 71 MMHG | HEART RATE: 92 BPM

## 2023-01-26 DIAGNOSIS — Z34.93 ENCOUNTER FOR SUPERVISION OF NORMAL PREGNANCY IN THIRD TRIMESTER, UNSPECIFIED GRAVIDITY: Primary | ICD-10-CM

## 2023-01-26 PROBLEM — R06.02 SHORTNESS OF BREATH DUE TO PREGNANCY IN THIRD TRIMESTER (HCC): Status: ACTIVE | Noted: 2023-01-26

## 2023-01-26 PROBLEM — O26.893 SHORTNESS OF BREATH DUE TO PREGNANCY IN THIRD TRIMESTER: Status: ACTIVE | Noted: 2023-01-26

## 2023-01-26 PROBLEM — O26.893 SHORTNESS OF BREATH DUE TO PREGNANCY IN THIRD TRIMESTER (HCC): Status: ACTIVE | Noted: 2023-01-26

## 2023-01-26 PROBLEM — R06.02 SHORTNESS OF BREATH DUE TO PREGNANCY IN THIRD TRIMESTER: Status: ACTIVE | Noted: 2023-01-26

## 2023-01-26 LAB
APPEARANCE: CLEAR
BILIRUBIN: NEGATIVE
GLUCOSE (URINE DIPSTICK): NEGATIVE MG/DL
KETONES (URINE DIPSTICK): NEGATIVE MG/DL
LEUKOCYTES: NEGATIVE
MULTISTIX LOT#: NORMAL NUMERIC
NITRITE, URINE: NEGATIVE
OCCULT BLOOD: NEGATIVE
PH, URINE: 7.5 (ref 4.5–8)
PROTEIN (URINE DIPSTICK): NEGATIVE MG/DL
SPECIFIC GRAVITY: 1 (ref 1–1.03)
URINE-COLOR: YELLOW
UROBILINOGEN,SEMI-QN: 0.2 MG/DL (ref 0–1.9)

## 2023-01-26 PROCEDURE — 3074F SYST BP LT 130 MM HG: CPT | Performed by: OBSTETRICS & GYNECOLOGY

## 2023-01-26 PROCEDURE — 0502F SUBSEQUENT PRENATAL CARE: CPT | Performed by: OBSTETRICS & GYNECOLOGY

## 2023-01-26 PROCEDURE — 81002 URINALYSIS NONAUTO W/O SCOPE: CPT | Performed by: OBSTETRICS & GYNECOLOGY

## 2023-01-26 PROCEDURE — 3078F DIAST BP <80 MM HG: CPT | Performed by: OBSTETRICS & GYNECOLOGY

## 2023-02-05 NOTE — TELEPHONE ENCOUNTER
----- Message from Randy Liu sent at 12/28/2022 10:36 AM CST -----  Regarding: MFM AUTHORIZATION  Patient is scheduled on 1-19-23  For  GROWTH/BPP   [id number]  DX   HX LEEP  Needs PA
Authorization Number: [id number]  Case Number: [id number]     Patient Name: Jean-Pierre Lezama  : 1990  Status: Approved  P2P Status:   Approval Date: 2022 12:00:00 AM  Service Code: 91008  Service Description: Ob us, follow-up, per fetus  Site Name: [address] [place] [address]  Start Date:   Expiration Date: 2023  Date Last Updated: 2022 5:17:52 PM  Correspondence:    Procedures  Procedure Description Qty Requested Qty Approved Modifier(s)  18798  Ultrasound, a special kind of picture of your baby 1 1   83751  testing of your baby 1 1
Your case has been sent to Medical Review. Provider Name: DR. Erin Doherty Contact: lexx  Provider Address: [address] [facility] Phone Number: [phone]   Fax Number: [phone]  Patient Name: Kaz Duvall Patient Id: [id number]  Insurance Carrier: [facility]  Site Name: [address] [place] [address] Site ID: [id number]  Site Address: [address] [place] [address]      Primary Diagnosis Code: O34.41 Description: Maternal care for other abnormalities of cervix, first trimester  Secondary Diagnosis Code:  Description:   Date of Service: Not provided    CPT Code: 65331 Description: Ob us, follow-up, per fetus  Case Number: [id number]  Review Date: 12/28/2022 2:33:13 PM  Expiration Date: N/A  Status: Your case has been sent to Medical Review.
clear

## 2023-02-14 ENCOUNTER — LAB ENCOUNTER (OUTPATIENT)
Dept: LAB | Age: 33
End: 2023-02-14
Attending: OBSTETRICS & GYNECOLOGY
Payer: MEDICAID

## 2023-02-14 DIAGNOSIS — Z34.93 ENCOUNTER FOR SUPERVISION OF NORMAL PREGNANCY IN THIRD TRIMESTER, UNSPECIFIED GRAVIDITY: ICD-10-CM

## 2023-02-14 LAB
DEPRECATED RDW RBC AUTO: 43.2 FL (ref 35.1–46.3)
ERYTHROCYTE [DISTWIDTH] IN BLOOD BY AUTOMATED COUNT: 13.9 % (ref 11–15)
HCT VFR BLD AUTO: 33.8 %
HGB BLD-MCNC: 10.7 G/DL
MCH RBC QN AUTO: 27.2 PG (ref 26–34)
MCHC RBC AUTO-ENTMCNC: 31.7 G/DL (ref 31–37)
MCV RBC AUTO: 85.8 FL
PLATELET # BLD AUTO: 191 10(3)UL (ref 150–450)
RBC # BLD AUTO: 3.94 X10(6)UL
WBC # BLD AUTO: 10.7 X10(3) UL (ref 4–11)

## 2023-02-14 PROCEDURE — 87389 HIV-1 AG W/HIV-1&-2 AB AG IA: CPT

## 2023-02-14 PROCEDURE — 85027 COMPLETE CBC AUTOMATED: CPT

## 2023-02-14 PROCEDURE — 36415 COLL VENOUS BLD VENIPUNCTURE: CPT

## 2023-02-14 PROCEDURE — 86780 TREPONEMA PALLIDUM: CPT

## 2023-02-15 ENCOUNTER — ROUTINE PRENATAL (OUTPATIENT)
Dept: OBGYN CLINIC | Facility: CLINIC | Age: 33
End: 2023-02-15

## 2023-02-15 VITALS
SYSTOLIC BLOOD PRESSURE: 110 MMHG | DIASTOLIC BLOOD PRESSURE: 73 MMHG | BODY MASS INDEX: 29 KG/M2 | WEIGHT: 164.38 LBS | HEART RATE: 87 BPM

## 2023-02-15 DIAGNOSIS — Z34.93 ENCOUNTER FOR SUPERVISION OF NORMAL PREGNANCY IN THIRD TRIMESTER, UNSPECIFIED GRAVIDITY: Primary | ICD-10-CM

## 2023-02-15 LAB
BILIRUBIN: NEGATIVE
GLUCOSE (URINE DIPSTICK): NEGATIVE MG/DL
KETONES (URINE DIPSTICK): NEGATIVE MG/DL
MULTISTIX LOT#: ABNORMAL NUMERIC
NITRITE, URINE: NEGATIVE
OCCULT BLOOD: NEGATIVE
PH, URINE: 7.5 (ref 4.5–8)
SPECIFIC GRAVITY: 1.01 (ref 1–1.03)
T PALLIDUM AB SER QL: NEGATIVE
UROBILINOGEN,SEMI-QN: 0.2 MG/DL (ref 0–1.9)

## 2023-02-15 PROCEDURE — 0502F SUBSEQUENT PRENATAL CARE: CPT | Performed by: OBSTETRICS & GYNECOLOGY

## 2023-02-15 PROCEDURE — 3074F SYST BP LT 130 MM HG: CPT | Performed by: OBSTETRICS & GYNECOLOGY

## 2023-02-15 PROCEDURE — 81002 URINALYSIS NONAUTO W/O SCOPE: CPT | Performed by: OBSTETRICS & GYNECOLOGY

## 2023-02-15 PROCEDURE — 3078F DIAST BP <80 MM HG: CPT | Performed by: OBSTETRICS & GYNECOLOGY

## 2023-02-15 NOTE — PROGRESS NOTES
No issues. GBS collected. Did 3T labs yesterday (results pending). RTC 1 wk. Has labor instructions.

## 2023-02-17 LAB — GROUP B STREP BY PCR FOR PCR OVT: NEGATIVE

## 2023-02-23 ENCOUNTER — ROUTINE PRENATAL (OUTPATIENT)
Dept: OBGYN CLINIC | Facility: CLINIC | Age: 33
End: 2023-02-23

## 2023-02-23 VITALS
WEIGHT: 164 LBS | HEART RATE: 99 BPM | BODY MASS INDEX: 29 KG/M2 | DIASTOLIC BLOOD PRESSURE: 74 MMHG | SYSTOLIC BLOOD PRESSURE: 106 MMHG

## 2023-02-23 DIAGNOSIS — Z34.91 ENCOUNTER FOR SUPERVISION OF NORMAL PREGNANCY IN FIRST TRIMESTER, UNSPECIFIED GRAVIDITY: Primary | ICD-10-CM

## 2023-02-23 LAB
APPEARANCE: CLEAR
BILIRUBIN: NEGATIVE
GLUCOSE (URINE DIPSTICK): NEGATIVE MG/DL
KETONES (URINE DIPSTICK): NEGATIVE MG/DL
LEUKOCYTES: NEGATIVE
MULTISTIX LOT#: NORMAL NUMERIC
NITRITE, URINE: NEGATIVE
OCCULT BLOOD: NEGATIVE
PH, URINE: 7 (ref 4.5–8)
PROTEIN (URINE DIPSTICK): NEGATIVE MG/DL
SPECIFIC GRAVITY: 1 (ref 1–1.03)
URINE-COLOR: YELLOW
UROBILINOGEN,SEMI-QN: 0.2 MG/DL (ref 0–1.9)

## 2023-02-23 PROCEDURE — 0502F SUBSEQUENT PRENATAL CARE: CPT | Performed by: OBSTETRICS & GYNECOLOGY

## 2023-02-23 PROCEDURE — 3074F SYST BP LT 130 MM HG: CPT | Performed by: OBSTETRICS & GYNECOLOGY

## 2023-02-23 PROCEDURE — 81002 URINALYSIS NONAUTO W/O SCOPE: CPT | Performed by: OBSTETRICS & GYNECOLOGY

## 2023-02-23 PROCEDURE — 3078F DIAST BP <80 MM HG: CPT | Performed by: OBSTETRICS & GYNECOLOGY

## 2023-03-02 ENCOUNTER — ROUTINE PRENATAL (OUTPATIENT)
Dept: OBGYN CLINIC | Facility: CLINIC | Age: 33
End: 2023-03-02

## 2023-03-02 VITALS
WEIGHT: 164.38 LBS | DIASTOLIC BLOOD PRESSURE: 76 MMHG | SYSTOLIC BLOOD PRESSURE: 112 MMHG | BODY MASS INDEX: 29 KG/M2 | HEART RATE: 96 BPM

## 2023-03-02 DIAGNOSIS — Z34.93 ENCOUNTER FOR SUPERVISION OF NORMAL PREGNANCY IN THIRD TRIMESTER, UNSPECIFIED GRAVIDITY: Primary | ICD-10-CM

## 2023-03-02 LAB
APPEARANCE: CLEAR
BILIRUBIN: NEGATIVE
GLUCOSE (URINE DIPSTICK): NEGATIVE MG/DL
KETONES (URINE DIPSTICK): NEGATIVE MG/DL
LEUKOCYTES: NEGATIVE
MULTISTIX LOT#: NORMAL NUMERIC
NITRITE, URINE: NEGATIVE
OCCULT BLOOD: NEGATIVE
PH, URINE: 6 (ref 4.5–8)
PROTEIN (URINE DIPSTICK): NEGATIVE MG/DL
SPECIFIC GRAVITY: 1 (ref 1–1.03)
URINE-COLOR: YELLOW
UROBILINOGEN,SEMI-QN: 0.2 MG/DL (ref 0–1.9)

## 2023-03-02 PROCEDURE — 3078F DIAST BP <80 MM HG: CPT | Performed by: OBSTETRICS & GYNECOLOGY

## 2023-03-02 PROCEDURE — 3074F SYST BP LT 130 MM HG: CPT | Performed by: OBSTETRICS & GYNECOLOGY

## 2023-03-02 PROCEDURE — 0502F SUBSEQUENT PRENATAL CARE: CPT | Performed by: OBSTETRICS & GYNECOLOGY

## 2023-03-02 PROCEDURE — 81002 URINALYSIS NONAUTO W/O SCOPE: CPT | Performed by: OBSTETRICS & GYNECOLOGY

## 2023-03-02 NOTE — PROGRESS NOTES
Having anxiety about delivering a larger baby because her  was a 10# baby. Cervix 1.5/90/-2.  RTC 1 wk

## 2023-03-04 ENCOUNTER — HOSPITAL ENCOUNTER (INPATIENT)
Facility: HOSPITAL | Age: 33
LOS: 2 days | Discharge: HOME | End: 2023-03-06
Attending: OBSTETRICS & GYNECOLOGY | Admitting: OBSTETRICS & GYNECOLOGY
Payer: MEDICAID

## 2023-03-04 ENCOUNTER — ANESTHESIA (OUTPATIENT)
Dept: OBGYN UNIT | Facility: HOSPITAL | Age: 33
End: 2023-03-04
Payer: MEDICAID

## 2023-03-04 ENCOUNTER — ANESTHESIA EVENT (OUTPATIENT)
Dept: OBGYN UNIT | Facility: HOSPITAL | Age: 33
End: 2023-03-04
Payer: MEDICAID

## 2023-03-04 PROBLEM — Z34.90 PREGNANT: Status: ACTIVE | Noted: 2023-03-04

## 2023-03-04 PROBLEM — Z34.90 PREGNANT (HCC): Status: ACTIVE | Noted: 2023-03-04

## 2023-03-04 LAB
ANTIBODY SCREEN: NEGATIVE
BASOPHILS # BLD AUTO: 0.01 X10(3) UL (ref 0–0.2)
BASOPHILS NFR BLD AUTO: 0.1 %
DEPRECATED RDW RBC AUTO: 43.5 FL (ref 35.1–46.3)
EOSINOPHIL # BLD AUTO: 0.12 X10(3) UL (ref 0–0.7)
EOSINOPHIL NFR BLD AUTO: 1 %
ERYTHROCYTE [DISTWIDTH] IN BLOOD BY AUTOMATED COUNT: 14.5 % (ref 11–15)
HCT VFR BLD AUTO: 33.6 %
HGB BLD-MCNC: 11 G/DL
IMM GRANULOCYTES # BLD AUTO: 0.03 X10(3) UL (ref 0–1)
IMM GRANULOCYTES NFR BLD: 0.3 %
LYMPHOCYTES # BLD AUTO: 2.49 X10(3) UL (ref 1–4)
LYMPHOCYTES NFR BLD AUTO: 21.2 %
MCH RBC QN AUTO: 27.2 PG (ref 26–34)
MCHC RBC AUTO-ENTMCNC: 32.7 G/DL (ref 31–37)
MCV RBC AUTO: 83 FL
MONOCYTES # BLD AUTO: 0.62 X10(3) UL (ref 0.1–1)
MONOCYTES NFR BLD AUTO: 5.3 %
NEUTROPHILS # BLD AUTO: 8.48 X10 (3) UL (ref 1.5–7.7)
NEUTROPHILS # BLD AUTO: 8.48 X10(3) UL (ref 1.5–7.7)
NEUTROPHILS NFR BLD AUTO: 72.1 %
PLATELET # BLD AUTO: 189 10(3)UL (ref 150–450)
RBC # BLD AUTO: 4.05 X10(6)UL
RH BLOOD TYPE: POSITIVE
SARS-COV-2 RNA RESP QL NAA+PROBE: NOT DETECTED
WBC # BLD AUTO: 11.8 X10(3) UL (ref 4–11)

## 2023-03-04 RX ORDER — TERBUTALINE SULFATE 1 MG/ML
0.25 INJECTION, SOLUTION SUBCUTANEOUS AS NEEDED
Status: DISCONTINUED | OUTPATIENT
Start: 2023-03-04 | End: 2023-03-05 | Stop reason: HOSPADM

## 2023-03-04 RX ORDER — LIDOCAINE HYDROCHLORIDE AND EPINEPHRINE 15; 5 MG/ML; UG/ML
INJECTION, SOLUTION EPIDURAL
Status: COMPLETED | OUTPATIENT
Start: 2023-03-04 | End: 2023-03-04

## 2023-03-04 RX ORDER — NALBUPHINE HCL 10 MG/ML
2.5 AMPUL (ML) INJECTION
Status: DISCONTINUED | OUTPATIENT
Start: 2023-03-04 | End: 2023-03-05

## 2023-03-04 RX ORDER — DEXTROSE, SODIUM CHLORIDE, SODIUM LACTATE, POTASSIUM CHLORIDE, AND CALCIUM CHLORIDE 5; .6; .31; .03; .02 G/100ML; G/100ML; G/100ML; G/100ML; G/100ML
INJECTION, SOLUTION INTRAVENOUS AS NEEDED
Status: DISCONTINUED | OUTPATIENT
Start: 2023-03-04 | End: 2023-03-05 | Stop reason: HOSPADM

## 2023-03-04 RX ORDER — BUPIVACAINE HCL/0.9 % NACL/PF 0.25 %
5 PLASTIC BAG, INJECTION (ML) EPIDURAL AS NEEDED
Status: DISCONTINUED | OUTPATIENT
Start: 2023-03-04 | End: 2023-03-05

## 2023-03-04 RX ORDER — TRISODIUM CITRATE DIHYDRATE AND CITRIC ACID MONOHYDRATE 500; 334 MG/5ML; MG/5ML
30 SOLUTION ORAL AS NEEDED
Status: DISCONTINUED | OUTPATIENT
Start: 2023-03-04 | End: 2023-03-05 | Stop reason: HOSPADM

## 2023-03-04 RX ORDER — LIDOCAINE HYDROCHLORIDE 10 MG/ML
INJECTION, SOLUTION INFILTRATION; PERINEURAL
Status: COMPLETED | OUTPATIENT
Start: 2023-03-04 | End: 2023-03-04

## 2023-03-04 RX ORDER — ACETAMINOPHEN 500 MG
500 TABLET ORAL EVERY 6 HOURS PRN
Status: DISCONTINUED | OUTPATIENT
Start: 2023-03-04 | End: 2023-03-05

## 2023-03-04 RX ORDER — BUPIVACAINE HYDROCHLORIDE 2.5 MG/ML
INJECTION, SOLUTION EPIDURAL; INFILTRATION; INTRACAUDAL
Status: COMPLETED | OUTPATIENT
Start: 2023-03-04 | End: 2023-03-04

## 2023-03-04 RX ORDER — LIDOCAINE HYDROCHLORIDE 10 MG/ML
30 INJECTION, SOLUTION EPIDURAL; INFILTRATION; INTRACAUDAL; PERINEURAL ONCE
Status: DISCONTINUED | OUTPATIENT
Start: 2023-03-04 | End: 2023-03-05 | Stop reason: HOSPADM

## 2023-03-04 RX ORDER — LIDOCAINE HYDROCHLORIDE 10 MG/ML
INJECTION, SOLUTION EPIDURAL; INFILTRATION; INTRACAUDAL; PERINEURAL
Status: DISCONTINUED
Start: 2023-03-04 | End: 2023-03-04 | Stop reason: WASHOUT

## 2023-03-04 RX ORDER — AMMONIA INHALANTS 0.04 G/.3ML
0.3 INHALANT RESPIRATORY (INHALATION) AS NEEDED
Status: DISCONTINUED | OUTPATIENT
Start: 2023-03-04 | End: 2023-03-05 | Stop reason: HOSPADM

## 2023-03-04 RX ORDER — SODIUM CHLORIDE, SODIUM LACTATE, POTASSIUM CHLORIDE, CALCIUM CHLORIDE 600; 310; 30; 20 MG/100ML; MG/100ML; MG/100ML; MG/100ML
INJECTION, SOLUTION INTRAVENOUS CONTINUOUS
Status: DISCONTINUED | OUTPATIENT
Start: 2023-03-04 | End: 2023-03-05 | Stop reason: HOSPADM

## 2023-03-04 RX ORDER — ONDANSETRON 2 MG/ML
4 INJECTION INTRAMUSCULAR; INTRAVENOUS EVERY 6 HOURS PRN
Status: DISCONTINUED | OUTPATIENT
Start: 2023-03-04 | End: 2023-03-05

## 2023-03-04 RX ORDER — LIDOCAINE HYDROCHLORIDE AND EPINEPHRINE 15; 5 MG/ML; UG/ML
INJECTION, SOLUTION EPIDURAL
Status: DISPENSED
Start: 2023-03-04 | End: 2023-03-05

## 2023-03-04 RX ORDER — BUPIVACAINE HYDROCHLORIDE 2.5 MG/ML
20 INJECTION, SOLUTION EPIDURAL; INFILTRATION; INTRACAUDAL ONCE
Status: COMPLETED | OUTPATIENT
Start: 2023-03-04 | End: 2023-03-04

## 2023-03-04 RX ORDER — SODIUM CHLORIDE 9 MG/ML
INJECTION INTRAVENOUS
Status: COMPLETED
Start: 2023-03-04 | End: 2023-03-04

## 2023-03-04 RX ORDER — IBUPROFEN 600 MG/1
600 TABLET ORAL EVERY 6 HOURS PRN
Status: DISCONTINUED | OUTPATIENT
Start: 2023-03-04 | End: 2023-03-05

## 2023-03-04 RX ADMIN — BUPIVACAINE HYDROCHLORIDE 3 ML: 2.5 INJECTION, SOLUTION EPIDURAL; INFILTRATION; INTRACAUDAL at 23:50:00

## 2023-03-04 RX ADMIN — LIDOCAINE HYDROCHLORIDE AND EPINEPHRINE 3 ML: 15; 5 INJECTION, SOLUTION EPIDURAL at 23:47:00

## 2023-03-04 RX ADMIN — LIDOCAINE HYDROCHLORIDE 3 ML: 10 INJECTION, SOLUTION INFILTRATION; PERINEURAL at 23:35:00

## 2023-03-05 ENCOUNTER — TELEPHONE (OUTPATIENT)
Dept: OBGYN CLINIC | Facility: CLINIC | Age: 33
End: 2023-03-05

## 2023-03-05 PROCEDURE — 59409 OBSTETRICAL CARE: CPT | Performed by: OBSTETRICS & GYNECOLOGY

## 2023-03-05 RX ORDER — ONDANSETRON 2 MG/ML
4 INJECTION INTRAMUSCULAR; INTRAVENOUS EVERY 6 HOURS PRN
Status: DISCONTINUED | OUTPATIENT
Start: 2023-03-05 | End: 2023-03-05

## 2023-03-05 RX ORDER — BISACODYL 10 MG
10 SUPPOSITORY, RECTAL RECTAL ONCE AS NEEDED
Status: DISCONTINUED | OUTPATIENT
Start: 2023-03-05 | End: 2023-03-06

## 2023-03-05 RX ORDER — CHOLECALCIFEROL (VITAMIN D3) 25 MCG
1 TABLET,CHEWABLE ORAL DAILY
Status: DISCONTINUED | OUTPATIENT
Start: 2023-03-05 | End: 2023-03-06

## 2023-03-05 RX ORDER — ONDANSETRON 2 MG/ML
4 INJECTION INTRAMUSCULAR; INTRAVENOUS EVERY 6 HOURS PRN
Status: DISCONTINUED | OUTPATIENT
Start: 2023-03-05 | End: 2023-03-06

## 2023-03-05 RX ORDER — BISACODYL 10 MG
10 SUPPOSITORY, RECTAL RECTAL ONCE AS NEEDED
Status: DISCONTINUED | OUTPATIENT
Start: 2023-03-05 | End: 2023-03-05

## 2023-03-05 RX ORDER — DIAPER,BRIEF,INFANT-TODD,DISP
1 EACH MISCELLANEOUS EVERY 6 HOURS PRN
Status: DISCONTINUED | OUTPATIENT
Start: 2023-03-05 | End: 2023-03-05

## 2023-03-05 RX ORDER — DOCUSATE SODIUM 100 MG/1
100 CAPSULE, LIQUID FILLED ORAL
Status: DISCONTINUED | OUTPATIENT
Start: 2023-03-05 | End: 2023-03-06

## 2023-03-05 RX ORDER — IBUPROFEN 600 MG/1
600 TABLET ORAL EVERY 6 HOURS
Status: DISCONTINUED | OUTPATIENT
Start: 2023-03-05 | End: 2023-03-05

## 2023-03-05 RX ORDER — ACETAMINOPHEN 500 MG
1000 TABLET ORAL EVERY 6 HOURS PRN
Status: DISCONTINUED | OUTPATIENT
Start: 2023-03-05 | End: 2023-03-05

## 2023-03-05 RX ORDER — AMMONIA INHALANTS 0.04 G/.3ML
0.3 INHALANT RESPIRATORY (INHALATION) AS NEEDED
Status: DISCONTINUED | OUTPATIENT
Start: 2023-03-05 | End: 2023-03-05

## 2023-03-05 RX ORDER — ACETAMINOPHEN 500 MG
500 TABLET ORAL EVERY 6 HOURS PRN
Status: DISCONTINUED | OUTPATIENT
Start: 2023-03-05 | End: 2023-03-06

## 2023-03-05 RX ORDER — DIAPER,BRIEF,INFANT-TODD,DISP
1 EACH MISCELLANEOUS EVERY 6 HOURS PRN
Status: DISCONTINUED | OUTPATIENT
Start: 2023-03-05 | End: 2023-03-06

## 2023-03-05 RX ORDER — IBUPROFEN 600 MG/1
600 TABLET ORAL EVERY 6 HOURS
Status: DISCONTINUED | OUTPATIENT
Start: 2023-03-05 | End: 2023-03-06

## 2023-03-05 RX ORDER — ACETAMINOPHEN 500 MG
1000 TABLET ORAL EVERY 6 HOURS PRN
Status: DISCONTINUED | OUTPATIENT
Start: 2023-03-05 | End: 2023-03-06

## 2023-03-05 RX ORDER — DOCUSATE SODIUM 100 MG/1
100 CAPSULE, LIQUID FILLED ORAL
Status: DISCONTINUED | OUTPATIENT
Start: 2023-03-05 | End: 2023-03-05

## 2023-03-05 RX ORDER — SIMETHICONE 80 MG
80 TABLET,CHEWABLE ORAL 3 TIMES DAILY PRN
Status: DISCONTINUED | OUTPATIENT
Start: 2023-03-05 | End: 2023-03-06

## 2023-03-05 RX ORDER — SIMETHICONE 80 MG
80 TABLET,CHEWABLE ORAL 3 TIMES DAILY PRN
Status: DISCONTINUED | OUTPATIENT
Start: 2023-03-05 | End: 2023-03-05

## 2023-03-05 RX ORDER — ACETAMINOPHEN 500 MG
500 TABLET ORAL EVERY 6 HOURS PRN
Status: DISCONTINUED | OUTPATIENT
Start: 2023-03-05 | End: 2023-03-05

## 2023-03-05 RX ORDER — CHOLECALCIFEROL (VITAMIN D3) 25 MCG
1 TABLET,CHEWABLE ORAL DAILY
Status: DISCONTINUED | OUTPATIENT
Start: 2023-03-05 | End: 2023-03-05

## 2023-03-05 NOTE — PLAN OF CARE
Problem: Patient Centered Care  Goal: Patient preferences are identified and integrated in the patient's plan of care  Description: Interventions:  - What would you like us to know as we care for you? Second baby, breastfeeding. 15year old boy at home. - Provide timely, complete, and accurate information to patient/family  - Incorporate patient and family knowledge, values, beliefs, and cultural backgrounds into the planning and delivery of care  - Encourage patient/family to participate in care and decision-making at the level they choose  - Honor patient and family perspectives and choices  Outcome: Progressing     Problem: Patient/Family Goals  Goal: Patient/Family Long Term Goal  Description: Patient's Long Term Goal: Healthy postpartum progression    Interventions:  - Lactation support as needed by RN and support staff  - Timely assessments, clustered care where possible  - Pain control per [facility], discussed with patient  - Bleeding control, abnormal signs discussed with family  - Updated per patient and family preferences   - See additional Care Plan goals for specific interventions  Outcome: Progressing  Goal: Patient/Family Short Term Goal  Description: Patient's Short Term Goal: Breastfeed and bond with infant    Interventions:   - See above  - See additional Care Plan goals for specific interventions  Outcome: Progressing     Problem: POSTPARTUM  Goal: Long Term Goal:Experiences normal postpartum course  Description: INTERVENTIONS:  - Assess and monitor vital signs and lab values. - Assess fundus and lochia. - Provide ice/sitz baths for perineum discomfort. - Monitor healing of incision/episiotomy/laceration, and assess for signs and symptoms of infection and hematoma. - Assess bladder function and monitor for bladder distention.  - Provide/instruct/assist with pericare as needed. - Provide VTE prophylaxis as needed.   - Monitor bowel function.  - Encourage ambulation and provide assistance as needed. - Assess and monitor emotional status and provide social service/psych resources as needed. - Utilize standard precautions and use personal protective equipment as indicated. Ensure aseptic care of all intravenous lines and invasive tubes/drains.  - Obtain immunization and exposure to communicable diseases history. Outcome: Progressing  Goal: Optimize infant feeding at the breast  Description: INTERVENTIONS:  - Initiate breast feeding within first hour after birth. - Monitor effectiveness of current breast feeding efforts. - Assess support systems available to mother/family.  - Identify cultural beliefs/practices regarding lactation, letdown techniques, maternal food preferences. - Assess mother's knowledge and previous experience with breast feeding.  - Provide information as needed about early infant feeding cues (e.g., rooting, lip smacking, sucking fingers/hand) versus late cue of crying.  - Discuss/demonstrate breast feeding aids (e.g., infant sling, nursing footstool/pillows, and breast pumps). - Encourage mother/other family members to express feelings/concerns, and actively listen. - Educate father/SO about benefits of breast feeding and how to manage common lactation challenges. - Recommend avoidance of specific medications or substances incompatible with breast feeding.  - Assess and monitor for signs of nipple pain/trauma. - Instruct and provide assistance with proper latch. - Review techniques for milk expression (breast pumping) and storage of breast milk. Provide pumping equipment/supplies, instructions and assistance, as needed. - Encourage rooming-in and breast feeding on demand.  - Encourage skin-to-skin contact. - Provide LC support as needed. - Assess for and manage engorgement. - Provide breast feeding education handouts and information on community breast feeding support.    Outcome: Progressing  Goal: Establishment of adequate milk supply with medication/procedure interruptions  Description: INTERVENTIONS:  - Review techniques for milk expression (breast pumping). - Provide pumping equipment/supplies, instructions, and assistance until it is safe to breastfeed infant. Outcome: Progressing  Goal: Appropriate maternal -  bonding  Description: INTERVENTIONS:  - Assess caregiver- interactions. - Assess caregiver's emotional status and coping mechanisms. - Encourage caregiver to participate in  daily care. - Assess support systems available to mother/family.  - Provide /case management support as needed.   Outcome: Progressing

## 2023-03-05 NOTE — L&D DELIVERY NOTE
[facility]    Vaginal Delivery Note    Adelaide Zamarripa Patient Status:  Inpatient    1990 MRN [id number]   Location [address] Attending Pavan Laura, [address] [facility] Day # 1 PCP Maida Wetzel MD     Delivery     Infant  Date of Delivery: 3/5/2023   Time of Delivery: 7:29 AM  Delivery Type: Vaginal, Vacuum (Extractor)    Infant Sex  Information for the patient's : Cecile Etienne Girl [[id number]]   female    Infant Birthweight  Information for the patient's : Cecile Yee [[id number]]   No birth weight on file. Presentation Vertex [1]  Position          Apgars:  1 minute:                 5 minutes:                       Neonatologist Present: yes--heart tones and vacuum assisted delivery      Placenta  Date/Time of Delivery:     Delivery: spontaneous  Placenta to Pathology: yes      Cord Gases Submitted: yes  Cord Complications: none    Maternal Anesthesia: epidural     Episiotomy/Laceration Repair  LML due to vacuum assisted delivery and fetal heart tones. Also while crown pt unable to push baby out necessitating LML. Repaired with 2/0 vicryl     Delivery Complications  Vacuum Assistance: indication for: non-reassuring fetal status and arrest of descent. Verbal consent obtained. Station of fetal vertex is low (+2 to +3). Bladder emptied: yes. Type of vacuum used was a [employer]. Maximum Pressure in the green zone. Number of pulls 2. Number of pop offs 1. Minutes applied to fetal scalp first pull 35 seconds with subsequent pop off; second pull 54 seconds with removal of cup. . Pressure applied only with contration yes.  scalp injuries: none. Additional comments: see below. Quantitative Blood Loss (mL)        EBL:  200 cc    Delivery Comment:     During pushing pt had prolonged decel that responded to repositioning. During pushing having episodes of late decels, that became more frequent and prolonged.  Pt with great effort but head not descending beyond 2+ station. Counseled on maternal and fetal risks of vacuum assisted delivery including scalp lacs, neurologic injury, and maternal advanced lacerations. Consent provided. See vacuum note above. 2 pull to large crown. Pt unable to push on her own necessitating LML without extension. Baby SCOTTY. Shoulders delivered atraumatically followed by the trunk and LE. Nasopharyngeally bulb suctioned at the perineum and infant vigorous. Cord clamped and cut after 30sec delay. Baby handed to the awaiting nursing personal. Placenta delivered by controlled cord traction intact with a 3 VC and intact. Normal uterine tone with oxytocin infusion. Repair as above with good hemostasis and anatomic re approximation. Sphincter intact. Rectum and vagina clear of sponges.  and patient stable in the delivery room with nursing present. Sponge and needle counts verified.         Josephine Ragland DO   3/5/2023  7:53 AM

## 2023-03-05 NOTE — PROGRESS NOTES
Pt is a 33year old female admitted to TR2/TR2-A. Patient presents with:  R/o Rom: +LOF since 1920; +CTX Q5min 2100; denies VB; +FM  R/o Labor     Pt is [id number] intra-uterine pregnancy. History obtained, consents signed. Oriented to room, staff, and plan of care.

## 2023-03-05 NOTE — PROGRESS NOTES
Report given to [facility]. Patient ambulating to LDR 3 with RN and support person x1. Steady gait observed.

## 2023-03-05 NOTE — ANESTHESIA PROCEDURE NOTES
Labor Analgesia    Date/Time: 3/4/2023 11:35 PM    Performed by: Ricardo Tesfaye MD  Authorized by: Ricardo Tesfaye MD      General Information and Staff    Start Time:  3/4/2023 11:35 PM  End Time:  3/4/2023 11:52 PM  Anesthesiologist:  Ricardo Tesfaye MD  Performed by:   Anesthesiologist  Patient Location:  OB  Reason for Block: labor epidural    Preanesthetic Checklist: patient identified, IV checked, site marked, risks and benefits discussed, monitors and equipment checked, pre-op evaluation, timeout performed, anesthesia consent and sterile technique used      Procedure Details    Patient Position:  Sitting  Prep: ChloraPrep    Monitoring:  Heart rate  Approach:  Midline    Epidural Needle    Injection Technique:  ESTRELLA air  Needle Type:  Tuohy  Needle Gauge:  18 G  Needle Length:  3.5 in  Location:  L2-3    Spinal Needle      Catheter    Catheter Type:  Multi-orifice  Catheter Size:  20 G  Test Dose:  Negative    Assessment      Additional Comments

## 2023-03-05 NOTE — PROGRESS NOTES
Patient up to bathroom with assist x 2. Voided 200cc at this time. Patient transferred to mother/baby room 357 per wheelchair in stable condition with baby and personal belongings. Accompanied by significant other and staff. Report given to Ivan Perdue mother/baby RN.

## 2023-03-06 VITALS
WEIGHT: 164 LBS | OXYGEN SATURATION: 98 % | TEMPERATURE: 98 F | RESPIRATION RATE: 18 BRPM | HEIGHT: 63 IN | HEART RATE: 74 BPM | BODY MASS INDEX: 29.06 KG/M2 | DIASTOLIC BLOOD PRESSURE: 62 MMHG | SYSTOLIC BLOOD PRESSURE: 108 MMHG

## 2023-03-06 LAB
BASOPHILS # BLD AUTO: 0.02 X10(3) UL (ref 0–0.2)
BASOPHILS NFR BLD AUTO: 0.2 %
DEPRECATED RDW RBC AUTO: 46 FL (ref 35.1–46.3)
EOSINOPHIL # BLD AUTO: 0.18 X10(3) UL (ref 0–0.7)
EOSINOPHIL NFR BLD AUTO: 2 %
ERYTHROCYTE [DISTWIDTH] IN BLOOD BY AUTOMATED COUNT: 14.7 % (ref 11–15)
HCT VFR BLD AUTO: 27.9 %
HGB BLD-MCNC: 8.8 G/DL
IMM GRANULOCYTES # BLD AUTO: 0.03 X10(3) UL (ref 0–1)
IMM GRANULOCYTES NFR BLD: 0.3 %
LYMPHOCYTES # BLD AUTO: 1.67 X10(3) UL (ref 1–4)
LYMPHOCYTES NFR BLD AUTO: 18.1 %
MCH RBC QN AUTO: 27.1 PG (ref 26–34)
MCHC RBC AUTO-ENTMCNC: 31.5 G/DL (ref 31–37)
MCV RBC AUTO: 85.8 FL
MONOCYTES # BLD AUTO: 0.47 X10(3) UL (ref 0.1–1)
MONOCYTES NFR BLD AUTO: 5.1 %
NEUTROPHILS # BLD AUTO: 6.85 X10 (3) UL (ref 1.5–7.7)
NEUTROPHILS # BLD AUTO: 6.85 X10(3) UL (ref 1.5–7.7)
NEUTROPHILS NFR BLD AUTO: 74.3 %
PLATELET # BLD AUTO: 130 10(3)UL (ref 150–450)
RBC # BLD AUTO: 3.25 X10(6)UL
WBC # BLD AUTO: 9.2 X10(3) UL (ref 4–11)

## 2023-03-06 RX ORDER — IBUPROFEN 600 MG/1
600 TABLET ORAL EVERY 6 HOURS
Qty: 20 TABLET | Refills: 0 | Status: SHIPPED | OUTPATIENT
Start: 2023-03-06

## 2023-03-06 NOTE — PLAN OF CARE
Problem: Patient Centered Care  Goal: Patient preferences are identified and integrated in the patient's plan of care  Description: Interventions:  - What would you like us to know as we care for you? Provide timely, complete, and accurate information to patient/family  - Incorporate patient and family knowledge, values, beliefs, and cultural backgrounds into the planning and delivery of care  - Encourage patient/family to participate in care and decision-making at the level they choose  - Honor patient and family perspectives and choices  Outcome: Completed     Problem: Patient/Family Goals  Goal: Patient/Family Long Term Goal  Description: Patient's Long Term Goal: Healthy postpartum progression    Interventions:  - Lactation support as needed by RN and support staff  - Timely assessments, clustered care where possible  - Pain control per [facility], discussed with patient  - Bleeding control, abnormal signs discussed with family  - Updated per patient and family preferences   - See additional Care Plan goals for specific interventions  Outcome: Completed  Goal: Patient/Family Short Term Goal  Description: Patient's Short Term Goal: Breastfeed and bond with infant    Interventions:   - See above  - See additional Care Plan goals for specific interventions  Outcome: Completed     Problem: POSTPARTUM  Goal: Long Term Goal:Experiences normal postpartum course  Description: INTERVENTIONS:  - Assess and monitor vital signs and lab values. - Assess fundus and lochia. - Provide ice/sitz baths for perineum discomfort. - Monitor healing of incision/episiotomy/laceration, and assess for signs and symptoms of infection and hematoma. - Assess bladder function and monitor for bladder distention.  - Provide/instruct/assist with pericare as needed. - Provide VTE prophylaxis as needed. - Monitor bowel function.  - Encourage ambulation and provide assistance as needed.   - Assess and monitor emotional status and provide social service/psych resources as needed. - Utilize standard precautions and use personal protective equipment as indicated. Ensure aseptic care of all intravenous lines and invasive tubes/drains.  - Obtain immunization and exposure to communicable diseases history. Outcome: Completed  Goal: Optimize infant feeding at the breast  Description: INTERVENTIONS:  - Initiate breast feeding within first hour after birth. - Monitor effectiveness of current breast feeding efforts. - Assess support systems available to mother/family.  - Identify cultural beliefs/practices regarding lactation, letdown techniques, maternal food preferences. - Assess mother's knowledge and previous experience with breast feeding.  - Provide information as needed about early infant feeding cues (e.g., rooting, lip smacking, sucking fingers/hand) versus late cue of crying.  - Discuss/demonstrate breast feeding aids (e.g., infant sling, nursing footstool/pillows, and breast pumps). - Encourage mother/other family members to express feelings/concerns, and actively listen. - Educate father/SO about benefits of breast feeding and how to manage common lactation challenges. - Recommend avoidance of specific medications or substances incompatible with breast feeding.  - Assess and monitor for signs of nipple pain/trauma. - Instruct and provide assistance with proper latch. - Review techniques for milk expression (breast pumping) and storage of breast milk. Provide pumping equipment/supplies, instructions and assistance, as needed. - Encourage rooming-in and breast feeding on demand.  - Encourage skin-to-skin contact. - Provide LC support as needed. - Assess for and manage engorgement. - Provide breast feeding education handouts and information on community breast feeding support.    Outcome: Completed  Goal: Establishment of adequate milk supply with medication/procedure interruptions  Description: INTERVENTIONS:  - Review techniques for milk expression (breast pumping). - Provide pumping equipment/supplies, instructions, and assistance until it is safe to breastfeed infant. Outcome: Completed  Goal: Appropriate maternal -  bonding  Description: INTERVENTIONS:  - Assess caregiver- interactions. - Assess caregiver's emotional status and coping mechanisms. - Encourage caregiver to participate in  daily care. - Assess support systems available to mother/family.  - Provide /case management support as needed. Outcome: Completed    Discharge order received from MD.  Postpartum folder given, discharge medication form reviewed, signed and given to patient. ID Band matched with baby band. Patient informed when to make a follow-up appointment with OB. Mother is interacting appropriately with baby. Verbalized understanding of follow-up instructions. Discharged in stable condition via wheelchair.

## 2023-03-06 NOTE — LACTATION NOTE
This note was copied from a baby's chart. 03/06/23 0900   Evaluation Type   Evaluation Type Inpatient   Problems & Assessment   Infant Assessment Hunger cues present   Feeding Assessment   Summary Current Feeding Breastfeeding exclusively   Breastfeeding Assessment Assisted with breastfeeding w/mother's permission;Calm and ready to breastfeed;Coordinated suck/swallow   Breastfeeding lasted # of minutes 30   Breastfeeding Positions right breast;laid back;football;left breast   Latch 2   Audible Sucks/Swallows 2   Type of Nipple 2   Comfort (Breast/Nipple) 2   Hold (Positioning) 1   LATCH Score 9   Other (comment) Instructed MOB on breast massage , nipple wetting with colostrum prior to latches; able to teach back. Demonstrated and assisted with altch ing football position. Reviewed nutritive suck and demonstrated how to elicit it when infant on the breast, able to teach back. Lactation discharge  handout provided.

## 2023-03-06 NOTE — LACTATION NOTE
Lactation discharge instructions reviewed with pt and significant other. Pt verbalizes understanding of feeding frequency, monitoring of output. Reports/ demonstrates comfort with latching infant. ETC [facility] lactation prn post discharge for support or to schedule an outpatient appointment.

## 2023-03-06 NOTE — ANESTHESIA POSTPROCEDURE EVALUATION
Patient: Gilford Buffalo    Procedure Summary     Date: 03/04/23 Room / Location:     Anesthesia Start: 2335 Anesthesia Stop: 03/05/23 0733    Procedure: LABOR ANALGESIA Diagnosis:     Scheduled Providers:  Anesthesiologist: Giovani Romano MD    Anesthesia Type: epidural ASA Status: 2          Anesthesia Type: epidural      [facility] AN Post Evaluation:   Patient Evaluated in PACU  Patient Participation: complete - patient participated  Level of Consciousness: awake  Pain Management: adequate  Airway Patency:patent  Dental exam unchanged from preop  Yes    Cardiovascular Status: acceptable  Respiratory Status: acceptable  Postoperative Hydration acceptable      Raghavendra Mora MD  3/6/2023 6:43 AM

## 2023-03-28 ENCOUNTER — HOSPITAL ENCOUNTER (OUTPATIENT)
Age: 33
Discharge: HOME | End: 2023-03-28
Payer: MEDICAID

## 2023-03-28 VITALS
OXYGEN SATURATION: 99 % | RESPIRATION RATE: 18 BRPM | HEART RATE: 71 BPM | DIASTOLIC BLOOD PRESSURE: 81 MMHG | TEMPERATURE: 97 F | SYSTOLIC BLOOD PRESSURE: 117 MMHG

## 2023-03-28 DIAGNOSIS — J01.90 ACUTE SINUSITIS, RECURRENCE NOT SPECIFIED, UNSPECIFIED LOCATION: Primary | ICD-10-CM

## 2023-03-28 PROCEDURE — 99213 OFFICE O/P EST LOW 20 MIN: CPT

## 2023-03-28 RX ORDER — AMOXICILLIN AND CLAVULANATE POTASSIUM 875; 125 MG/1; MG/1
1 TABLET, FILM COATED ORAL 2 TIMES DAILY
Qty: 20 TABLET | Refills: 0 | Status: SHIPPED | OUTPATIENT
Start: 2023-03-28 | End: 2023-04-07

## 2023-03-28 NOTE — ED INITIAL ASSESSMENT (HPI)
r sided facial/neck and head pressure since , pt is 3 weeks post partum , breast feeding, also c/o foul smell from vagina, no urinary symptoms, no n/v, no fever

## 2023-04-05 ENCOUNTER — TELEPHONE (OUTPATIENT)
Dept: OBGYN UNIT | Facility: HOSPITAL | Age: 33
End: 2023-04-05

## 2023-04-12 ENCOUNTER — POSTPARTUM (OUTPATIENT)
Dept: OBGYN CLINIC | Facility: CLINIC | Age: 33
End: 2023-04-12

## 2023-04-12 VITALS
HEART RATE: 90 BPM | SYSTOLIC BLOOD PRESSURE: 100 MMHG | DIASTOLIC BLOOD PRESSURE: 66 MMHG | WEIGHT: 139 LBS | BODY MASS INDEX: 25 KG/M2

## 2023-04-12 PROCEDURE — 3078F DIAST BP <80 MM HG: CPT | Performed by: OBSTETRICS & GYNECOLOGY

## 2023-04-12 PROCEDURE — 3074F SYST BP LT 130 MM HG: CPT | Performed by: OBSTETRICS & GYNECOLOGY

## 2024-02-20 ENCOUNTER — PATIENT MESSAGE (OUTPATIENT)
Dept: OBGYN CLINIC | Facility: CLINIC | Age: 34
End: 2024-02-20

## 2024-02-20 NOTE — TELEPHONE ENCOUNTER
Pt requested refill for valtrex due to vaginal outbreak. States she gets outbreaks every month about 3 days prior to her period. States she takes valtrex monthly but not daily. Pt aware she is due for annual and will call office to schedule.    Message to LUNA if OK for valtrex refill?

## 2024-02-20 NOTE — TELEPHONE ENCOUNTER
From: Viola Parham  To: Liana Simon  Sent: 2/20/2024 9:15 AM CST  Subject: Medication not on refill list    Hi, I need a refill of the valacylovir. I’m currently having an outbreak and have none to take. It’s not on my medication list to request a refill that way. Thank you

## 2024-02-21 RX ORDER — VALACYCLOVIR HYDROCHLORIDE 500 MG/1
500 TABLET, FILM COATED ORAL 2 TIMES DAILY
Qty: 6 TABLET | Refills: 0 | Status: SHIPPED | OUTPATIENT
Start: 2024-02-21 | End: 2024-02-24

## 2024-02-21 NOTE — TELEPHONE ENCOUNTER
Pt informed of [place] recs and valtrex sent to pharmacy. My chart sent giving options for annual exam.

## 2024-03-21 ENCOUNTER — OFFICE VISIT (OUTPATIENT)
Dept: FAMILY MEDICINE CLINIC | Facility: CLINIC | Age: 34
End: 2024-03-21

## 2024-03-21 VITALS
HEIGHT: 63 IN | BODY MASS INDEX: 22.5 KG/M2 | WEIGHT: 127 LBS | DIASTOLIC BLOOD PRESSURE: 73 MMHG | SYSTOLIC BLOOD PRESSURE: 106 MMHG | HEART RATE: 67 BPM

## 2024-03-21 DIAGNOSIS — L98.9 SKIN LESION: Primary | ICD-10-CM

## 2024-03-21 PROCEDURE — 99202 OFFICE O/P NEW SF 15 MIN: CPT | Performed by: PHYSICIAN ASSISTANT

## 2024-03-21 NOTE — PROGRESS NOTES
HPI:     HPI  A 33-year-old female is here in the office complaining of 2 spots on her right leg for the past 2 years. They change color sometimes. The lesion is the same size, not getting bigger.  The patient denies pain, redness, and discharge.  Her father was diagnosed with skin cancer.    Medications:     No current outpatient medications on file.       Allergies:   No Known Allergies    History:     Health Maintenance   Topic Date Due    Annual Physical  Never done    DTaP,Tdap,and Td Vaccines (7 - Tdap) 04/06/2015    Pap Smear  05/06/2023    COVID-19 Vaccine (1 - 2023-24 season) Never done    Influenza Vaccine (1) Never done    Annual Depression Screening  01/01/2024    Pneumococcal Vaccine: Birth to 64yrs  Aged Out       No LMP recorded. (Menstrual status: Postpartum).   Past Medical History:     Past Medical History:   Diagnosis Date    Abnormal Pap smear of cervix     Anemia     with prev preg.    Chlamydia     Chronic maxillary sinusitis     Crohn disease (HCC)     Pt states that it is gone now and now has IBS    Decorative tattoo     Deviated nasal septum     Diarrhea     probably IBS, was previously dx'ed with Crohn's    Genital herpes simplex     Herpes     HPV in female     Human papilloma virus infection     Hypertrophy of nasal turbinates     Nasal septal deviation     Varicella     chicken pox in childhood    Visual impairment     glasses       Past Surgical History:     Past Surgical History:   Procedure Laterality Date    Colonoscopy  2/2013    Colonoscopy N/A 4/29/2021    Procedure: COLONOSCOPY;  Surgeon: Joey Strong MD;  Location: [facility] ENDOSCOPY    Colposcopy, cervix w/upper adjacent vagina; w/biopsy(s), cervix      Excision turbinate      Leep  2014    Leep      Nasal scope,bx/rmv polyp/debrid  11/09/2018    Nasal scope,bx/rmv polyp/debrid  09/18/2020    Nasal scopy,rmv tiss maxill sinus      Nasal scopy,rmv tiss maxill sinus  11/09/2018    Repair of nasal septum      Sinus surgery         septoplasty, TBR    Tonsillectomy         Family History:     Family History   Problem Relation Age of Onset    No Known Problems Father     No Known Problems Mother     No Known Problems Sister     No Known Problems Brother        Social History:     Social History     Socioeconomic History    Marital status: Single     Spouse name: Not on file    Number of children: Not on file    Years of education: Not on file    Highest education level: Not on file   Occupational History    Not on file   Tobacco Use    Smoking status: Former     Packs/day: 0.50     Years: 10.00     Additional pack years: 0.00     Total pack years: 5.00     Types: Cigarettes     Quit date: 2022     Years since quittin.7    Smokeless tobacco: Never   Vaping Use    Vaping Use: Some days   Substance and Sexual Activity    Alcohol use: Not Currently     Alcohol/week: 1.0 - 2.0 standard drink of alcohol     Types: 1 - 2 Standard drinks or equivalent per week     Comment: 2-3 drinks every weekend before preg    Drug use: No    Sexual activity: Yes     Partners: Male     Birth control/protection: Condom, OCP     Comment: same partner x 2 years   Other Topics Concern     Service Not Asked    Blood Transfusions No    Caffeine Concern No    Occupational Exposure Not Asked    Hobby Hazards Not Asked    Sleep Concern No    Stress Concern Yes    Weight Concern No    Special Diet No    Back Care No    Exercise Yes     Comment: ocassional    Bike Helmet Not Asked    Seat Belt Yes    Self-Exams Not Asked   Social History Narrative    Work - bartending, 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     Social Determinants of Health     Financial Resource Strain: Low Risk  (3/4/2023)    Financial Resource Strain     Difficulty of Paying Living Expenses: Not hard at all     Med Affordability: Not on file   Food Insecurity: No Food Insecurity (3/4/2023)    Food Insecurity     Food Insecurity: Never true   Transportation Needs: No Transportation Needs  (3/4/2023)    Transportation Needs     Lack of Transportation: No   Physical Activity: Not on file   Stress: No Stress Concern Present (3/4/2023)    Stress     Feeling of Stress : No   Social Connections: Not on file   Housing Stability: Low Risk  (3/4/2023)    Housing Stability     Housing Instability: No     Housing Instability Emergency: Not on file       Review of Systems:   Review of Systems   Skin:  Positive for rash.        Vitals:    03/21/24 1619   BP: 106/73   Pulse: 67   Weight: 127 lb (57.6 kg)   Height: 5' 3\" (1.6 m)     Body mass index is 22.5 kg/m².    Physical Exam:   Physical Exam  Skin:     Findings: Rash present.      There are 2 small lesions on the right leg. The lesion are round, less than 6 mm, river in color. No tenderness.    Assessment and Plan::     Problem List Items Addressed This Visit    None  Visit Diagnoses       Skin lesion    -  Primary        Reassured patient that they are benign. Will observe at this time.    Discussed plan of care with pt and pt is in agreement.All questions answered. Pt to call with questions or concerns.

## 2024-05-30 ENCOUNTER — HOSPITAL ENCOUNTER (OUTPATIENT)
Age: 34
Discharge: HOME | End: 2024-05-30
Payer: MEDICAID

## 2024-05-30 VITALS
RESPIRATION RATE: 16 BRPM | DIASTOLIC BLOOD PRESSURE: 76 MMHG | TEMPERATURE: 97 F | SYSTOLIC BLOOD PRESSURE: 117 MMHG | HEART RATE: 80 BPM | OXYGEN SATURATION: 100 %

## 2024-05-30 DIAGNOSIS — S61.211A LACERATION OF LEFT INDEX FINGER WITHOUT FOREIGN BODY WITHOUT DAMAGE TO NAIL, INITIAL ENCOUNTER: Primary | ICD-10-CM

## 2024-05-30 PROCEDURE — 99213 OFFICE O/P EST LOW 20 MIN: CPT

## 2024-05-30 PROCEDURE — 12001 RPR S/N/AX/GEN/TRNK 2.5CM/<: CPT

## 2024-05-30 RX ORDER — LIDOCAINE HYDROCHLORIDE 10 MG/ML
5 INJECTION, SOLUTION EPIDURAL; INFILTRATION; INTRACAUDAL; PERINEURAL ONCE
Status: COMPLETED | OUTPATIENT
Start: 2024-05-30 | End: 2024-05-30

## 2024-05-30 NOTE — ED PROVIDER NOTES
Patient Seen in: [facility] [place]    History   CC: finger laceration   HPI: Viola Parham 33 year old female  who presents c/o left index finger laceration s/p injury just pta in which pt was cutting with a knife to prepare food and accidentally cut her finger. Denies possibility of fb. Denies numbness, tingling, weakness or limitation in range of motion associated.  Last tetanus within the last 2 years with pregnancy. Denies pain/injury elsewhere associated.     Past Medical History:    Abnormal Pap smear of cervix    Anemia    with prev preg.    Chlamydia    Chronic maxillary sinusitis    Crohn disease (HCC)    Pt states that it is gone now and now has IBS    Decorative tattoo    Deviated nasal septum    Diarrhea    probably IBS, was previously dx'ed with Crohn's    Genital herpes simplex    Herpes    HPV in female    Human papilloma virus infection    Hypertrophy of nasal turbinates    Nasal septal deviation    Varicella    chicken pox in childhood    Visual impairment    glasses       Past Surgical History:   Procedure Laterality Date    Colonoscopy  2/2013    Colonoscopy N/A 4/29/2021    Procedure: COLONOSCOPY;  Surgeon: Joey Strong MD;  Location: [facility] ENDOSCOPY    Colposcopy, cervix w/upper adjacent vagina; w/biopsy(s), cervix      Excision turbinate      Leep  2014    Leep      Nasal scope,bx/rmv polyp/debrid  11/09/2018    Nasal scope,bx/rmv polyp/debrid  09/18/2020    Nasal scopy,rmv tiss maxill sinus      Nasal scopy,rmv tiss maxill sinus  11/09/2018    Repair of nasal septum      Sinus surgery        septoplasty, TBR    Tonsillectomy         Family History   Problem Relation Age of Onset    No Known Problems Father     No Known Problems Mother     No Known Problems Sister     No Known Problems Brother        Social History     Socioeconomic History    Marital status: Single   Tobacco Use    Smoking status: Former     Current packs/day: 0.00     Average packs/day: 0.5 packs/day for 10.0  years (5.0 ttl pk-yrs)     Types: Cigarettes     Start date: 2012     Quit date: 2022     Years since quittin.9    Smokeless tobacco: Never   Vaping Use    Vaping status: Some Days   Substance and Sexual Activity    Alcohol use: Not Currently     Alcohol/week: 1.0 - 2.0 standard drink of alcohol     Types: 1 - 2 Standard drinks or equivalent per week     Comment: 2-3 drinks every weekend before preg    Drug use: No    Sexual activity: Yes     Partners: Male     Birth control/protection: Condom, OCP     Comment: same partner x 2 years   Other Topics Concern    Blood Transfusions No    Caffeine Concern No    Sleep Concern No    Stress Concern Yes    Weight Concern No    Special Diet No    Back Care No    Exercise Yes     Comment: ocassional    Seat Belt Yes   Social History Narrative    Work - bartending, 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     Social Determinants of Health     Financial Resource Strain: Low Risk  (3/4/2023)    Financial Resource Strain     Difficulty of Paying Living Expenses: Not hard at all   Food Insecurity: No Food Insecurity (3/4/2023)    Food Insecurity     Food Insecurity: Never true   Transportation Needs: No Transportation Needs (3/4/2023)    Transportation Needs     Lack of Transportation: No   Stress: No Stress Concern Present (3/4/2023)    Stress     Feeling of Stress : No   Housing Stability: Low Risk  (3/4/2023)    Housing Stability     Housing Instability: No       ROS:  Review of Systems    Positive for stated complaint: finger lac.  Other systems are as noted in HPI.  Constitutional and vital signs reviewed.      All other systems reviewed and negative except as noted above.    PSFH elements reviewed from today and agreed except as otherwise stated in HPI.             Constitutional and vital signs reviewed.        Physical Exam     ED Triage Vitals [24 1616]   /76   Pulse 80   Resp 16   Temp 97.2 °F (36.2 °C)   Temp src Temporal   SpO2 100 %   O2 Device  None (Room air)       Current:/76   Pulse 80   Temp 97.2 °F (36.2 °C) (Temporal)   Resp 16   SpO2 100%         PE:  General - Appears well, non-toxic and in NAD  Head - Appears symmetrical without deformity/swelling cranium, scalp, or facial bones, TMJ bilat without crepitus or limited ROM  Skin - +1cm well aproximmated laceration that is still bleeding when pressure is not held noted to left distal, lateral 2nd digit. Does not involve nailbed. No fb visualized or palpated. Skin otherwise pink warm and dry throughout, mmm, cap refill <2 seconds distal left hand digits  Neuro - A&O x4, sensation equal to both medial and lateral aspects of left hand digits, steady gait  MSK - makes purposeful movements of all left hand digits with full ROM noted, finger flexion/extension strength equal bilat, radial pulses 2+ bilat.  Psych - Interactive and appropriate      ED Course   Labs Reviewed - No data to display    MDM     DDx: Laceration versus foreign body versus abrasion    Wound irrigated prior to closure and topical bacitracin applied following.  Wound care for home reviewed as well as Tylenol and Motrin as needed for discomfort, follow-up and return/ED precautions reviewed.  Patient is historian and demonstrates understanding of all instruction and agrees with plan of care.    Procedure: LACERATION REPAIR  Verbal consent was obtained from the patient. The 1 cm laceration on the left index finger was anesthetized using 1% Lidocaine. The wound was scrubbed, draped and explored to its base with a gloved finger. There were no deep structures involved. No tendon injury was identified.  No foreign body was visualized or palpated.  The wound was repaired with 4.0 nylon - 2 simple interrupted sutures placed. The wound repair was simple. The procedure was performed by myself. Pt tolerated the procedure well.      Disposition and Plan     Clinical Impression:  1. Laceration of left index finger without foreign body  without damage to nail, initial encounter        Disposition:  Discharge    Follow-up:  Samantha Jordan MD  [address] [place] [address] [phone]    Go in 2 days  As needed      Medications Prescribed:  There are no discharge medications for this patient.

## 2024-05-30 NOTE — ED INITIAL ASSESSMENT (HPI)
Presents with laceration to left 2nd finger from kitchen knife PTA. Bleeding controlled with pressure. + distal CMS. TDaP UTD per patient.

## 2024-05-30 NOTE — DISCHARGE INSTRUCTIONS
Make an apt to have your sutures/staples removed in 7-10 days. You should be washing/cleaning the wound 3x daily with regular soap and water. Pat dry, apply antibiotic ointment, and be sure to keep the area clean and dry. You may shower as usual, but do not submerge in water, i.e. sink/bath/pool. To help minimize risk of scaring, keep the area covered and use sunscreen when sun exposure is present. Apply ice to the area to decrease swelling. You may take Tylenol or Motrin as needed for discomfort. Watch for signs of infection such as increased surrounding redness, swelling, drainage, fever. Follow-up with your primary care provider for a wound check in the next 2 days. Return to the [facility] or seek care in the ER for new or worsening symptoms, fever.

## 2024-06-08 ENCOUNTER — HOSPITAL ENCOUNTER (OUTPATIENT)
Age: 34
Discharge: HOME | End: 2024-06-08
Payer: MEDICAID

## 2024-06-08 VITALS
SYSTOLIC BLOOD PRESSURE: 110 MMHG | TEMPERATURE: 99 F | DIASTOLIC BLOOD PRESSURE: 82 MMHG | HEART RATE: 80 BPM | RESPIRATION RATE: 18 BRPM | OXYGEN SATURATION: 100 %

## 2024-06-08 DIAGNOSIS — Z48.02 ENCOUNTER FOR REMOVAL OF SUTURES: Primary | ICD-10-CM

## 2024-06-08 NOTE — ED INITIAL ASSESSMENT (HPI)
Sutures placed to left 2nd finger here 5/30. Here for suture removal. Sutures intact. No signs of infection.

## 2024-06-08 NOTE — ED PROVIDER NOTES
Chief Complaint   Patient presents with    Sut Stap RingRemoval       History obtained from: patient   services not used    HPI:     Viola Parham is a 34 year old female who presents for suture removal.  Patient was seen patient was seen in  on 5/30 for laceration to left index finger after excellently cutting finger with knife while preparing food.  Patient had 2 sutures placed.  Patient reports no issues or complaints with laceration or sutures.  Denies swelling, redness, bleeding, drainage, numbness, weakness, difficulty bending or moving finger, nail injury, fevers.  Tetanus immunization UTD per patient.    PMH  Past Medical History:    Abnormal Pap smear of cervix    Anemia    with prev preg.    Chlamydia    Chronic maxillary sinusitis    Crohn disease (HCC)    Pt states that it is gone now and now has IBS    Decorative tattoo    Deviated nasal septum    Diarrhea    probably IBS, was previously dx'ed with Crohn's    Genital herpes simplex    Herpes    HPV in female    Human papilloma virus infection    Hypertrophy of nasal turbinates    Nasal septal deviation    Varicella    chicken pox in childhood    Visual impairment    glasses       PFSH    PFSH asessment screens reviewed and agree.  Nurses notes reviewed I agree with documentation.    Family History   Problem Relation Age of Onset    No Known Problems Father     No Known Problems Mother     No Known Problems Sister     No Known Problems Brother      Family history reviewed with patient/caregiver and is not pertinent to presenting problem.  Social History     Socioeconomic History    Marital status: Single     Spouse name: Not on file    Number of children: Not on file    Years of education: Not on file    Highest education level: Not on file   Occupational History    Not on file   Tobacco Use    Smoking status: Former     Current packs/day: 0.00     Average packs/day: 0.5 packs/day for 10.0 years (5.0 ttl pk-yrs)     Types: Cigarettes      Start date: 2012     Quit date: 2022     Years since quittin.9    Smokeless tobacco: Never   Vaping Use    Vaping status: Some Days   Substance and Sexual Activity    Alcohol use: Not Currently     Alcohol/week: 1.0 - 2.0 standard drink of alcohol     Types: 1 - 2 Standard drinks or equivalent per week     Comment: 2-3 drinks every weekend before preg    Drug use: No    Sexual activity: Yes     Partners: Male     Birth control/protection: Condom, OCP     Comment: same partner x 2 years   Other Topics Concern     Service Not Asked    Blood Transfusions No    Caffeine Concern No    Occupational Exposure Not Asked    Hobby Hazards Not Asked    Sleep Concern No    Stress Concern Yes    Weight Concern No    Special Diet No    Back Care No    Exercise Yes     Comment: ocassional    Bike Helmet Not Asked    Seat Belt Yes    Self-Exams Not Asked   Social History Narrative    Work - bartending, 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     Social Determinants of Health     Financial Resource Strain: Low Risk  (3/4/2023)    Financial Resource Strain     Difficulty of Paying Living Expenses: Not hard at all     Med Affordability: Not on file   Food Insecurity: No Food Insecurity (3/4/2023)    Food Insecurity     Food Insecurity: Never true   Transportation Needs: No Transportation Needs (3/4/2023)    Transportation Needs     Lack of Transportation: No   Physical Activity: Not on file   Stress: No Stress Concern Present (3/4/2023)    Stress     Feeling of Stress : No   Social Connections: Not on file   Housing Stability: Low Risk  (3/4/2023)    Housing Stability     Housing Instability: No     Housing Instability Emergency: Not on file         ROS:   Positive for stated complaint: Suture removal  All other systems reviewed and negative except as noted above.  Constitutional and Vital Signs Reviewed.    Physical Exam:     Findings:    /82   Pulse 80   Temp 98.8 °F (37.1 °C) (Temporal)   Resp 18    SpO2 100%   GENERAL: well developed, no acute distress, non-toxic appearing   SKIN: good skin turgor, no obvious rashes  HEAD: normocephalic, atraumatic  EYES: sclera non-icteric bilaterally, conjunctiva clear bilaterally  OROPHARYNX: MMM, maintaining airway and secretions  NECK: no nuchal rigidity, no edema, phonation normal    CARDIO: Regular rate, radial pulse 2+, cap refill <2 sec  LUNGS: no increased WOB  EXTREMITIES: 1 cm linear well-approximated laceration to lateral aspect of distal second finger with 2 sutures intact, no drainage or bleeding, no swelling, no tenderness, no surrounding erythema, FROM, compartments soft, CMS intact, nail intact  NEURO: no focal deficits  PSYCH: alert and oriented x3, answering questions appropriately, mood appropriate    MDM/Assessment/Plan:   Orders for this encounter:    No orders of the defined types were placed in this encounter.      Labs performed this visit:  No results found for this or any previous visit (from the past 10 hour(s)).    Imaging performed this visit:  No orders to display       Medical Decision Making  DDx includes laceration versus wound check versus suture removal versus other.  No signs of neurovascular compromise or compartment syndrome.  No signs of infection.  Sutures removed easily, patient tolerated suture removal well.  Antibiotic ointment and Band-Aid applied to wound.  Discussed supportive care and wound care.  Instructed patient to go directly to nearest ER with any worsening or concerning symptoms.  Follow-up with PCP.          Diagnosis:    ICD-10-CM    1. Encounter for removal of sutures  Z48.02           All results reviewed and discussed with patient/patient's family. Patient/patient's family verbalize excellent understanding of instructions and feels comfortable with plan. All of patient's/patient's family's questions were addressed.   See AVS for detailed discharge instructions for your condition today.    Follow Up with:  Nikki  MD Samantha  [address] [place] [address]  [phone]            Note: This document was dictated using Dragon medical dictation software.  Proofreading was performed to the best of my ability, but errors may be present.    Chasidy Machado PA-C

## 2024-11-01 ENCOUNTER — TELEPHONE (OUTPATIENT)
Dept: OBGYN CLINIC | Facility: CLINIC | Age: 34
End: 2024-11-01

## 2024-11-01 DIAGNOSIS — Z32.00 PREGNANCY EXAMINATION OR TEST, PREGNANCY UNCONFIRMED: Primary | ICD-10-CM

## 2024-11-01 NOTE — TELEPHONE ENCOUNTER
Pt calling to report +HPT and LMP of 9/14. States cycles are irregular, every 30-41 days. Pt will start pnv. Pt delivered with our practice before and familiar with seeing all MDs, male and female. Pt scheduled OBN for 11/9. Pt informed since she has irregular cycles, she will need to have hcg quant level done. Order placed. Pt verbalized understanding.

## 2024-11-02 ENCOUNTER — LAB ENCOUNTER (OUTPATIENT)
Dept: LAB | Age: 34
End: 2024-11-02
Attending: OBSTETRICS & GYNECOLOGY
Payer: COMMERCIAL

## 2024-11-02 DIAGNOSIS — Z32.00 PREGNANCY EXAMINATION OR TEST, PREGNANCY UNCONFIRMED: ICD-10-CM

## 2024-11-02 LAB — B-HCG SERPL-ACNC: ABNORMAL MIU/ML

## 2024-11-02 PROCEDURE — 36415 COLL VENOUS BLD VENIPUNCTURE: CPT

## 2024-11-02 PROCEDURE — 84702 CHORIONIC GONADOTROPIN TEST: CPT

## 2024-11-05 ENCOUNTER — TELEPHONE (OUTPATIENT)
Dept: OBGYN CLINIC | Facility: CLINIC | Age: 34
End: 2024-11-05

## 2024-11-05 DIAGNOSIS — N92.6 IRREGULAR PERIODS: Primary | ICD-10-CM

## 2024-11-09 ENCOUNTER — NURSE ONLY (OUTPATIENT)
Dept: OBGYN CLINIC | Facility: CLINIC | Age: 34
End: 2024-11-09

## 2024-11-09 DIAGNOSIS — Z34.81 ENCOUNTER FOR SUPERVISION OF OTHER NORMAL PREGNANCY IN FIRST TRIMESTER (HCC): Primary | ICD-10-CM

## 2024-11-09 RX ORDER — CHOLECALCIFEROL (VITAMIN D3) 25 MCG
1 TABLET,CHEWABLE ORAL DAILY
COMMUNITY

## 2024-11-09 NOTE — PROGRESS NOTES
Pt seen for OBN appt today with no complaints. Normal PN labs ordered in addition to varicella. Pt advised all labs must be completed and resulted prior to NPN appt. If labs are not completed and resulted the NPN appt will be cancelled. Pt informed again of both male and female providers and the need to rotate PN appt with all providers since OB on-call will be the one that delivers her. Assisted pt with scheduling NPN appt with MD.       Height: 5'3\"  Weight: 120  BMI: 21.3    Partner's name is Deric Gates contact #[phone]; race: White  Occupation: Part time work, [employer], cleans houses, [employer]    MEDICAL HISTORY    Anemia Yes With preg 2009   Anesthetic complications No    Anxiety/Depression  No    Autoimmune Disorder No    Asthma  No    Cancer No    Diabetes  No    Gyne/breast Surgery Yes Dilation and curettage, Colposcopy  ,LEEP   Heart Disease No    Hepatitis/Liver Disease  No    History of blood transfusion No    History of abnormal pap Yes 2015   Hypertension  No    Infertility  No    Kidney Disease/Frequent UTIs  No    Medication Allergies No    Latex Allergies No    Food Allergies  No    Neurological Disorder/Epilepsy No    Operations/Hospitalizations Yes Dilation and curettage 2008, Colposcopy 2013,2021, LEEP 2014 Sinus surgery x4 last one 2024   TB exposure  No    Thyroid Dysfunction No    Trauma/Violence  No    Uterine Anomaly  No    Uterine Fibroids  No    Variocosities/DVTs No    Smoker Yes Vaping Quit 9/2024   Drug usage in prior year No    Alcohol yes Prior to preg   Would you accept a blood transfusion?  Yes                INFECTION HISTORY    Chlamydia Yes 11 years ago   Pt or partner have hx of Genital Herpes Yes Patient hx   Gonorrhea No    Hepatitis B No    HIV No    HPV Yes 2015   MRSA No    Syphilis No    Tattoos Yes 4   Live with someone or Exposed to TB No    Rash or viral illness since LMP  No    Varicella Yes Childhood chicken pox   Pets No        GENETICS SCREENING    Genetic  Screening    Genetic Screening/Teratology Counseling- Includes patient, baby's father, or anyone in either family with:  Patient's age 35 years or older as of estimated date of delivery: No     Thalassemia (Italian, Greek, Mediterranean, or  background): MCV less than 80: No     Neural tube defect (Meningomyelocele, Spina bifida, or Anencephaly): No     Congenital heart defect: No     Down syndrome: No     Stefan-Sachs (Ashkenazi Mormonism, Cajun, [place]uran [place]): No     Canavan disease (Ashkenazi Mormonism): No     Familial dysautonomia (Ashkenazi Mormonism): No     Sickle cell disease or trait (): No     Hemophilia or other blood disorders: No     Muscular dystrophy: No    Cystic fibrosis: No     Assaria's chorea: No     Intellectual disability and/or autism: No     Other inherited genetic or chromosomal disorder: No     Maternal metabolic disorder (eg. Type 1 diabetes, PKU): No     Patient or baby's father had child with birth defects not listed above: No     Recurrent pregnancy loss, or a stillbirth: Yes (Comment: pt had 2 SAB)     Medications (including supplements, vitamins, herbs, or OTC drugs)/illicit/recreational drugs/alcohol since last menstrual period: No                 MISC    Infant vaccinations  Yes      Pt. Has answered NO 5P questions and has NO  risk factors.    Pt. Given What pregnant women need to know handout.

## 2024-11-11 ENCOUNTER — LAB ENCOUNTER (OUTPATIENT)
Dept: LAB | Age: 34
End: 2024-11-11
Attending: OBSTETRICS & GYNECOLOGY
Payer: COMMERCIAL

## 2024-11-11 DIAGNOSIS — Z34.81 ENCOUNTER FOR SUPERVISION OF OTHER NORMAL PREGNANCY IN FIRST TRIMESTER (HCC): ICD-10-CM

## 2024-11-11 LAB
BASOPHILS # BLD AUTO: 0.03 X10(3) UL (ref 0–0.2)
BASOPHILS NFR BLD AUTO: 0.4 %
DEPRECATED RDW RBC AUTO: 42.5 FL (ref 35.1–46.3)
EOSINOPHIL # BLD AUTO: 0.15 X10(3) UL (ref 0–0.7)
EOSINOPHIL NFR BLD AUTO: 1.8 %
ERYTHROCYTE [DISTWIDTH] IN BLOOD BY AUTOMATED COUNT: 13.9 % (ref 11–15)
HCT VFR BLD AUTO: 35.5 %
HGB BLD-MCNC: 11.5 G/DL
IMM GRANULOCYTES # BLD AUTO: 0.02 X10(3) UL (ref 0–1)
IMM GRANULOCYTES NFR BLD: 0.2 %
LYMPHOCYTES # BLD AUTO: 1.88 X10(3) UL (ref 1–4)
LYMPHOCYTES NFR BLD AUTO: 22.2 %
MCH RBC QN AUTO: 27.1 PG (ref 26–34)
MCHC RBC AUTO-ENTMCNC: 32.4 G/DL (ref 31–37)
MCV RBC AUTO: 83.7 FL
MONOCYTES # BLD AUTO: 0.38 X10(3) UL (ref 0.1–1)
MONOCYTES NFR BLD AUTO: 4.5 %
NEUTROPHILS # BLD AUTO: 5.99 X10 (3) UL (ref 1.5–7.7)
NEUTROPHILS # BLD AUTO: 5.99 X10(3) UL (ref 1.5–7.7)
NEUTROPHILS NFR BLD AUTO: 70.9 %
PLATELET # BLD AUTO: 236 10(3)UL (ref 150–450)
RBC # BLD AUTO: 4.24 X10(6)UL
WBC # BLD AUTO: 8.5 X10(3) UL (ref 4–11)

## 2024-11-11 PROCEDURE — 86850 RBC ANTIBODY SCREEN: CPT

## 2024-11-11 PROCEDURE — 86762 RUBELLA ANTIBODY: CPT

## 2024-11-11 PROCEDURE — 85025 COMPLETE CBC W/AUTO DIFF WBC: CPT

## 2024-11-11 PROCEDURE — 86900 BLOOD TYPING SEROLOGIC ABO: CPT

## 2024-11-11 PROCEDURE — 87340 HEPATITIS B SURFACE AG IA: CPT

## 2024-11-11 PROCEDURE — 87389 HIV-1 AG W/HIV-1&-2 AB AG IA: CPT

## 2024-11-11 PROCEDURE — 86901 BLOOD TYPING SEROLOGIC RH(D): CPT

## 2024-11-11 PROCEDURE — 86787 VARICELLA-ZOSTER ANTIBODY: CPT

## 2024-11-11 PROCEDURE — 86780 TREPONEMA PALLIDUM: CPT

## 2024-11-11 PROCEDURE — 36415 COLL VENOUS BLD VENIPUNCTURE: CPT

## 2024-11-11 PROCEDURE — 87086 URINE CULTURE/COLONY COUNT: CPT

## 2024-11-11 PROCEDURE — 86803 HEPATITIS C AB TEST: CPT

## 2024-11-12 LAB
ANTIBODY SCREEN: NEGATIVE
HBV SURFACE AG SER-ACNC: <0.1 [IU]/L
HBV SURFACE AG SERPL QL IA: NONREACTIVE
HCV AB SERPL QL IA: NONREACTIVE
RH BLOOD TYPE: POSITIVE
RUBV IGG SER QL: POSITIVE
RUBV IGG SER-ACNC: 49 IU/ML (ref 10–?)
T PALLIDUM AB SER QL IA: NONREACTIVE

## 2024-11-14 LAB — VZV IGG SER IA-ACNC: 8.78 (ref 1–?)

## 2024-11-20 ENCOUNTER — INITIAL PRENATAL (OUTPATIENT)
Dept: OBGYN CLINIC | Facility: CLINIC | Age: 34
End: 2024-11-20

## 2024-11-20 ENCOUNTER — TELEPHONE (OUTPATIENT)
Dept: OBGYN CLINIC | Facility: CLINIC | Age: 34
End: 2024-11-20

## 2024-11-20 VITALS
SYSTOLIC BLOOD PRESSURE: 106 MMHG | BODY MASS INDEX: 22 KG/M2 | HEART RATE: 82 BPM | WEIGHT: 124.81 LBS | DIASTOLIC BLOOD PRESSURE: 71 MMHG

## 2024-11-20 DIAGNOSIS — K50.919 CROHN'S DISEASE WITH COMPLICATION, UNSPECIFIED GASTROINTESTINAL TRACT LOCATION (HCC): ICD-10-CM

## 2024-11-20 DIAGNOSIS — Z98.890 HISTORY OF LEEP (LOOP ELECTROSURGICAL EXCISION PROCEDURE) OF CERVIX COMPLICATING PREGNANCY IN FIRST TRIMESTER (HCC): Primary | ICD-10-CM

## 2024-11-20 DIAGNOSIS — Z34.91 ENCOUNTER FOR SUPERVISION OF NORMAL PREGNANCY IN FIRST TRIMESTER, UNSPECIFIED GRAVIDITY (HCC): Primary | ICD-10-CM

## 2024-11-20 DIAGNOSIS — O34.41 HISTORY OF LEEP (LOOP ELECTROSURGICAL EXCISION PROCEDURE) OF CERVIX COMPLICATING PREGNANCY IN FIRST TRIMESTER (HCC): Primary | ICD-10-CM

## 2024-11-20 PROBLEM — O26.893 SHORTNESS OF BREATH DUE TO PREGNANCY IN THIRD TRIMESTER (HCC): Status: RESOLVED | Noted: 2023-01-26 | Resolved: 2024-11-20

## 2024-11-20 PROBLEM — N39.3 STRESS INCONTINENCE: Status: ACTIVE | Noted: 2024-11-20

## 2024-11-20 PROBLEM — R06.02 SHORTNESS OF BREATH DUE TO PREGNANCY IN THIRD TRIMESTER (HCC): Status: RESOLVED | Noted: 2023-01-26 | Resolved: 2024-11-20

## 2024-11-20 LAB
APPEARANCE: CLEAR
BILIRUBIN: NEGATIVE
GLUCOSE (URINE DIPSTICK): NEGATIVE MG/DL
KETONES (URINE DIPSTICK): NEGATIVE MG/DL
LEUKOCYTES: NEGATIVE
MULTISTIX LOT#: NORMAL NUMERIC
NITRITE, URINE: NEGATIVE
OCCULT BLOOD: NEGATIVE
PH, URINE: 5 (ref 4.5–8)
PROTEIN (URINE DIPSTICK): NEGATIVE MG/DL
SPECIFIC GRAVITY: 1.01 (ref 1–1.03)
URINE-COLOR: YELLOW
UROBILINOGEN,SEMI-QN: 0.2 MG/DL (ref 0–1.9)

## 2024-11-20 PROCEDURE — 87591 N.GONORRHOEAE DNA AMP PROB: CPT | Performed by: OBSTETRICS & GYNECOLOGY

## 2024-11-20 PROCEDURE — 87661 TRICHOMONAS VAGINALIS AMPLIF: CPT | Performed by: OBSTETRICS & GYNECOLOGY

## 2024-11-20 PROCEDURE — 87491 CHLMYD TRACH DNA AMP PROBE: CPT | Performed by: OBSTETRICS & GYNECOLOGY

## 2024-11-20 RX ORDER — METOCLOPRAMIDE 10 MG/1
10 TABLET ORAL EVERY 6 HOURS PRN
Qty: 20 TABLET | Refills: 0 | Status: SHIPPED | OUTPATIENT
Start: 2024-11-20

## 2024-11-20 NOTE — TELEPHONE ENCOUNTER
Pt wishes / needs the following. Please do referral & send MFM order for:    [ x ]  Genetic Screening    DX:  History of leep, crohn's    [  ]  medical consult    [  ]  16 wk cervical length    [ x ]  level 2 ultrasound    [   ] fetal echo    [  ]  growth ultrasound at 32 wks    [  ]  serial ultrasound    [  ]  NSTs once 36 weeks

## 2024-11-20 NOTE — PROGRESS NOTES
New OB. Pap UTD; GC/C collected. Nausea; Reglan Rx sent. Requesting genetic screening. Stress incontinence; Pelvic Floor PT referral. Irregular cycle 29-44 days.  SLIUP size CWD. RTC 4 wks

## 2024-11-21 LAB
C TRACH DNA SPEC QL NAA+PROBE: NEGATIVE
N GONORRHOEA DNA SPEC QL NAA+PROBE: NEGATIVE

## 2024-11-22 LAB — T VAGINALIS RRNA SPEC QL NAA+PROBE: NEGATIVE

## 2024-12-05 NOTE — TELEPHONE ENCOUNTER
Order signed.  Thanks for pending!   PER PT THINKS SHE MAY HAVE AN YEAST INFECTION / PT ALSO HAS AN COLOPO  PROCEDURE SCHEDULE FOR TOMORROW / PT WANT TO KNOW IF SHE CAN STILL HAVE THE PROCEDURE DONE TOMORROW / PLS ADV

## 2024-12-11 ENCOUNTER — HOSPITAL ENCOUNTER (OUTPATIENT)
Dept: PERINATAL CARE | Facility: HOSPITAL | Age: 34
Discharge: HOME | End: 2024-12-11
Attending: OBSTETRICS & GYNECOLOGY
Payer: COMMERCIAL

## 2024-12-11 ENCOUNTER — LAB ENCOUNTER (OUTPATIENT)
Dept: LAB | Facility: HOSPITAL | Age: 34
End: 2024-12-11
Attending: OBSTETRICS & GYNECOLOGY
Payer: COMMERCIAL

## 2024-12-11 VITALS
DIASTOLIC BLOOD PRESSURE: 73 MMHG | SYSTOLIC BLOOD PRESSURE: 107 MMHG | BODY MASS INDEX: 23 KG/M2 | HEART RATE: 73 BPM | WEIGHT: 129 LBS

## 2024-12-11 DIAGNOSIS — Z36.9 FIRST TRIMESTER SCREENING (HCC): Primary | ICD-10-CM

## 2024-12-11 DIAGNOSIS — Z98.890 HISTORY OF LOOP ELECTRICAL EXCISION PROCEDURE (LEEP): ICD-10-CM

## 2024-12-11 DIAGNOSIS — K50.90 CROHN'S DISEASE WITHOUT COMPLICATION, UNSPECIFIED GASTROINTESTINAL TRACT LOCATION (HCC): ICD-10-CM

## 2024-12-11 DIAGNOSIS — Z34.81 PRENATAL CARE, SUBSEQUENT PREGNANCY, FIRST TRIMESTER (HCC): ICD-10-CM

## 2024-12-11 DIAGNOSIS — O34.41 HISTORY OF LEEP (LOOP ELECTROSURGICAL EXCISION PROCEDURE) OF CERVIX COMPLICATING PREGNANCY IN FIRST TRIMESTER (HCC): ICD-10-CM

## 2024-12-11 DIAGNOSIS — Z36.9 FIRST TRIMESTER SCREENING (HCC): ICD-10-CM

## 2024-12-11 DIAGNOSIS — O09.299 IUGR (INTRAUTERINE GROWTH RESTRICTION) IN PRIOR PREGNANCY, PREGNANT (HCC): ICD-10-CM

## 2024-12-11 DIAGNOSIS — Z98.890 HISTORY OF LEEP (LOOP ELECTROSURGICAL EXCISION PROCEDURE) OF CERVIX COMPLICATING PREGNANCY IN FIRST TRIMESTER (HCC): ICD-10-CM

## 2024-12-11 PROCEDURE — 76813 OB US NUCHAL MEAS 1 GEST: CPT | Performed by: OBSTETRICS & GYNECOLOGY

## 2024-12-11 NOTE — PROGRESS NOTES
Reason for Consult:   Dear  ,    Thank you for requesting ultrasound evaluation and maternal fetal medicine consultation on Viola Parham.  As you are aware she is a 34 year old female with a Choi pregnancy at 12w4d.  A maternal-fetal medicine consultation was requested secondary to  H/O LEEP and h/o IUGR.  Her prenatal records and labs were reviewed.  She denies Crohn's disease.  Review of History:     OB History:    OB History    Para Term  AB Living   5 2 1 1 2 2   SAB IAB Ectopic Multiple Live Births   2 0 0 0 2      # Outcome Date GA Lbr Thaddeus/2nd Weight Sex Type Anes PTL Lv   5 Current            4 Term 03 38w6d 08:05 / 02:24 8 lb 2.5 oz (3.7 kg) F Vag-Vacuum EPI N FLORENTINO      Complications: Late decelerations, Meconium, Fetal intolerance to labor      Name: MIKHAIL,GIRL      Apgar1: 8  Apgar5: 9   3 SAB 22 6w0d   U          Birth Comments: Hcg levels were watched to come down.   Done with CDH    2  09 36w0d  6 lb 2 oz (2.778 kg) M NORMAL SPONT  N FLORENTINO      Birth Comments: Pt was induced d/t placenta growing, baby stopped growing.       Name: Mikey   1 2007 8w0d   U          Birth Comments: D&C d/t no heart tones  Pt states it was in the first trimester early, around 8 weeks.              Allergies:  Allergies[1]   Current Meds:  Current Outpatient Medications   Medication Sig Dispense Refill    metoclopramide (REGLAN) 10 MG Oral Tab Take 1 tablet (10 mg total) by mouth every 6 (six) hours as needed. 20 tablet 0    prenatal vitamin with DHA 27-0.8-228 MG Oral Cap Take 1 capsule by mouth daily.          HISTORY:  Past Medical History:    Abnormal Pap smear of cervix    Anemia    with prev preg.    Chlamydia    Chronic maxillary sinusitis    Crohn disease (HCC)    Pt states that it is gone now and now has IBS    Decorative tattoo    Deviated nasal septum    Diarrhea    probably IBS, was previously dx'ed with Crohn's    Genital herpes simplex     Herpes    HPV in female    Human papilloma virus infection    Hypertrophy of nasal turbinates    Nasal septal deviation    Varicella    chicken pox in childhood    Visual impairment    glasses      Past Surgical History:   Procedure Laterality Date    Colonoscopy  2013    Colonoscopy N/A 2021    Procedure: COLONOSCOPY;  Surgeon: Joey Strong MD;  Location: [facility] ENDOSCOPY    Colposcopy, cervix w/upper adjacent vagina; w/biopsy(s), cervix      D & c      Excision turbinate      Leep      Leep      Nasal scope,bx/rmv polyp/debrid  2018    Nasal scope,bx/rmv polyp/debrid  2020    Nasal scopy,rmv tiss maxill sinus      Nasal scopy,rmv tiss maxill sinus  2018    Repair of nasal septum      Sinus surgery        septoplasty, TBR    Tonsillectomy        Family History   Problem Relation Age of Onset    No Known Problems Father     No Known Problems Mother     No Known Problems Sister     No Known Problems Brother       Social History     Socioeconomic History    Marital status: Single   Tobacco Use    Smoking status: Former     Current packs/day: 0.00     Average packs/day: 0.5 packs/day for 10.0 years (5.0 ttl pk-yrs)     Types: Cigarettes     Start date: 2012     Quit date: 2022     Years since quittin.4    Smokeless tobacco: Former     Quit date: 2024   Vaping Use    Vaping status: Some Days   Substance and Sexual Activity    Alcohol use: Not Currently     Alcohol/week: 1.0 - 2.0 standard drink of alcohol     Types: 1 - 2 Standard drinks or equivalent per week     Comment: 2-3 drinks every weekend before preg    Drug use: No    Sexual activity: Yes     Partners: Male     Birth control/protection: Condom, OCP     Comment: same partner x 2 years   Other Topics Concern    Blood Transfusions No    Caffeine Concern No    Sleep Concern No    Stress Concern Yes    Weight Concern No    Special Diet No    Back Care No    Exercise Yes     Comment: ocassional    Seat Belt Yes    Social History Narrative    Work - bartending, 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     Social Drivers of Health     Financial Resource Strain: Low Risk  (3/4/2023)    Financial Resource Strain     Difficulty of Paying Living Expenses: Not hard at all   Food Insecurity: No Food Insecurity (3/4/2023)    Food Insecurity     Food Insecurity: Never true   Transportation Needs: No Transportation Needs (3/4/2023)    Transportation Needs     Lack of Transportation: No   Stress: No Stress Concern Present (3/4/2023)    Stress     Feeling of Stress : No   Housing Stability: Low Risk  (3/4/2023)    Housing Stability     Housing Instability: No        NARRATIVE:     /73   Pulse 73   Wt 129 lb (58.5 kg)   LMP 09/14/2024 (Approximate)   BMI 22.85 kg/m²           Alert and Oriented.  No acute distress          Abdomen:  soft, nontender, no contractions noted.           extremities:  nontender, no edema            DISCUSSION  During her visit we discussed and reviewed the following issues:    CELL-FREE FETAL DNA   Sequencing cfDNA is a screening test; it has false-positive and negative results and does not test for all genetic syndromes or all aneuploidies: it tests for trisomy 21, 18, and 13 and sometimes sex chromosome aneuploidy. Diagnostic procedures, such as amniocentesis, obtain fetal cells, and subsequent testing by karyotyping or microarray can diagnose all aneuploidies; can distinguish between full trisomy and trisomy caused by an unbalanced chromosomal rearrangement; and can detect mosaicism and microdeletions/microduplications (microarray) and, via amniotic fluid AFP and acetylcholinesterase, open neural tube defects. If there are one or more structural anomalies on ultrasound examination, a microarray on amniocytes is the preferred test    Both the mother and the fetal-placental unit produce cfDNA. The primary source of so-called \"fetal\" cfDNA in the maternal circulation is thought to be apoptosis of  placental cells (syncytiotrophoblast), while maternal hematopoietic cells are the source of most maternal cfDNA. A lesser source is apoptosis of fetal erythroblasts generating cfDNA in the fetal circulation, and these fragments can cross the placenta and enter the maternal circulation. Since the fetus and the placenta originate from a single fertilized egg, they are usually genetically identical, but differences between the placenta and fetus are important sources of discordant cfDNA test results (eg, confined placental mosaicism).    Trisomy 21, 18, and 13 -- cfDNA is the most sensitive screening option for these aneuploidies. Performance varies by trisomy and by methodology but is rather similar in both high- and low-risk women. Based on multiple meta-analyses, the consensus DRs and FPRs were as follows:  ?Trisomy 21 - DR 99.5 percent, FPR 0.05 percent   ?Trisomy 18 - DR 97.7 percent, FPR 0.04 percent   ?Trisomy 13 - DR 96.1 percent, FPR 0.06 percent       Low cell fraction  Cell-free DNA test failures may occur because of the complex laboratory processing procedures, early gestational age (less than 9-10 weeks), the types of laboratory methods, and the presence of a genetic condition, particularly trisomy 13 or 18 and are also seen more frequently in patients with high BMI, increasing maternal age, certain racial backgrounds (seen more frequently in Black women and South  women in comparison to white women), and IVF pregnancies, 45 as well as maternal drug exposure (low-molecular-weight heparin)  Some aneuploidies also have a low cell fraction.    1 to 5 percent of cfDNA tests do not yield a result, and obese women are at increased risk of receiving a test failure.  The patient has three options in this setting:  Repeat the cfDNA test, if allowed by the laboratory (some failures, like large regions of homozygosity or dizygotic twins, will always cause the test to fail and repeat testing is not an option).  Repeat testing, when allowed, is successful in 50 to 80 percent of cases .  Standard serum marker/ultrasound screening.  Invasive procedure (amniocentesis, CVS) and diagnostic testing (karyotyping/microarray).       False-positive cell-free DNA test results occur because of confined placental mosaicism, which can be associated with an increased risk of fetal growth restriction. High or low fetal fraction has been associated with adverse pregnancy outcomes in some studies.    ------    1st pregnancy induced at 36wks due to IUGR.  Different father with 2nd and baby was > 8 lbs.     Fetal growth restriction (FGR, also called intrauterine growth restriction [IUGR]) is the term used to designate a fetus that has not reached its growth potential because of genetic or environmental factors. It may be caused by fetal, placental, or maternal factors, with significant overlap among these entities.   The susceptibility to FGR is also heritable; in epidemiologic studies, women who were SGA themselves at birth have a two-fold increase in risk of FGR in their offspring. Women who give birth to a growth restricted fetus are at high risk of recurrence, and the risk increases with increasing numbers of FGR deliveries.  Choi pregnancies conceived via assisted reproductive technologies have a higher prevalence of both low birth weight and SGA infants compared with naturally conceived pregnancies.   Karyotypic abnormalities account for up to 20 percent of all FGR.    Maternal medical disorders may cause FGR as a result of diminished uteroplacental blood flow from faulty development, acquired obstruction, or disruption of the uteroplacental vasculature. Examples of these disorders include hypertension, renal insufficiency, diabetes, collagen vascular disease, systemic lupus erythematosus, antiphospholipid syndrome, and preeclampsia.    -----------  LEEP x 3:   prior to last pregnancy    Previous cervical conization is associated  with an increased risk of second trimester pregnancy loss. The best available data are from a population-based study which compared 15,108 births in women who had previously undergone cervical conization (cold knife, laser, or LEEP) with two other data sets: 2,164,006 births in women who had no BESSY treatment and 57,136 births in women who underwent cervical conization after their index pregnancy. The risk of delivery at <24 weeks of gestation was significantly higher in women with prior conization compared with either those who subsequently underwent conization or had no BESSY treatment (1.5 versus 0.4 percent).   The risk of PPROM was significantly increased in women who had previously undergone LEEP compared with those who had no BESSY treatment (5 versus 2 percent). Laser conization and ablation were not associated with a significant increase in PPROM frequency.        The risk of  delivery following LEEP is controversial because some studies did not find an increased  risk of  mortality and  delivery.  Greater depth of excision (cone depth of 10 mm up the cervical canal) appears to be an independent risk factor for  birth and PPROM in subsequent pregnancies, based on three observational studies of LEEP or laser conization.         We discussed her history of LEEP and potential impact on her risk for  delivery.  Surgical history is not as predictive as cervical length for  delivery.  Recent research as found that cervical length of 25 mm or less in the second trimester is associated with a higher risk for  delivery and the shorter the cervix the higher the risk.  Transvaginal ultrasound cervical length assessment is the best method for assessing cervical length, although transabdominal cervical length >36 mm correlates well with a transvaginal measurement greater than 25 mm.    Screening cervical length is clinically useful since there are medical and surgical  treatments to help reduce  delivery in women with short cervix.  Daily vaginal use of 200 mg micronized progesterone has been shown to reduce the risk of  delivery by ~ 40% in women with a vasquez pregnancy and ultrasound finding of short cervix with or without a history of  delivery.  Cervical cerclage is indicated in patients with prior  delivery and short cervix and in some patients with very short cervix.  The data in twin pregnancies is not as promising but one study did show a trend toward lower  delivery with vaginal progesterone use.             OB ULTRASOUND REPORT   See imaging tab for complete ultrasound report     Fetal Heart Rate: Present 178 bpm  Fetal Presentation: Transverse Left  Amniotic fluid MVP: WNL  Placental Location:  right lateral      FIRST TRIMESTER SCREENING:    The CRL is consistent with the gestational age.  The nuchal translucency measures  1.1   mm. This is within normal limits.  The nasal bone is present.  Fetus: arms and legs seen suboptimally. Fetal head/skull and abdomen appeared normal. Stomach and bladder seen.   Uterus and adnexa appeared normal        IMPRESSION:   1. IUP @  12w4d  2. Scan consistent with dates  3. No fetal structural abnormalities seen but limited by early gestational age  4.  H/o IUGR  5.  H/o LEEP      RECOMMENDATIONS:   1.  Level 2 US at 20wks  2.  Growth US at 32wks    Thank you for allowing me to participate in the care of your patient.  Please do not hesitate to call with any questions or concerns.     Total time spent   40  minutes this calendar day which includes preparing to see the patient including chart review, obtaining and/or reviewing additional medical history, performing a physical exam and evaluation, documenting clinical information in the electronic medical record, independently interpreting results, counseling the patient, communicating results to the patient/family/caregiver and coordinating  care.    Jose Jones D.O.  Maternal Fetal Medicine     Note to patient and family:  The 21st Century Cures Act makes medical notes available to patients in the interest of transparency.  However, please be advised that this is a medical document.  It is intended as a peer to peer communication.  It is written in medical language and may contain abbreviations or verbiage that are technical and unfamiliar.  It may appear blunt or direct.  Medical documents are intended to carry relevant information, facts as evident, and the clinical opinion of the practitioner.         [1] No Known Allergies

## 2024-12-15 LAB
GESTATIONAL AGE > OR = 9W:: YES
MONOSOMY X (TURNER SYNDROME): NOT DETECTED
TEST RESULT: NEGATIVE
TRISOMY 13 (PATAU SYNDROME): NEGATIVE
TRISOMY 18 (EDWARDS SYNDROME): NEGATIVE
TRISOMY 21 (DOWN SYNDROME): NEGATIVE
XXX (TRIPLE X SYNDROME): NOT DETECTED
XXY (KLINEFELTER SYNDROME): NOT DETECTED
XYY (JACOBS SYNDROME): NOT DETECTED

## 2024-12-17 ENCOUNTER — ROUTINE PRENATAL (OUTPATIENT)
Dept: OBGYN CLINIC | Facility: CLINIC | Age: 34
End: 2024-12-17

## 2024-12-17 VITALS
HEART RATE: 73 BPM | BODY MASS INDEX: 23 KG/M2 | WEIGHT: 129.19 LBS | DIASTOLIC BLOOD PRESSURE: 62 MMHG | SYSTOLIC BLOOD PRESSURE: 97 MMHG

## 2024-12-17 DIAGNOSIS — Z34.92 ENCOUNTER FOR SUPERVISION OF NORMAL PREGNANCY IN SECOND TRIMESTER, UNSPECIFIED GRAVIDITY (HCC): Primary | ICD-10-CM

## 2024-12-17 DIAGNOSIS — N39.3 STRESS INCONTINENCE: ICD-10-CM

## 2024-12-17 PROBLEM — Z86.59 HISTORY OF POSTPARTUM DEPRESSION, CURRENTLY PREGNANT (HCC): Status: ACTIVE | Noted: 2024-12-17

## 2024-12-17 PROBLEM — Z13.79 GENETIC SCREENING: Status: RESOLVED | Noted: 2022-08-31 | Resolved: 2024-12-17

## 2024-12-17 PROBLEM — Z98.890 HISTORY OF CERVICAL LEEP BIOPSY AFFECTING CARE OF MOTHER, ANTEPARTUM (HCC): Status: ACTIVE | Noted: 2024-12-17

## 2024-12-17 PROBLEM — Z72.0 TOBACCO USE: Status: RESOLVED | Noted: 2020-06-24 | Resolved: 2024-12-17

## 2024-12-17 PROBLEM — O99.891 HISTORY OF POSTPARTUM DEPRESSION, CURRENTLY PREGNANT (HCC): Status: ACTIVE | Noted: 2024-12-17

## 2024-12-17 PROBLEM — Z98.890 HISTORY OF LOOP ELECTRICAL EXCISION PROCEDURE (LEEP): Status: RESOLVED | Noted: 2018-04-16 | Resolved: 2024-12-17

## 2024-12-17 PROBLEM — Z34.90 PREGNANT (HCC): Status: RESOLVED | Noted: 2023-03-04 | Resolved: 2024-12-17

## 2024-12-17 PROBLEM — O34.40 HISTORY OF CERVICAL LEEP BIOPSY AFFECTING CARE OF MOTHER, ANTEPARTUM (HCC): Status: ACTIVE | Noted: 2024-12-17

## 2024-12-17 LAB
APPEARANCE: CLEAR
BILIRUBIN: NEGATIVE
GLUCOSE (URINE DIPSTICK): NEGATIVE MG/DL
KETONES (URINE DIPSTICK): NEGATIVE MG/DL
MULTISTIX LOT#: ABNORMAL NUMERIC
NITRITE, URINE: NEGATIVE
OCCULT BLOOD: NEGATIVE
PH, URINE: 6.5 (ref 4.5–8)
PROTEIN (URINE DIPSTICK): NEGATIVE MG/DL
SPECIFIC GRAVITY: 1 (ref 1–1.03)
URINE-COLOR: YELLOW
UROBILINOGEN,SEMI-QN: 0.2 MG/DL (ref 0–1.9)

## 2024-12-17 PROCEDURE — 3078F DIAST BP <80 MM HG: CPT | Performed by: OBSTETRICS & GYNECOLOGY

## 2024-12-17 PROCEDURE — 81002 URINALYSIS NONAUTO W/O SCOPE: CPT | Performed by: OBSTETRICS & GYNECOLOGY

## 2024-12-17 PROCEDURE — 3074F SYST BP LT 130 MM HG: CPT | Performed by: OBSTETRICS & GYNECOLOGY

## 2025-01-13 ENCOUNTER — ROUTINE PRENATAL (OUTPATIENT)
Dept: OBGYN CLINIC | Facility: CLINIC | Age: 35
End: 2025-01-13

## 2025-01-13 VITALS
DIASTOLIC BLOOD PRESSURE: 63 MMHG | SYSTOLIC BLOOD PRESSURE: 99 MMHG | HEART RATE: 74 BPM | WEIGHT: 130.81 LBS | BODY MASS INDEX: 23 KG/M2

## 2025-01-13 DIAGNOSIS — Z34.91 ENCOUNTER FOR SUPERVISION OF NORMAL PREGNANCY IN FIRST TRIMESTER, UNSPECIFIED GRAVIDITY (HCC): Primary | ICD-10-CM

## 2025-01-13 LAB
APPEARANCE: CLEAR
BILIRUBIN: NEGATIVE
GLUCOSE (URINE DIPSTICK): NEGATIVE MG/DL
KETONES (URINE DIPSTICK): NEGATIVE MG/DL
LEUKOCYTES: NEGATIVE
MULTISTIX LOT#: NORMAL NUMERIC
NITRITE, URINE: NEGATIVE
OCCULT BLOOD: NEGATIVE
PH, URINE: 7 (ref 4.5–8)
PROTEIN (URINE DIPSTICK): NEGATIVE MG/DL
SPECIFIC GRAVITY: 1.01 (ref 1–1.03)
URINE-COLOR: YELLOW
UROBILINOGEN,SEMI-QN: 0.2 MG/DL (ref 0–1.9)

## 2025-01-13 PROCEDURE — 3078F DIAST BP <80 MM HG: CPT | Performed by: OBSTETRICS & GYNECOLOGY

## 2025-01-13 PROCEDURE — 81002 URINALYSIS NONAUTO W/O SCOPE: CPT | Performed by: OBSTETRICS & GYNECOLOGY

## 2025-01-13 PROCEDURE — 3074F SYST BP LT 130 MM HG: CPT | Performed by: OBSTETRICS & GYNECOLOGY

## 2025-02-03 NOTE — PROGRESS NOTES
Reason for Consult:   Dear  ,    Thank you for requesting ultrasound evaluation and maternal fetal medicine consultation on Viola Parham.  As you are aware she is a 34 year old female with a Choi pregnancy  with Estimated Date of Delivery: 25 .  A maternal-fetal medicine f/u is today. Her prenatal records and labs were reviewed.      No new changes.    Review of History:     OB History:    OB History    Para Term  AB Living   5 2 1 1 2 2   SAB IAB Ectopic Multiple Live Births   2 0 0 0 2      # Outcome Date GA Lbr Thaddeus/2nd Weight Sex Type Anes PTL Lv   5 Current            4 Term 23 38w6d 08:05 / 02:24 8 lb 2.5 oz (3.7 kg) F Vag-Vacuum EPI N FLORENTINO      Complications: Late decelerations, Meconium, Fetal intolerance to labor      Name: MIKHAIL,GIRL      Apgar1: 8  Apgar5: 9   3 SAB 22 6w0d   U          Birth Comments: Hcg levels were watched to come down.   Done with CDH    2  09 36w0d  6 lb 2 oz (2.778 kg) M NORMAL SPONT  N FLORENTINO      Birth Comments: Pt was induced d/t placenta growing, baby stopped growing.       Name: Mikey   1 2007 8w0d   U          Birth Comments: D&C d/t no heart tones  Pt states it was in the first trimester early, around 8 weeks.              Allergies:  Allergies[1]   Current Meds:  Current Outpatient Medications   Medication Sig Dispense Refill    metoclopramide (REGLAN) 10 MG Oral Tab Take 1 tablet (10 mg total) by mouth every 6 (six) hours as needed. (Patient not taking: Reported on 2025) 20 tablet 0    prenatal vitamin with DHA 27-0.8-228 MG Oral Cap Take 1 capsule by mouth daily.          HISTORY:  Past Medical History:    Abnormal Pap smear of cervix    Anemia    with prev preg.    Chlamydia    Chronic maxillary sinusitis    Crohn disease (HCC)    Pt states that it is gone now and now has IBS    Decorative tattoo    Deviated nasal septum    Diarrhea    probably IBS, was previously dx'ed with Crohn's    Genital  herpes simplex    Herpes    HPV in female    Human papilloma virus infection    Hypertrophy of nasal turbinates    Nasal septal deviation    Varicella    chicken pox in childhood    Visual impairment    glasses      Past Surgical History:   Procedure Laterality Date    Colonoscopy  2013    Colonoscopy N/A 2021    Procedure: COLONOSCOPY;  Surgeon: Joey Strong MD;  Location: [facility] ENDOSCOPY    Colposcopy, cervix w/upper adjacent vagina; w/biopsy(s), cervix      D & c      Excision turbinate      Leep      Leep      Nasal scope,bx/rmv polyp/debrid  2018    Nasal scope,bx/rmv polyp/debrid  2020    Nasal scopy,rmv tiss maxill sinus      Nasal scopy,rmv tiss maxill sinus  2018    Repair of nasal septum      Sinus surgery        septoplasty, TBR    Tonsillectomy        Family History   Problem Relation Age of Onset    No Known Problems Father     No Known Problems Mother     No Known Problems Sister     No Known Problems Brother       Social History     Socioeconomic History    Marital status: Single   Tobacco Use    Smoking status: Former     Current packs/day: 0.00     Average packs/day: 0.5 packs/day for 10.0 years (5.0 ttl pk-yrs)     Types: Cigarettes     Start date: 2012     Quit date: 2022     Years since quittin.5    Smokeless tobacco: Former     Quit date: 2024   Vaping Use    Vaping status: Some Days   Substance and Sexual Activity    Alcohol use: Not Currently     Alcohol/week: 1.0 - 2.0 standard drink of alcohol     Types: 1 - 2 Standard drinks or equivalent per week     Comment: 2-3 drinks every weekend before preg    Drug use: No    Sexual activity: Yes     Partners: Male     Birth control/protection: Condom, OCP     Comment: same partner x 2 years   Other Topics Concern    Blood Transfusions No    Caffeine Concern No    Sleep Concern No    Stress Concern Yes    Weight Concern No    Special Diet No    Back Care No    Exercise Yes     Comment: ocassional     Seat Belt Yes   Social History Narrative    Work - bartending, 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     Social Drivers of Health     Financial Resource Strain: Low Risk  (3/4/2023)    Financial Resource Strain     Difficulty of Paying Living Expenses: Not hard at all   Food Insecurity: No Food Insecurity (3/4/2023)    Food Insecurity     Food Insecurity: Never true   Transportation Needs: No Transportation Needs (3/4/2023)    Transportation Needs     Lack of Transportation: No   Stress: No Stress Concern Present (3/4/2023)    Stress     Feeling of Stress : No   Housing Stability: Low Risk  (3/4/2023)    Housing Stability     Housing Instability: No        NARRATIVE:     /69   Pulse 78   Wt 136 lb (61.7 kg)   LMP 09/14/2024 (Approximate)   BMI 24.09 kg/m²           Alert and Oriented.  No acute distress          Abdomen:  soft, nontender, no contractions noted.               DISCUSSION  During her visit we discussed and reviewed the following issues:       ------    1st pregnancy induced at 36wks due to IUGR.  Different father with 2nd and baby was > 8 lbs.     Please see previous [facility] detailed discussion.     -----------  LEEP x 3:   prior to last pregnancy     Please see previous [facility] detailed discussion.         OB ULTRASOUND REPORT   See imaging tab for complete ultrasound report     Ultrasound Findings:  Single IUP in cephalic presentation.    Placenta is posterior, fundal.   A 3 vessel cord is noted.  Cardiac activity is present at 143 bpm   g ( 0 lb 12 oz);   MVP is 6.5 cm .     The fetal measurements are consistent with the established EDC. No ultrasound evidence of structural abnormalities are seen today. The nasal bone is present. No ultrasound evidence of markers for aneuploidy are seen. She understands that ultrasound exam cannot exclude genetic abnormalities and that genetic testing is recommended. The limitations of ultrasound were discussed.     Uterus and adnexa appeared normal   today on US      IMPRESSION:   1. IUP @  20w2d   2. Scan consistent with dates  3. No fetal structural abnormalities seen   4.  H/o IUGR  5.  H/o LEEP      RECOMMENDATIONS:   1. .  Growth US at 32wks    Thank you for allowing me to participate in the care of your patient.  Please do not hesitate to call with any questions or concerns.     Total time spent   20  minutes this calendar day which includes preparing to see the patient including chart review, obtaining and/or reviewing additional medical history, performing a physical exam and evaluation, documenting clinical information in the electronic medical record, independently interpreting results, counseling the patient, communicating results to the patient/family/caregiver and coordinating care.    Shonda Ewing MD   Maternal Fetal Medicine     Note to patient and family:  The 21st Century Cures Act makes medical notes available to patients in the interest of transparency.  However, please be advised that this is a medical document.  It is intended as a peer to peer communication.  It is written in medical language and may contain abbreviations or verbiage that are technical and unfamiliar.  It may appear blunt or direct.  Medical documents are intended to carry relevant information, facts as evident, and the clinical opinion of the practitioner.         [1] No Known Allergies

## 2025-02-20 NOTE — PROGRESS NOTES
MUSCULOSKELETAL AND PELVIC FLOOR EVALUATION:     Diagnosis:   Stress incontinence (N39.3) Patient:  Viola Thomas (34 year old, female)        Referring Provider: Liana Simon  Today's Date   2025    Precautions:  -- (pregnancy)   Date of Evaluation: 25  Next MD visit: not scheduled yet  Date of Surgery: n/a     PATIENT SUMMARY   Summary of chief complaints: AMENA, R hip pain  History of current condition: Pt reports this is 3rd pregnancy, with every pregnancy, anytime she has to sneeze, cough, dry heave, she will leak and have to change clothes. If not pregnant she does have leakage with jumping, but no so much with sneeze/cough/laugh. Pt has 2 prior vaginal deliveries, had to have episiotomy, vacuum.   Pain level: current 3 /10, at best 3 /10, at worst 10 /10  Description of symptoms: R low back/hip pain, achy, sharp   Occupation: part-time,  1x/wk, [employer], babysits   Leisure activities/Hobbies: 10,000 steps per day, yoga, walking outside when it's nice out   Prior level of function: no leakage with sneeze, cough, laugh outside of pregnancy, some leakage with jumping prior  Current limitations: inability to hold back urine consistently with sneeze, cough, laugh  Pt goals: decreased leakage, hoping for successful vaginal delivery  Drinks 1.5 gallons of water per day  Hx of hemorrhoids  Sometimes discomfort with sex    Bowel:  Going daily  BSS #4  No straining, uses stool  Magnesium    Pregnant Now: Yes   OB History    Para Term  AB Living   5 2 1 1 2 2   SAB IAB Ectopic Multiple Live Births   2 0 0 0 2      # Outcome Date GA Lbr Thaddeus/2nd Weight Sex Type Anes PTL Lv   5 Current            4 Term / 38w6d 08:05 / 02:24 8 lb 2.5 oz F Vag-Vacuum EPI N FLORENTINO      Complications: Late decelerations, Meconium, Fetal intolerance to labor      Name: MIKHAIL,GIRL      Apgar1: 8  Apgar5: 9   3 SAB 22 6w0d   U          Birth Comments: Hcg levels were watched to come down.    Done with [facility]    2  09 36w0d  6 lb 2 oz M NORMAL SPONT  N FLORENTINO      Birth Comments: Pt was induced d/t placenta growing, baby stopped growing.       Name: Mikey Bradford 2007 8w0d   U          Birth Comments: D&C d/t no heart tones  Pt states it was in the first trimester early, around 8 weeks.      Urodynamic Test: no   Manometry: no   PFDI 20    Scores   POPDI 6:   0 45.83   CRAD 8:   0 43.75   ZIGGY 6:   0 50   Summary:   0 139.58      Outpatient Therapy Pelvic Health Intake       Question 2025 10:29 AM CST - Filed by Patient    Do you usually experience pressure in the lower abdomen? Somewhat    Do you usually experience heaviness or dullness in the pelvic area? Somewhat    Do you usually have a bulge or something falling out that you can see or feel in your vaginal area? Not at all    Do you ever have to push on the vagina or around the rectum to have or complete a bowel movement? Somewhat    Do you usually experience a feeling of incomplete bladder emptying? Quite a bit    Do you ever have to push up on a bulge in the vaginal area with your fingers to start or complete urination? Not present    Please provide details on the following urinary history / complaints     Frequency of urination: # of times/day 10    Frequency of urination: # of times/night 2    When you have the urge to urinate, how long can you delay before you have to go to the toilet? (# of minutes or hours) a few minutes    Do you have a history of urinary tract infections: Yes    Do you have a history of urine loss (select all that apply) after childbirth    How many times a day does leakage occur? 3    If you have leakage, what type(s) of protective device(s) do you use? (Select all that apply) None    On average, how many pads do you use each day? 0    Do you soak the pad fully? No    Do you change the pad each time it is wet? Yes    Do you experience any of these symptoms? (Select all that apply) Experience an urge to  urinate when you hear running water     Have to strain to empty your bladder     Dribble after you empty your bladder    Do you usually experience frequent urination? Quite a bit    Do you usually experience urine leakage associated with a feeling of urgency, that is, a strong sensation of needing to go to the bathroom? Not at all    Do you usually experience urine leakage related to coughing, sneezing, or laughing? Quite a bit    Do you usually experience small amounts of urine leakage (that is, drops)? Not present    Do you usually experience difficulty empyting your bladder? Moderately    Do you usually experience pain or discomfort in the lower abdomen or genital region? Not present    Have you ever had any of the following treatment:     Have you ever done exercises to control urine loss? If so, for how long? no    Has your doctor ever prescribed any medication for urine loss? No    Have you had any surgical procedures to treat urine loss? No    Please provide details on the following bowel history / complaints.     How many bowel movements do you have per day? 1    How many bowel movements do you have per night? 0    Do you experience diarrhea? If yes, how often? no    Do you feel you need to strain too hard to have a bowel movement? Somewhat    Do you feel you have not completely emptied your bowels at the end of a bowel movement? Somewhat    Do you usually lose stool beyond your control if your stool is well formed? Not at all    Do you usually lose stool beyond your control if your stool is loose? Somewhat    Do you usually lose gas from the rectum beyond your control? Somewhat    Do you usually have pain when you pass your stool? Not at all    Do you experience strong sense of urgency and have to rush to bathroom for bowel movement? Somewhat    Does part of bowel ever pass through rectum and bulge outside during/after bowel movement? Somewhat    Please provide details on your daily fluid/food intake.      On average, what is your daily fluid intake (1 glass=8ounces)? (# of glasses per day) 20    How many are caffeinated? (# of glasses per day) 1    Do you restrict fluids because of incontenence (leakage)? No    Do you include fiber in your diet (fruits, vegetables, bran, etc.)? Yes    Psychosocial information     Do you live alone? No    Which recreational activities do you participate in if any? walking yoga    Have you had to restrict your activities due to your pelvic health concerns? No    On a scale of 0 (no impairments) to 10 (severe impairments), what are your feelings about your urinary or bowel incontinence or pelvic pain? 5    Do you have pain with intercourse? Yes    Have you had any changes in intimate relationships/sexual function due to your symptoms?  No    Your personal safety is of utmost importance [facility], please answer the following questions:     Are you being hurt, frightened, demeaned, or taken advantage of by anyone at your home or in your life?  No    Have you recently had thoughts of hurting yourself? No    Have you tried to hurt yourself in the past?  No    Pelvic Organ Prolapse Distress Inventory 6 Score (range: 0 - 100) 45.83    Colorectal-Anal Distress Inventory 8 Score (range: 0 - 100) 43.75    Urinary Distress Inventory 6 Score (range: 0 - 100) 50    PFDI Summary Score (range: 0 - 300) 139.58            Past medical history was reviewed with Viola.  Significant findings include: complicated vaginal delivery of 2nd child, episiotomy and vacuum required  Imaging/Tests: russell Rod  has a past medical history of Abnormal Pap smear of cervix, Anemia, Chlamydia, Chronic maxillary sinusitis, Crohn disease (HCC), Decorative tattoo, Deviated nasal septum, Diarrhea, Genital herpes simplex, Herpes, HPV in female, Human papilloma virus infection, Hypertrophy of nasal turbinates, Nasal septal deviation, Varicella, and Visual impairment.  She  has a past surgical history  that includes colonoscopy (02/2013); repair of nasal septum; excision turbinate; nasal scopy,rmv tiss maxill sinus; sinus surgery  ; nasal scopy,rmv tiss maxill sinus (11/09/2018); nasal scope,bx/rmv polyp/debrid (11/09/2018); nasal scope,bx/rmv polyp/debrid (09/18/2020); tonsillectomy; colonoscopy (N/A, 04/29/2021); colposcopy, cervix w/upper adjacent vagina; w/biopsy(s), cervix; leep (2014); leep; and d & c.    ASSESSMENT  Viola presents to physical therapy evaluation with primary c/o AMENA, R hip pain. The results of the objective tests and measures show pt has symptoms with severe distress per PFDI20 score, some hip weakness noted and decreased hip rotation range due to muscle tightness. Functional deficits include but are not limited to inability to hold back urine consistently with sneeze, cough, laugh. Signs and symptoms are consistent with diagnosis of Stress incontinence (N39.3). Pt and PT discussed evaluation findings, pathology, POC and HEP.  Pt voiced understanding and performs HEP correctly without reported pain. Skilled Physical Therapy is medically necessary to address the above impairments and reach functional goals.    OBJECTIVE:   Musculoskeletal  Posture: Posture: grossly WNL   Pelvic Alignment: WNL    Special Tests: none    Informed consent for internal pelvic evaluation given:No   Deferred internal assessment at this time, will trial addressing issues external assessment only at this time, if it is determined at a future date that pt would benefit from internal PFM assessment, would receive medical clearance from ref provider, pt verbalized understanding and in agreement with this plan.      GOKUL ROM and Strength below:  (* denotes performed with pain)  Hip   ROM MMT (-/5)    R L R L     Flex (L2)     4 4     Ext      not tested not tested     Abd     4 4     ER     4- 4     IR     4 4-       Flexibility:  LE Flexibility R L     Hip Flexor         Hamstrings mild fritz mild fritz     ITB          Piriformis   mod fritz     Quads         Gastroc-soleus             Balance and Functional Mobility  Gait: pt ambulates on level ground with normal mechanics.  SLS EO: R 10 SEC    L 10 SEC   Squat: grossly WNL, mild R hip drift      Today's Treatment and Response:   Pt education was provided on exam findings, treatment diagnosis, treatment plan, expectations, and prognosis.  Today's Treatment       2/19/2025   PT Treatment   Therapeutic Exercise Seated fig 4 stretch R/L x30 sec  Seated hamstring stretch R/L x30 sec  Sidelying clamshell R/L x10  Seated SL IR with yoga block between knees R/L x5 ea  Cat/cow x5  Quadruped alt lat flex x5   Therapeutic Exercise Min 15   Evaluation Min 30   Total of Timed Procedures 15   Total of Service Based 30   Total Treatment Time 45          No data to display                 Patient was instructed in and issued a HEP for: Access Code: [email or web address]  URL: [email or web address]  Date: 02/19/2025  Prepared by: Kary Porod    Exercises  - Seated Piriformis Stretch with Trunk Bend  - 3-4 x daily - 7 x weekly - 1 sets - 1 reps - 30 sec hold  - Seated Hamstring Stretch  - 3-4 x daily - 7 x weekly - 1 sets - 1 reps - 30 sec hold  - Clamshell  - 1 x daily - 7 x weekly - 2 sets - 10 reps  - Quadruped Cat Cow  - 1 x daily - 7 x weekly - 1 sets - 5 reps  - Quadruped Thoracic Lumbar Side Bend  - 1 x daily - 7 x weekly - 1 sets - 5 reps    Charges:  PT EVAL: Low Complexity, 1 TE  In agreement with evaluation findings and clinical rationale, this evaluation involved LOW COMPLEXITY decision making due to no personal factors/comorbidities, 1-2 body structures involved/activity limitations, and stable symptoms as documented in the evaluation.                                                                       PLAN OF CARE:    Goals: (to be met in 10 visits)   Goals       Therapy Goals      Not Met Progress  Toward Partially Met Met   Patient will improve B hip strength to 4+/5 in all  planes in order to better support SL stance functional activities like gait and stairs, with decreased demand of PFMs.  [] [] [] []   Patient will demonstrate decreased AMENA by 75% in order to be able to better handle changes in IAP with functional tasks and exercise recommendations for pregnancy.  [] [] [] []   Patient will report use of the KNACK at least 75% of the time to mitigate AMENA with increases in IAP and to restore cough reflex. [] [] [] []   Patient will report adherence to HEP for continued exercise benefits following cessation of PT. [] [] [] []                   Frequency / Duration: Patient will be seen 1x/week or a total of 10  visits over a 90 day period. Treatment will include: Gait training; Manual Therapy; Neuromuscular Re-education; Self-Care Home Management; Therapeutic Activities; Therapeutic Exercise; Home Exercise Program instruction    Education or treatment limitation: None   Rehab Potential: good         Patient/Family/Caregiver was advised of these findings, precautions, and treatment options and has agreed to actively participate in planning and for this course of care.    Thank you for your referral. Please co-sign or sign and return this letter via fax as soon as possible to [phone]. If you have any questions, please contact me at Dept: [phone]    Sincerely,  Electronically signed by therapist: Kary Ma PT  Physician's certification required: Yes  I certify the need for these services furnished under this plan of treatment and while under my care.    X___________________________________________________ Date____________________    Certification From: 2/20/2025  To:5/21/2025

## 2025-02-25 NOTE — PROGRESS NOTES
Patient: Viola Thomas (34 year old, female) Referring Provider:  Insurance:   Diagnosis: Stress incontinence (N39.3) Liana Simon  [facility] HMO   Date of Surgery: n/a Next MD visit:  N/A   Precautions:  -- (pregnancy) not scheduled yet Referral Information:    Date of Evaluation: Req: 8, Auth: 8, Exp: 4/30/2025 02/19/25 POC Auth Visits:  8 (2/17/25 - 4/30/25)       Today's Date   2/25/2025    Subjective  Pt reports she is doing HEP, everything feels good expcept the seated fig 4 stretch is getting tighter on the R side.       Pain: 1/10 (still gets intense shooting pain once in a while)     Objective  see chart below             Assessment  Pt with some difficulty on exercises that required increased balance with simultaneous core work like birddog and pelvic tilts on SB, so will benefit from continued work on core stabilization. Pt with some B hip add tightness and will benefit from lengthening to indirectly address pelvic floor issues.    Goals (to be met in 10 visits)   Goals       Therapy Goals      Not Met Progress  Toward Partially Met Met   Patient will improve B hip strength to 4+/5 in all planes in order to better support SL stance functional activities like gait and stairs, with decreased demand of PFMs.  [] [x] [] []   Patient will demonstrate decreased AMENA by 75% in order to be able to better handle changes in IAP with functional tasks and exercise recommendations for pregnancy.  [] [x] [] []   Patient will report use of the KNACK at least 75% of the time to mitigate AMENA with increases in IAP and to restore cough reflex. [] [x] [] []   Patient will report adherence to HEP for continued exercise benefits following cessation of PT. [] [x] [] []                  Plan  B hip mobility and strengthening    Treatment Last 4 Visits       2/19/2025 2/25/2025   PT Treatment   Treatment Day  2   Therapeutic Exercise Seated fig 4 stretch R/L x30 sec  Seated hamstring stretch R/L x30 sec  Sidelying clamshell  R/L x10  Seated SL IR with yoga block between knees R/L x5 ea  Cat/cow x5  Quadruped alt lat flex x5 Supine:  Butterfly stretch + 5 belly breaths/5 ribcage breaths  Bridge + exhale x10  Alt marching + exhale x10  Alt BKFOs x10    Sidelying:  Clams R/L x10  SL hip abd R/L x10  SL rainbow abd R/L x10    Seated:  SL hip IR yoga block between knees R/L x10ea, B x10  SB hip add stretch R/L x30 sec  SB pelvic tilts 2x5  Pelvic tilts seated on mat table x10    Quadruped:  Alt hip ext x10  Birddog x5 (unstable)  Fire hydrant x10   Therapeutic Exercise Min 15 40   Evaluation Min 30    Total of Timed Procedures 15 40   Total of Service Based 30 0   Total Treatment Time 45 40         HEP  Access Code: [email or web address]  URL: [email or web address]  Date: 02/25/2025  Prepared by: Kary Porod    Exercises  - Seated Hamstring Stretch  - 3-4 x daily - 7 x weekly - 1 sets - 1 reps - 30 sec hold  - Clamshell  - 1 x daily - 7 x weekly - 2 sets - 10 reps  - Quadruped Cat Cow  - 1 x daily - 7 x weekly - 1 sets - 5 reps  - Quadruped Thoracic Lumbar Side Bend  - 1 x daily - 7 x weekly - 1 sets - 5 reps  - Sidelying Hip Abduction  - 1 x daily - 7 x weekly - 1 sets - 10 reps  - Beginner Front Arm Support  - 1 x daily - 7 x weekly - 1 sets - 10 reps  - Diaphragmatic Breathing in Child's Pose with Pelvic Floor Relaxation  - 1 x daily - 7 x weekly - 1 sets - 10 reps    Charges     3 TE

## 2025-03-03 NOTE — PROGRESS NOTES
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 Got a body pillow, and feels like that is helping. Today feeling really good, even with walking a lot yesterday, wore SI jt belt. Feels like she is not leaking as much with sneezing, still sometimes, but not every time.       Pain: 1/10     Objective  see chart below         Assessment  Pt with improved control on exericses today, still needed occasional cues for stability of pelvis with quadruped and standing exercises. Added SL stance and BKFOs with TB to HEP today to continue working on pelvic stability. Continued stabilization exercises today to help with pain management and gait normalization for return to ADLs with minimal to no pain.    Goals (to be met in 10 visits)   Goals       Therapy Goals      Not Met Progress  Toward Partially Met Met   Patient will improve B hip strength to 4+/5 in all planes in order to better support SL stance functional activities like gait and stairs, with decreased demand of PFMs.  [] [x] [] []   Patient will demonstrate decreased AMENA by 75% in order to be able to better handle changes in IAP with functional tasks and exercise recommendations for pregnancy.  [] [x] [] []   Patient will report use of the KNACK at least 75% of the time to mitigate AMENA with increases in IAP and to restore cough reflex. [] [x] [] []   Patient will report adherence to HEP for continued exercise benefits following cessation of PT. [] [x] [] []                  Plan  B hip mobility and strengthening    Treatment Last 4  Visits       2/19/2025 2/25/2025 3/3/2025   PT Treatment   Treatment Day  2 3   Therapeutic Exercise Seated fig 4 stretch R/L x30 sec  Seated hamstring stretch R/L x30 sec  Sidelying clamshell R/L x10  Seated SL IR with yoga block between knees R/L x5 ea  Cat/cow x5  Quadruped alt lat flex x5 Supine:  Butterfly stretch + 5 belly breaths/5 ribcage breaths  Bridge + exhale x10  Alt marching + exhale x10  Alt BKFOs x10    Sidelying:  Clams R/L x10  SL hip abd R/L x10  SL rainbow abd R/L x10    Seated:  SL hip IR yoga block between knees R/L x10ea, B x10  SB hip add stretch R/L x30 sec  SB pelvic tilts 2x5  Pelvic tilts seated on mat table x10    Quadruped:  Alt hip ext x10  Birddog x5 (unstable)  Fire hydrant x10 Supine:  Butterfly stretch + 5 belly breaths/5 ribcage breaths  Bridge + exhale x10  Pelvic tilt to curl up/down x10  Alt marching + exhale red TB x10  Alt BKFOs x10     Sidelying:  Clams R/L red TB R/L x10*  SL hip abd R/L x10  SL rainbow abd R/L x10     Quadruped:  Alt hip ext x10  Cat cow x10  C-spine x10    Standing:  SL balance R/L 30\" x3 ea   Therapeutic Exercise Min 15 40 43   Evaluation Min 30     Total of Timed Procedures 15 40 43   Total of Service Based 30 0 0   Total Treatment Time 45 40 43         HEP  Access Code: [email or web address]  URL: [email or web address]  Date: 03/03/2025  Prepared by: Kary Porod    Exercises  - Seated Hamstring Stretch  - 3-4 x daily - 7 x weekly - 1 sets - 1 reps - 30 sec hold  - Bent Knee Fallouts  - 1 x daily - 7 x weekly - 1 sets - 10 reps  - Clamshell  - 1 x daily - 7 x weekly - 1 sets - 10 reps  - Quadruped Cat Cow  - 1 x daily - 7 x weekly - 1 sets - 5 reps  - Quadruped Thoracic Lumbar Side Bend  - 1 x daily - 7 x weekly - 1 sets - 5 reps  - Sidelying Hip Abduction  - 1 x daily - 7 x weekly - 1 sets - 10 reps  - Beginner Front Arm Support  - 1 x daily - 7 x weekly - 1 sets - 10 reps  - Diaphragmatic Breathing in Child's Pose with Pelvic Floor Relaxation  -  1 x daily - 7 x weekly - 1 sets - 10 reps  - Single Leg Stance  - 1 x daily - 7 x weekly - 1 sets - 3 reps - 30 sec hold    Charges     3 TE

## 2025-03-12 NOTE — PROGRESS NOTES
Patient: Viola Thomas (34 year old, female) Referring Provider:  Insurance:   Diagnosis: Stress incontinence (N39.3) Liana Simon  [place]   Date of Surgery: n/a Next MD visit:  N/A   Precautions:  -- (pregnancy) not scheduled yet Referral Information:    Date of Evaluation: Req: 8, Auth: 8, Exp: 4/30/2025 02/19/25 POC Auth Visits:  8       Today's Date   3/12/2025    Subjective  Pt had a very busy week, did exercises 1-2x, had 1 big sneeze and had a big episode of leakage. 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 SI jt pain has been better.       Pain: 0/10     Objective  see chart below         Assessment  Focused on increasing load and progression to SL exercises this session. Pt with improved form and glute engagement with shoes off during squats. Upgraded HEP. Pt will continue to benefit from strengthening.    Goals (to be met in 10 visits)      Not Met Progress  Toward Partially Met Met   Patient will improve B hip strength to 4+/5 in all planes in order to better support SL stance functional activities like gait and stairs, with decreased demand of PFMs.  []  [x]  []  []    Patient will demonstrate decreased AMENA by 75% in order to be able to better handle changes in IAP with functional tasks and exercise recommendations for pregnancy.  []  [x]  []  []    Patient will report use of the KNACK at least 75% of the time to mitigate AMENA with increases in IAP and to restore cough reflex. []  [x]  []  []    Patient will report adherence to HEP for continued exercise benefits following cessation of PT. []  [x]  []  []         Plan  B hip mobility and strengthening    Treatment Last 4 Visits       2/19/2025 2/25/2025 3/3/2025 3/12/2025   PT Treatment   Treatment Day  2 3 4   Therapeutic Exercise Seated fig 4 stretch R/L x30 sec  Seated hamstring stretch R/L x30 sec  Sidelying clamshell R/L x10  Seated SL IR with yoga block between knees R/L x5 ea  Cat/cow x5  Quadruped alt  lat flex x5 Supine:  Butterfly stretch + 5 belly breaths/5 ribcage breaths  Bridge + exhale x10  Alt marching + exhale x10  Alt BKFOs x10    Sidelying:  Clams R/L x10  SL hip abd R/L x10  SL rainbow abd R/L x10    Seated:  SL hip IR yoga block between knees R/L x10ea, B x10  SB hip add stretch R/L x30 sec  SB pelvic tilts 2x5  Pelvic tilts seated on mat table x10    Quadruped:  Alt hip ext x10  Birddog x5 (unstable)  Fire hydrant x10 Supine:  Butterfly stretch + 5 belly breaths/5 ribcage breaths  Bridge + exhale x10  Pelvic tilt to curl up/down x10  Alt marching + exhale red TB x10  Alt BKFOs x10     Sidelying:  Clams R/L red TB R/L x10*  SL hip abd R/L x10  SL rainbow abd R/L x10     Quadruped:  Alt hip ext x10  Cat cow x10  C-spine x10    Standing:  SL balance R/L 30\" x3 ea    Therapeutic Exercise Min 15 40 43    Evaluation Min 30      Total of Timed Procedures 15 40 43    Total of Service Based 30 0 0    Total Treatment Time 45 40 43           2/25/2025 3/3/2025 3/12/2025   Pelvic Treatment   Treatment Day 2 3 4   Therapeutic Exercise   Supine:   Butterfly stretch + 5 belly breaths/5 ribcage breaths   Pelvic tilt to curl up/down x10   Alt marching to LE ext to march, x12  Alt BKFOs x12      Sidelying:   Clams R/L red TB R/L 2x10*   SL hip abd R/L x10   SL rainbow abd R/L x10      Quadruped:   Alt hip ext x10   Birddog x10    Standing:   Squat to target 10# x10  Squat to 10# db lift from 8\" surface height shoes on x9, shoes off x10   SL balance R/L 1 min ea  Step-up: forward, lateral, R/L x8 ea    Reviewed HEP     Therapeutic Exercise Minutes   40   Total Time Of Timed Procedures   40   Total Time Of Service-Based Procedures   0   Total Treatment Time   40   HEP   Access Code: [email or web address]  URL: [email or web address]  Date: 03/12/2025  Prepared by: Kary Ma    Exercises  - Seated Hamstring Stretch  - 3-4 x daily - 7 x weekly - 1 sets - 1 reps - 30 sec hold  - Quadruped Cat Cow  - 1 x daily - 7 x  weekly - 1 sets - 5 reps  - Quadruped Thoracic Lumbar Side Bend  - 1 x daily - 7 x weekly - 1 sets - 5 reps  - Diaphragmatic Breathing in Child's Pose with Pelvic Floor Relaxation  - 1 x daily - 7 x weekly - 1 sets - 10 reps  - Sidelying Hip Abduction  - 1 x daily - 7 x weekly - 2 sets - 10 reps  - Bird Dog  - 1 x daily - 7 x weekly - 1 sets - 10 reps  - Single Leg Stance  - 1 x daily - 3 x weekly - 1 sets - 3 reps - 30 sec hold  - Step Up  - 1 x daily - 3 x weekly - 1 sets - 10 reps  - Goblet Squat with Kettlebell  - 1 x daily - 3 x weekly - 2 sets - 10 reps        HEP  Access Code: [email or web address]  URL: [email or web address]  Date: 03/12/2025  Prepared by: Kary Porod    Exercises  - Seated Hamstring Stretch  - 3-4 x daily - 7 x weekly - 1 sets - 1 reps - 30 sec hold  - Quadruped Cat Cow  - 1 x daily - 7 x weekly - 1 sets - 5 reps  - Quadruped Thoracic Lumbar Side Bend  - 1 x daily - 7 x weekly - 1 sets - 5 reps  - Diaphragmatic Breathing in Child's Pose with Pelvic Floor Relaxation  - 1 x daily - 7 x weekly - 1 sets - 10 reps  - Sidelying Hip Abduction  - 1 x daily - 7 x weekly - 2 sets - 10 reps  - Bird Dog  - 1 x daily - 7 x weekly - 1 sets - 10 reps  - Single Leg Stance  - 1 x daily - 3 x weekly - 1 sets - 3 reps - 30 sec hold  - Step Up  - 1 x daily - 3 x weekly - 1 sets - 10 reps  - Goblet Squat with Kettlebell  - 1 x daily - 3 x weekly - 2 sets - 10 reps    Charges  3 TE

## 2025-03-18 NOTE — PROGRESS NOTES
Patient: Viola Thomas (34 year old, female) Referring Provider:  Insurance:   Diagnosis:   Liana Simon  [employer]   Date of Surgery: No data recorded Next MD visit:  N/A   Precautions:    No data recorded Referral Information:    Date of Evaluation: Req: 8, Auth: 8, Exp: 4/30/2025    No data recorded POC Auth Visits:  8        Today's Date   3/18/2025    Subjective  Pt has been feeling ok, only a little leakage occasionally. Pain has been better all week, only noticing if she is laying for a while with no pillow between the knees she will have some.       Pain: 0/10     Objective  see chart below       Assessment  Pt tolerating increased reps/sets on standing exercises without increased SI jt pain. Pt doing well and will benefit from decreased frequency of PT to every other week in order to work toward indep symptom management with HEP. Pt agreeable to plan.    Goals (to be met in 10 visits)      Not Met Progress  Toward Partially Met Met   Patient will improve B hip strength to 4+/5 in all planes in order to better support SL stance functional activities like gait and stairs, with decreased demand of PFMs.  []  [x]  []  []    Patient will demonstrate decreased AMENA by 75% in order to be able to better handle changes in IAP with functional tasks and exercise recommendations for pregnancy.  []  []  [x]  []    Patient will report use of the KNACK at least 75% of the time to mitigate AMENA with increases in IAP and to restore cough reflex. []  [x]  []  []    Patient will report adherence to HEP for continued exercise benefits following cessation of PT. []  []  [x]  []         Plan  B hip mobility and strengthening    Treatment Last 4 Visits       2/25/2025 3/3/2025 3/12/2025 3/18/2025   PT Treatment   Treatment Day 2 3 4 5   Therapeutic Exercise Supine:  Butterfly stretch + 5 belly breaths/5 ribcage breaths  Bridge + exhale x10  Alt marching + exhale x10  Alt BKFOs x10    Sidelying:  Clams R/L x10  SL hip abd R/L  x10  SL rainbow abd R/L x10    Seated:  SL hip IR yoga block between knees R/L x10ea, B x10  SB hip add stretch R/L x30 sec  SB pelvic tilts 2x5  Pelvic tilts seated on mat table x10    Quadruped:  Alt hip ext x10  Birddog x5 (unstable)  Fire hydrant x10 Supine:  Butterfly stretch + 5 belly breaths/5 ribcage breaths  Bridge + exhale x10  Pelvic tilt to curl up/down x10  Alt marching + exhale red TB x10  Alt BKFOs x10     Sidelying:  Clams R/L red TB R/L x10*  SL hip abd R/L x10  SL rainbow abd R/L x10     Quadruped:  Alt hip ext x10  Cat cow x10  C-spine x10    Standing:  SL balance R/L 30\" x3 ea     Therapeutic Exercise Min 40 43     Total of Timed Procedures 40 43     Total of Service Based 0 0     Total Treatment Time 40 43            2/25/2025 3/3/2025 3/12/2025 3/18/2025   Pelvic Treatment   Treatment Day 2 3 4 5   Therapeutic Exercise   Supine:   Butterfly stretch + 5 belly breaths/5 ribcage breaths   Pelvic tilt to curl up/down x10   Alt marching to LE ext to march, x12  Alt BKFOs x12      Sidelying:   Clams R/L red TB R/L 2x10*   SL hip abd R/L x10   SL rainbow abd R/L x10      Quadruped:   Alt hip ext x10   Birddog x10    Standing:   Squat to target 10# x10  Squat to 10# db lift from 8\" surface height shoes on x9, shoes off x10   SL balance R/L 1 min ea  Step-up: forward, lateral, R/L x8 ea    Reviewed HEP      Therapeutic Exercise Minutes   40    Total Time Of Timed Procedures   40    Total Time Of Service-Based Procedures   0    Total Treatment Time   40    HEP   Access Code: [email or web address]  URL: [email or web address]  Date: 03/12/2025  Prepared by: Kary Ma    Exercises  - Seated Hamstring Stretch  - 3-4 x daily - 7 x weekly - 1 sets - 1 reps - 30 sec hold  - Quadruped Cat Cow  - 1 x daily - 7 x weekly - 1 sets - 5 reps  - Quadruped Thoracic Lumbar Side Bend  - 1 x daily - 7 x weekly - 1 sets - 5 reps  - Diaphragmatic Breathing in Child's Pose with Pelvic Floor Relaxation  - 1 x daily - 7  x weekly - 1 sets - 10 reps  - Sidelying Hip Abduction  - 1 x daily - 7 x weekly - 2 sets - 10 reps  - Bird Dog  - 1 x daily - 7 x weekly - 1 sets - 10 reps  - Single Leg Stance  - 1 x daily - 3 x weekly - 1 sets - 3 reps - 30 sec hold  - Step Up  - 1 x daily - 3 x weekly - 1 sets - 10 reps  - Goblet Squat with Kettlebell  - 1 x daily - 3 x weekly - 2 sets - 10 reps         HEP  No changes    Charges  3 TE

## 2025-03-25 NOTE — TELEPHONE ENCOUNTER
Patient went to the lab yesterday to do her glucose screening and was told that the order is not in place yet. Please call.

## 2025-03-25 NOTE — TELEPHONE ENCOUNTER
Patient informed orders have been placed.  Patient also states she needed to cancel her appointment on 4/7.  The PSR could not find any openings that week so patient was told if she was comfortable skipping appointment that's fine.  Patient informed that we do not want patient skipping appointments.  Appointment booked on 4/9.

## 2025-03-28 NOTE — PROGRESS NOTES
Pt advised of results and recommendations for slow Fe and CBC one month after starting. Also informed she did not pass the 1 hr gtt and next step is to do the 3 hr gtt. Provided CS# and fasting instructions.

## 2025-04-02 NOTE — TELEPHONE ENCOUNTER
28w4d - Viola calling with increased vaginal discharge that is yellow/green with small clumps.   She denies foul odor, but states smells kind of stale. She denies itching.   Otherwise +fetal movement, denies vaginal bleeding, leakage of fluid or contractions.   Scheduled 4/4 with Lavern Raya for vaginal culture. Patient verbalized understanding.

## 2025-04-04 NOTE — PROGRESS NOTES
She reports noticing yellow green discharge with some green clumps \"dried boogers\" on panty liner. Stale odor, no itching no urinary symptoms. +fetal movement, occasional tierra montoya. Had intercourse 6 days ago,    Yeast on speculum exam - clotrimazole  A+   Desires tdap next visit  Doing 3 hr gtt in 4 days  Growth ultrasound scheduled. Rto 2 weeks. Kick counts and sx of PTL reviewed

## 2025-04-08 NOTE — TELEPHONE ENCOUNTER
Pt informed of CAPs recs. Pt stated that she has been doing slow Fe BID for the last 2 weeks. States she takes her first iron supp in the AM, then her PNV in the afternoon, and then the second iron supp in the evening.     Pt stated she is supposed to have repeat CBC around 4/23. Message to CAP if OK to wait til pts repeat CBC in 2 weeks for further recs and continue iron BID at this time?

## 2025-04-08 NOTE — TELEPHONE ENCOUNTER
----- Message from Jazmyne MCKINNEY Page sent at 4/8/2025  4:03 PM CDT -----  Patient needs to start slow fe bid- at different times from her pnv.

## 2025-04-11 NOTE — TELEPHONE ENCOUNTER
Patient informed of Dr. Chu's response.  Patient will continue iron BID and have blood drawn on 4/23.

## 2025-04-16 NOTE — TELEPHONE ENCOUNTER
[facility] notified we have not received breast pump order. Provided fax numbers. They will send now.

## 2025-04-16 NOTE — TELEPHONE ENCOUNTER
Received a written order request from [facility] for a breast pump. Stamped/dated and faxed back. Fax confirmation received sent to scanning

## 2025-04-22 NOTE — PROGRESS NOTES
Clinic Care Coordination Contact  Care Coordination Transition Communication    Referral Source: IP Report    Clinical Data: Patient was hospitalized at [facility] from 1/15/19 to 1/20/19 with diagnosis of chest discomfort and SOB with exertion. Patient has discharge diagnoses of : 1. Mitral Regurgitation (mild)  2. Acute on Chronic Diastolic Heart Failure  3. Hypertension  4. Rheumatoid Arthritis  5. RA-associated ILD  6. Pulmonary fibrosis  7. Paroxysmal atrial fibrillation s/p multiple cardioversions  8. Osteoporosis  9 Macular Degeneration  10. CKD  11. Hypothyroidism    Transition to Facility:              Facility Name: [facility]              Contact name and phone number/fax: Valery wynne LAURA at [phone]    Plan: RN/SW Care Coordinator will await notification from facility staff informing RN/SW Care Coordinator of patient's discharge plans/needs. RN/SW Care Coordinator will review chart and outreach to facility staff every 4 weeks and as needed.     Cintia Alves RN, CCM - Care Coordinator     1/21/2019    8:27 AM  [phone]     Outpatient Maternal-Fetal Medicine Follow-Up    Dear Dr. Monique    Thank you for requesting an ultrasound and maternal-fetal medicine consultation on your patient Viola Thomas.  As you are aware she is a 34 year old female  with a vasquez pregnancy and an Estimated Date of Delivery: 25.  She returned to maternal-fetal medicine today for a follow-up visit.  Her history was reviewed from her prior visit and there were no interval changes.    Antepartum Risk Factors   H/o IUGR  H/o LEEP    PHYSICAL EXAMINATION:  /65   Pulse 94   Wt 153 lb (69.4 kg)   LMP 2024 (Approximate)   BMI 27.10 kg/m²   General: alert and oriented, no acute distress  Abdomen: gravid, soft, non-tender  Extremities: non-tender, no edema    OBSTETRIC ULTRASOUND  The patient had a follow-up growth ultrasound today which revealed normal interval fetal growth, BPP 8/8.    Ultrasound Findings:  Single IUP in cephalic presentation.    Placenta is posterior.   A 3 vessel cord is noted.  Cardiac activity is present at 141 bpm  EFW 2098 g ( 4 lb 10 oz); 55%.    GINA is  17.2 cm.  MVP is 5.7 cm  BPP is 8/8.     The fetal measurements are consistent with established EDC. No gross ultrasound evidence of structural abnormalities are seen today. The patient understands that ultrasound cannot rule out all structural and chromosomal abnormalities.     See Imaging Tab For Complete Ultrasound Report  I interpreted the results and reviewed them with the patient.    DISCUSSION  During her visit we discussed and reviewed the following issues:  H/O IUGR  AGA  growth    IMPRESSION:  IUP at 32w3d  H/o IUGR  H/o LEEP    RECOMMENDATIONS:  Continue care with Dr. Monique  Routine antepartum care    Thank you for allowing me to participate in the care of your patient.  Please do not hesitate to contact me if additional questions or concerns arise.      Oskar Pringle M.D.    20 minutes spent in review of records, patient consultation,  documentation and coordination of care.  The relevant clinical matter(s) are summarized above.     Note to patient and family  The 21st Century Cures Act makes medical notes available to patients in the interest of transparency.  However, please be advised that this is a medical document.  It is intended as [id number]-hb-pwff communication.  It is written and medical language may contain abbreviations or verbiage that are technical and unfamiliar.  It may appear blunt or direct.  Medical documents are intended to carry relevant information, facts as evident, and the clinical opinion of the practitioner.

## 2025-04-24 NOTE — TELEPHONE ENCOUNTER
Patient needs prenatal appts in 2-weeks, but the soonest on a Mon/Wed in [facility] afternoon is 6/9.    Pls advise

## 2025-04-29 NOTE — PROGRESS NOTES
Patient: Viola Thomas (34 year old, female) Referring Provider:  Insurance:   Diagnosis: Stress incontinence (N39.3) [facility]   Date of Surgery: No data recorded Next MD visit:  N/A   Precautions:  -- (pregnancy 3rd trimester) No data recorded Referral Information:    Date of Evaluation: Req: 13, Auth: 13, Exp: 6/30/2025    No data recorded POC Auth Visits:  8        Today's Date   4/28/2025    Subjective  Pt been feeling ok, SI jt pain not bad, pt is off work now. Pt has occasional AMENA with sneeze/cough, not every time.       Pain: 0/10     Objective  see flowsheet below            Assessment  Pt required some modifications on HEP to better manage discomfort with a few exercises. Overall pt's pain has been decreased, may be due to combination of improved sleeping position, decreased standing due to taking time off her 2 jobs at end of pregnancy, and increased walking and activity throughout the day. Pt may benefit from re-assessment of HEP modifications at next session, ed on labor prep stratgegies, and functional strengthening for postpartum.    Goals (to be met in 10 visits)        Not Met Progress  Toward Partially Met Met   Patient will improve B hip strength to 4+/5 in all planes in order to better support SL stance functional activities like gait and stairs, with decreased demand of PFMs.  []  [x]  []  []    Patient will demonstrate decreased AMENA by 75% in order to be able to better handle changes in IAP with functional tasks and exercise recommendations for pregnancy.  []  []  [x]  []    Patient will report use of the KNACK at least 75% of the time to mitigate AMENA with increases in IAP and to restore cough reflex. []  [x]  []  []    Patient will report adherence to HEP for continued exercise benefits following cessation of PT. []  []  [x]  []             Plan  B hip mobility and strengthening, labor position ed    Treatment Last 4 Visits  Treatment Day: 6       3/12/2025 3/18/2025  4/28/2025   Pelvic Treatment   Therapeutic Exercise Supine:   Butterfly stretch + 5 belly breaths/5 ribcage breaths   Pelvic tilt to curl up/down x10   Alt marching to LE ext to march, x12  Alt BKFOs x12      Sidelying:   Clams R/L red TB R/L 2x10*   SL hip abd R/L x10   SL rainbow abd R/L x10      Quadruped:   Alt hip ext x10   Birddog x10    Standing:   Squat to target 10# x10  Squat to 10# db lift from 8\" surface height shoes on x9, shoes off x10   SL balance R/L 1 min ea  Step-up: forward, lateral, R/L x8 ea    Reviewed HEP   Supine:   Butterfly stretch + 5 belly breaths/5 ribcage breaths   Pelvic tilt to curl up/down x10   Alt marching to LE ext to march, x10  Alt BKFOs x10*     Sidelying:   Clams R/L + IR R/L x10  SL hip abd R/L x10   SL rainbow abd R/L x10      Quadruped:   Birddog x12    Standing:   Squat to target 10# x10 (shoes off)   Deadlift 10lb bar 2x10  SL balance R/L 1 min ea  Step-up: forward, lateral, R/L x10 ea (pain in knee with forward) Cat cow x10  Hip hinge x10  C-spine x10  Bird dog x10  Sidelying hip abd with UE ball press R/L x10ea  Half kneel to tall kneel with ball press x10  Toney carry 80' 15lbs R/L  Modified sit>stand squat + exhale 15lbs chest height x10, SA R x5, SA L x5 (pain on R), 10lbs SA R x5, SA L x5 (better)     Therapeutic Activity   Pt ed on IR/ER for pelvic outlet/inlet opening in quadruped   Therapeutic Exercise Minutes 40 40 40   Therapeutic Activity Minutes   3   Total Time Of Timed Procedures 40 40 43   Total Time Of Service-Based Procedures 0 0 0   Total Treatment Time 40 40 43   HEP Access Code: [email or web address]  URL: [email or web address]  Date: 03/12/2025  Prepared by: Kary Ma    Exercises  - Seated Hamstring Stretch  - 3-4 x daily - 7 x weekly - 1 sets - 1 reps - 30 sec hold  - Quadruped Cat Cow  - 1 x daily - 7 x weekly - 1 sets - 5 reps  - Quadruped Thoracic Lumbar Side Bend  - 1 x daily - 7 x weekly - 1 sets - 5 reps  - Diaphragmatic Breathing in  Child's Pose with Pelvic Floor Relaxation  - 1 x daily - 7 x weekly - 1 sets - 10 reps  - Sidelying Hip Abduction  - 1 x daily - 7 x weekly - 2 sets - 10 reps  - Bird Dog  - 1 x daily - 7 x weekly - 1 sets - 10 reps  - Single Leg Stance  - 1 x daily - 3 x weekly - 1 sets - 3 reps - 30 sec hold  - Step Up  - 1 x daily - 3 x weekly - 1 sets - 10 reps  - Goblet Squat with Kettlebell  - 1 x daily - 3 x weekly - 2 sets - 10 reps No changes Access Code: [email or web address]  URL: [email or web address]  Date: 04/28/2025  Prepared by: Kary Porod    Exercises  - Seated Hamstring Stretch  - 3-4 x daily - 7 x weekly - 1 sets - 1 reps - 30 sec hold  - Quadruped Cat Cow  - 1 x daily - 7 x weekly - 1 sets - 5 reps  - Quadruped Thoracic Lumbar Side Bend  - 1 x daily - 7 x weekly - 1 sets - 5 reps  - Hip Hinge Rock Back  - 1 x daily - 7 x weekly - 3 sets - 10 reps  - Sidelying Hip Abduction  - 1 x daily - 3 x weekly - 1 sets - 10 reps  - Bird Dog  - 1 x daily - 3 x weekly - 1 sets - 10 reps  - Tall Kneeling Hip Hinge  - 1 x daily - 3 x weekly - 1 sets - 10 reps  - Diaphragmatic Breathing to Reduce Intra-abdominal Pressure: Sit to Stand  - 1 x daily - 3 x weekly - 2-3 sets - 10 reps        HEP  Access Code: [email or web address]  URL: [email or web address]  Date: 04/28/2025  Prepared by: Kary Porod    Exercises  - Seated Hamstring Stretch  - 3-4 x daily - 7 x weekly - 1 sets - 1 reps - 30 sec hold  - Quadruped Cat Cow  - 1 x daily - 7 x weekly - 1 sets - 5 reps  - Quadruped Thoracic Lumbar Side Bend  - 1 x daily - 7 x weekly - 1 sets - 5 reps  - Hip Hinge Rock Back  - 1 x daily - 7 x weekly - 3 sets - 10 reps  - Sidelying Hip Abduction  - 1 x daily - 3 x weekly - 1 sets - 10 reps  - Bird Dog  - 1 x daily - 3 x weekly - 1 sets - 10 reps  - Tall Kneeling Hip Hinge  - 1 x daily - 3 x weekly - 1 sets - 10 reps  - Diaphragmatic Breathing to Reduce Intra-abdominal Pressure: Sit to Stand  - 1 x daily - 3 x weekly - 2-3 sets -  10 reps    Charges  3 TE

## 2025-04-29 NOTE — TELEPHONE ENCOUNTER
32w3d. Viola calling about Ferritin and H/H. She would like to address abnormal results. Informed hgb is 11.6, but still w/in normal range and office does not treat until under 11. Informed RDW/RDW-SD mild elevation is d/t stress response from pregnancy and will right itself after delivery. Ferritin is low at 3, informed will route to MD on-call for review and recommendations if needed. Informed may not recommend interventions since hbg was within normal range for pregnancy. She states understanding.     To Dr. Chu on-call, please review and advise. Thank you.

## 2025-05-05 NOTE — PROGRESS NOTES
Normal growth on 4/29/2025 with Maternal Fetal Medicine. +fetal movement.  Denies lof or bleeding. Occasional Damaso montoya  Hgb improved, continue iron  Rto 2 weeks  Reviewed kick counts, sx of labor.  Declined tdap

## 2025-05-05 NOTE — PROGRESS NOTES
The following individual(s) verbally consented to be recorded using ambient AI listening technology and understand that they can each withdraw their consent to this listening technology at any point by asking the clinician to turn off or pause the recording:    Patient name: Viola Coon William

## 2025-05-06 NOTE — TELEPHONE ENCOUNTER
The low ferritin level just tells us the cause for her anemia.  She needs to continue to take her vitamins daily

## 2025-05-14 NOTE — PROGRESS NOTES
Discharge Summary  Pt has attended 7 visits in Physical Therapy.     Patient: Viola Thomas (34 year old, female) Referring Provider:  Insurance:   Diagnosis: Stress incontinence (N39.3) [facility]   Date of Surgery: No data recorded Next MD visit:  N/A   Precautions:  -- (pregnancy 3rd trimester) No data recorded Referral Information:    Date of Evaluation: Req: 13, Auth: 13, Exp: 6/30/2025    No data recorded POC Auth Visits:  13      Today's Date   5/14/2025    Subjective  Pt feeling ready for discharge. SI joints are feeling good. Has not been having pelvic pain. No urinary leakage. Has been consistent with exercises, feels like they are helping. Started to have some tierra montoya contractions here and there. Has noticed she has been nesting.        Pain: 0/10     Objective  see flowsheet below       Assessment   Pt has met all PT goals at this time and is ready for discharge.     Goals (to be met in 10 visits)        Not Met Progress  Toward Partially Met Met   Patient will improve B hip strength to 4+/5 in all planes in order to better support SL stance functional activities like gait and stairs, with decreased demand of PFMs.  []  []  []  [x]    Patient will demonstrate decreased AMENA by 75% in order to be able to better handle changes in IAP with functional tasks and exercise recommendations for pregnancy.  []  []  []  [x]    Patient will report use of the KNACK at least 75% of the time to mitigate AMENA with increases in IAP and to restore cough reflex. []  []  []  [x]    Patient will report adherence to HEP for continued exercise benefits following cessation of PT. []  []  []  [x]         Plan  discharge    Treatment Last 4 Visits  Treatment Day: 7       3/12/2025 3/18/2025 4/28/2025 5/14/2025   Pelvic Treatment   Therapeutic Exercise Supine:   Butterfly stretch + 5 belly breaths/5 ribcage breaths   Pelvic tilt to curl up/down x10   Alt marching to LE ext to march, x12  Alt BKFOs x12       Sidelying:   Clams R/L red TB R/L 2x10*   SL hip abd R/L x10   SL rainbow abd R/L x10      Quadruped:   Alt hip ext x10   Birddog x10    Standing:   Squat to target 10# x10  Squat to 10# db lift from 8\" surface height shoes on x9, shoes off x10   SL balance R/L 1 min ea  Step-up: forward, lateral, R/L x8 ea    Reviewed HEP   Supine:   Butterfly stretch + 5 belly breaths/5 ribcage breaths   Pelvic tilt to curl up/down x10   Alt marching to LE ext to march, x10  Alt BKFOs x10*     Sidelying:   Clams R/L + IR R/L x10  SL hip abd R/L x10   SL rainbow abd R/L x10      Quadruped:   Birddog x12    Standing:   Squat to target 10# x10 (shoes off)   Deadlift 10lb bar 2x10  SL balance R/L 1 min ea  Step-up: forward, lateral, R/L x10 ea (pain in knee with forward) Cat cow x10  Hip hinge x10  C-spine x10  Bird dog x10  Sidelying hip abd with UE ball press R/L x10ea  Half kneel to tall kneel with ball press x10  Toney carry 80' 15lbs R/L  Modified sit>stand squat + exhale 15lbs chest height x10, SA R x5, SA L x5 (pain on R), 10lbs SA R x5, SA L x5 (better)      Therapeutic Activity   Pt ed on IR/ER for pelvic outlet/inlet opening in quadruped Reviewed progress toward goals  Pt ed on mobility strategies for early labor  Reviewed HEP     Therapeutic Exercise Minutes 40 40 40    Therapeutic Activity Minutes   3 15   Total Time Of Timed Procedures 40 40 43 15   Total Time Of Service-Based Procedures 0 0 0 0   Total Treatment Time 40 40 43 15   HEP Access Code: [email or web address]  URL: [email or web address]  Date: 03/12/2025  Prepared by: Kary Ma    Exercises  - Seated Hamstring Stretch  - 3-4 x daily - 7 x weekly - 1 sets - 1 reps - 30 sec hold  - Quadruped Cat Cow  - 1 x daily - 7 x weekly - 1 sets - 5 reps  - Quadruped Thoracic Lumbar Side Bend  - 1 x daily - 7 x weekly - 1 sets - 5 reps  - Diaphragmatic Breathing in Child's Pose with Pelvic Floor Relaxation  - 1 x daily - 7 x weekly - 1 sets - 10 reps  - Sidelying  Hip Abduction  - 1 x daily - 7 x weekly - 2 sets - 10 reps  - Bird Dog  - 1 x daily - 7 x weekly - 1 sets - 10 reps  - Single Leg Stance  - 1 x daily - 3 x weekly - 1 sets - 3 reps - 30 sec hold  - Step Up  - 1 x daily - 3 x weekly - 1 sets - 10 reps  - Goblet Squat with Kettlebell  - 1 x daily - 3 x weekly - 2 sets - 10 reps No changes Access Code: [email or web address]  URL: [email or web address]  Date: 04/28/2025  Prepared by: Kary Ma    Exercises  - Seated Hamstring Stretch  - 3-4 x daily - 7 x weekly - 1 sets - 1 reps - 30 sec hold  - Quadruped Cat Cow  - 1 x daily - 7 x weekly - 1 sets - 5 reps  - Quadruped Thoracic Lumbar Side Bend  - 1 x daily - 7 x weekly - 1 sets - 5 reps  - Hip Hinge Rock Back  - 1 x daily - 7 x weekly - 3 sets - 10 reps  - Sidelying Hip Abduction  - 1 x daily - 3 x weekly - 1 sets - 10 reps  - Bird Dog  - 1 x daily - 3 x weekly - 1 sets - 10 reps  - Tall Kneeling Hip Hinge  - 1 x daily - 3 x weekly - 1 sets - 10 reps  - Diaphragmatic Breathing to Reduce Intra-abdominal Pressure: Sit to Stand  - 1 x daily - 3 x weekly - 2-3 sets - 10 reps No changes        HEP  No changes    Charges  1 TA         Plan: Discharge with HEP. Patient was instructed to follow up with their physician should an exacerbation of symptoms arise.     Thank you for your referral. If you have any questions, please contact me at Dept: [phone].    Sincerely,  Electronically signed by therapist: Kary Ma PT     Physician's certification required:  No

## 2025-05-30 NOTE — TELEPHONE ENCOUNTER
Patient needs appointment the week of the 9th for 38 week ,   Also asking about medication that is to be at pharmacy

## 2025-05-30 NOTE — TELEPHONE ENCOUNTER
36w6d- [facility] states Dr. Adan mentioned he would send prescription-strength hemorrhoid cream to pharmacy.   Advised I will touch base with him in office and call her back.    She also needs 38wk prenatal appointment week of 6/9. No openings, but looks like there are slots on hold 6/11.   Advised I will hold message for Monday to ask supervisor about using slots to add her. Patient verbalized understanding.     To supervisor- are we able to use [place] slot with Dr. Adan on 6/11 or are providers willing to convert [facility] for add ons?

## 2025-05-30 NOTE — TELEPHONE ENCOUNTER
GEGE from Dr. Adan for anusol 2.5% cream- apply TID.  Per Dr. Adan, he recommends applying 1 inch of cream directly to a tucks pad, then leave in place TID.    Viola notified of instructions. Prescription sent.

## 2025-06-02 NOTE — PROGRESS NOTES
Group beta strep today. We'll have to send labor instructions from Nurses. We have no copy at [place].

## 2025-06-02 NOTE — TELEPHONE ENCOUNTER
PSR called back and states patient accepts appointment on 6/11 at 6 pm with Dr. Adan. PSR is not able to add patient due to slot is on hold. Informed PSR, nurse will have someone remove the hold and book the appointment for patient.

## 2025-06-04 NOTE — TRIAGE
[facility]  part of [facility]      Triage Note    Viola Thomas Patient Status:  Outpatient    1990 MRN [id number]   Location [facility] Attending Shira Monique MD   Hosp Day # 0 PCP Samantha Jordan MD      Para:   Estimated Date of Delivery: 25  Gestation: 37w4d    Chief Complaint    R/o Rom         Allergies:  Allergies[1]    Orders Placed This Encounter   Procedures    POCT Ferning       Lab Results   Component Value Date    WBC 7.6 2025    HGB 11.2 (L) 2025    HCT 34.8 (L) 2025    .0 (L) 2025    CREATSERUM 0.52 (L) 2023    BUN 7 2023     2023    K 3.7 2023     2023    CO2 20.0 (L) 2023     (H) 2023    CA 8.7 2023    ALB 2.8 (L) 2023    ALKPHO 108 (H) 2023    BILT 0.3 2023    TP 6.6 2023    AST 7 (L) 2023    ALT 12 (L) 2023    T4F 1.1 2020    TSH 1.940 2020     2021    DDIMER 0.92 (H) 2023    MG 2.5 2020    TROPHS 5 2023    B12 780 2020       Clinitek UA  Lab Results   Component Value Date    URCOLOR Dark yellow 2021    URCLA Cloudy (A) 2021    GLUUR Negative 2023    URINEBILI Small (A) 2021    URINEKETONE Negative 2021    SPECGRAVITY 1.010 2025    PHUR 6.0 2021    PROTURINE 100 (A) 2021    UROBILI <2.0 2021    URINENITRITE Negative 2021    URINELEUK Trace (A) 2021    URINECUL No Growth at 18-24 hrs. 2024       UA  Lab Results   Component Value Date    COLORUR Yellow 2023    CLARITY Clear 2023    SPECGRAVITY 1.010 2025    PROUR Negative 2023    GLUUR Negative 2023    KETUR 40 (A) 2023    BILUR Negative 2023    BLOODURINE Negative 2023    NITRITE Negative 2025    UROBILINOGEN 0.2 2023    LEUUR Negative 2023    UASA  Negative 2022       Vitals:    25 1645   BP: 119/65   BP Location: Left arm   Pulse: 75   Resp: 16   Temp: 98.1 °F (36.7 °C)   TempSrc: Oral       NST  Variability: Moderate           Accelerations: Yes           Decelerations: None            Baseline: 130 BPM           Uterine Irritability: No           Contractions: Regular                    Contraction Frequency: 2-4 (pt denies feeling contractions)                   Acoustic Stimulator: No           Nonstress Test Interpretation: Reactive           Nonstress Test Second Interpretation: Reactive          FHR Category: Category I             Additional Comments Comments: Pt is a  at 37.4 here for rule out SROM. Speculum exam done by RN and Dr. Monique, No ferning noted. ultrasound performed by Dr. Monique GINA-wnl. ok for pt to be discharge home. Pt provided with discharge instructions, kick counts and labor precautions. Pt verbalizes understanding. pt to follow up tomorrow as scheduled.     Chief Complaint   Patient presents with    R/o Rom     Pt states she notice clear fluid leaking at around 1:20pm. Pt states she is feeling baby moving, Denies having regular contraction pain, or vaginal bleeding.         Steff ALARCON RN  2025 6:28 PM    Patient evaluated at bedside by myself after exam findings inconclusive of amniotic rupture. Previous slides collected by RN were evaluated and no ferning detected. Speculum exam performed by myself was negative for pooling and negative nitrazine test. Small amount of blood tinged mucous was noted at the cervix. Slide collected was negative for ferning. Bedside GINA noted to be 12.5 cm. She is stable for discharge and has follow-up OB appointment tomorrow.    Shira Monique MD          [1] No Known Allergies     pt 21 yo m c/o sharp rlq pain that was a 10/10 and now 4/10 associated with nausea that now resolved. feels like it is related to him working outside and not drinking any water  denies fever, chills, dysuria, weakness, diarrhea pt 21 yo m c/o sharp rlq pain that was a 10/10 and now 4/10 associated with nausea that now resolved. feels like it is related to him working outside and not drinking any water  denies fever, chills, dysuria, weakness, diarrhea  appendicitis on ct. bax, admit

## 2025-06-04 NOTE — TELEPHONE ENCOUNTER
37w4d. . Viloa reports she is \"pretty positive\" her water broke this afternoon. Felt gush of fluid around 1:20pm.    +fetal movement today.    Reports 1 contraction this afternoon, along with localized lower back pain.    Denies vaginal bleeding or spotting.    Patient plans [facility] now to r/u rupture.    Informed RODRICK Artis in triage and Dr. Monique.

## 2025-06-04 NOTE — PROGRESS NOTES
Pt is a 35 year old female admitted to TR4/TR4-A.     Chief Complaint   Patient presents with    R/o Rom     Pt states she notice clear fluid leaking at around 1:20pm. Pt states she is feeling baby moving, Denies having regular contraction pain, or vaginal bleeding.      Pt is  37w4d intra-uterine pregnancy.  History obtained, consents signed. Oriented to room, staff, and plan of care.

## 2025-06-07 NOTE — PROGRESS NOTES
Pt is a 35 year old female admitted to TR2/TR2-A.     Chief Complaint   Patient presents with    R/o Rom     Patient felt a gush of fluid at noon today      Pt is  37w6d intra-uterine pregnancy.  History obtained, consents signed. Oriented to room, staff, and plan of care.

## 2025-06-07 NOTE — TRIAGE
[facility]  part of [facility]      Triage Note    Viola Thomas Patient Status:  Outpatient    1990 MRN [id number]   Location [facility] C[facility] Attending Wade Adan, DO   Hosp Day # 0 PCP Samantha Jordan MD      Para:   Estimated Date of Delivery: 25  Gestation: 37w6d    Chief Complaint    R/o Rom         Allergies:  Allergies[1]    Orders Placed This Encounter   Procedures    Rupture of Membrane (ROM),Protein Markers    POCT Ferning       Lab Results   Component Value Date    WBC 7.6 2025    HGB 11.2 (L) 2025    HCT 34.8 (L) 2025    .0 (L) 2025    CREATSERUM 0.52 (L) 2023    BUN 7 2023     2023    K 3.7 2023     2023    CO2 20.0 (L) 2023     (H) 2023    CA 8.7 2023    ALB 2.8 (L) 2023    ALKPHO 108 (H) 2023    BILT 0.3 2023    TP 6.6 2023    AST 7 (L) 2023    ALT 12 (L) 2023    T4F 1.1 2020    TSH 1.940 2020     2021    DDIMER 0.92 (H) 2023    MG 2.5 2020    TROPHS 5 2023    B12 780 2020       Clinitek UA  Lab Results   Component Value Date    URCOLOR Dark yellow 2021    URCLA Cloudy (A) 2021    GLUUR Negative 2023    URINEBILI Small (A) 2021    URINEKETONE Negative 2021    SPECGRAVITY 1.005 2025    PHUR 6.0 2021    PROTURINE 100 (A) 2021    UROBILI <2.0 2021    URINENITRITE Negative 2021    URINELEUK Trace (A) 2021    URINECUL No Growth at 18-24 hrs. 2024       UA  Lab Results   Component Value Date    COLORUR Yellow 2023    CLARITY Clear 2023    SPECGRAVITY 1.005 2025    PROUR Negative 2023    GLUUR Negative 2023    KETUR 40 (A) 2023    BILUR Negative 2023    BLOODURINE Negative 2023    NITRITE Negative 2025    UROBILINOGEN 0.2  01/24/2023    LEUUR Negative 01/24/2023    UASA Negative 06/03/2022       Vitals:    06/06/25 2123   BP: 108/68   Pulse: 80   Resp: 16   Temp: 98 °F (36.7 °C)   TempSrc: Oral   Weight: 73.9 kg (163 lb)       NST  Variability: Moderate           Accelerations: Yes           Decelerations: None            Baseline: 130 BPM           Uterine Irritability: No           Contractions: Irregular                    Contraction Frequency: Occasional mild contractions not felt by patient                   Acoustic Stimulator: No           Nonstress Test Interpretation: Reactive           Nonstress Test Second Interpretation: Reactive          FHR Category: Category I             Additional Comments   Patient came in with complaints of feeling a gush of fluid at noon today. Patient states she has not felt more fluid leaking since then. Positive fetal movement. All rupture test came back negative and  called with the results. Discharge orders received and patient discharged home in stable condition.    Chief Complaint   Patient presents with    R/o Rom     Patient felt a gush of fluid at noon today         Mary MAURICIO RN  6/6/2025 10:55 PM         [1] No Known Allergies

## 2025-06-08 NOTE — PROGRESS NOTES
Jeremias at visit. Seen in [facility] to rule out SROM. Instructions not yet sent. I called to RN pool.

## 2025-06-16 NOTE — PROGRESS NOTES
Patient desires Elective induction. Cervix 3/70/-1, vertex, soft, anterior.  Spoke with [facility] scheduled IOL 6/17 at 8 am  Reviewed with on call, okay to schedule.  Patient aware of time and location    Addendum- patient taking valtrex

## 2025-06-17 NOTE — ANESTHESIA PREPROCEDURE EVALUATION
Anesthesia PreOp Note    HPI:     Viola Thomas is a 35 year old female who presents for preoperative consultation requested by: * No surgeons listed *    Date of Surgery: 2025    * No procedures listed *  Indication: * No pre-op diagnosis entered *    Relevant Problems   No relevant active problems       NPO:                         History Review:  Patient Active Problem List    Diagnosis Date Noted    Pregnancy ([facility]) 2025    No leakage of amniotic fluid into vagina 2025    Thrombocytopenia during pregnancy ([facility]) 2025    Antepartum anemia ([facility]) 2025    Genital herpes 2025    History of prior pregnancy with IUGR  2025    History of cervical LEEP biopsy affecting care of mother, antepartum ([facility]) 2024    History of postpartum depression, currently pregnant ([facility]) 2024    Stress incontinence 2024    IBS (irritable bowel syndrome) 2013    Crohn's disease ([facility]) 2012       Past Medical History[1]    Past Surgical History[2]    Prescriptions Prior to Admission[3]  Current Medications and Prescriptions Ordered in Epic[4]    Allergies[5]    Family History[6]  Social Hx on file[7]    Available pre-op labs reviewed.  Lab Results   Component Value Date    WBC 10.4 2025    RBC 4.12 2025    HGB 11.4 (L) 2025    HCT 33.6 (L) 2025    MCV 81.6 2025    MCH 27.7 2025    MCHC 33.9 2025    RDW 15.6 (H) 2025    .0 2025             Vital Signs:  There is no height or weight on file to calculate BMI.   oral temperature is 98.1 °F (36.7 °C). Her blood pressure is 134/73 and her pulse is 69. Her respiration is 16.   Vitals:    25   BP: 134/73   Pulse: 69   Resp: 16   Temp: 98.1 °F (36.7 °C)   TempSrc: Oral        Anesthesia Evaluation     Patient summary reviewed and Nursing notes reviewed    No history of anesthetic complications   Airway   Mallampati: II  TM distance: >3 FB  Neck ROM:  full  Dental - Dentition appears grossly intact     Pulmonary - negative ROS and normal exam   Cardiovascular - negative ROS and normal exam  Exercise tolerance: good    ECG reviewed    Neuro/Psych - negative ROS     GI/Hepatic/Renal      Comments: Chrons    Endo/Other      Comments: Thrombocytopenia  -173  Abdominal  - normal exam                 Anesthesia Plan:   ASA:  2  Plan:   Epidural  Plan Comments: I have informed patient of the risks of neuraxial anesthesia including, but not limited to: failure, headache, backache, spinal, unilateral/patchy block, difficulty breathing, infection, bleeding, nerve damage, paralysis, death. The patient desires the proposed neuraxial anesthetic as planned. Plan for neuraxial anesthesia with GA backup      Informed Consent Plan and Risks Discussed With:  Patient      I have informed Viola Thomas and/or legal guardian or family member of the nature of the anesthetic plan, benefits, risks including possible dental damage if relevant, major complications, and any alternative forms of anesthetic management.   All of the patient's questions were answered to the best of my ability. The patient desires the anesthetic management as planned.  Rogerio Barbosa MD  6/16/2025 9:06 PM  Present on Admission:  **None**           [1]   Past Medical History:   Abnormal Pap smear of cervix    Anemia    with prev preg.    Chlamydia    Chronic maxillary sinusitis    Decorative tattoo    Deviated nasal septum    Diarrhea    probably IBS, was previously dx'ed with Crohn's    Genital herpes simplex    Herpes    HPV in female    Hypertrophy of nasal turbinates    Nasal septal deviation    Varicella    chicken pox in childhood    Visual impairment    glasses   [2]   Past Surgical History:  Procedure Laterality Date    Colonoscopy  02/2013    Colonoscopy N/A 04/29/2021    Procedure: COLONOSCOPY;  Surgeon: Joey Strong MD;  Location: [facility] ENDOSCOPY    Colposcopy, cervix w/upper adjacent vagina;  w/biopsy(s), cervix      D & c      Excision turbinate      Leep  2014    Leep      Nasal scope,bx/rmv polyp/debrid  11/09/2018    Nasal scope,bx/rmv polyp/debrid  09/18/2020    Nasal scopy,rmv tiss maxill sinus      Nasal scopy,rmv tiss maxill sinus  11/09/2018    Repair of nasal septum      Sinus surgery        septoplasty, TBR    Tonsillectomy     [3]   Medications Prior to Admission   Medication Sig Dispense Refill Last Dose/Taking    valACYclovir (VALTREX) 500 MG Oral Tab Take 1 tablet (500 mg total) by mouth 2 (two) times daily. 60 tablet 0 6/15/2025    prenatal vitamin with DHA 27-0.8-228 MG Oral Cap Take 1 capsule by mouth in the morning.   6/15/2025    hydrocortisone (ANUSOL-HC) 2.5 % External Cream Place 1 Application rectally 3 (three) times daily. 28 g 1    [4]   Current Facility-Administered Medications Ordered in Epic   Medication Dose Route Frequency Provider Last Rate Last Admin    dextrose in lactated ringers 5% infusion   Intravenous Continuous Liana Simon MD        lactated ringers infusion   Intravenous PRN Liana Simon MD        lactated ringers IV bolus 500 mL  500 mL Intravenous PRN Liana Simon MD        acetaminophen (Tylenol Extra Strength) tab 500 mg  500 mg Oral Q6H PRN Liana Simon MD        acetaminophen (Tylenol Extra Strength) tab 1,000 mg  1,000 mg Oral Q6H PRN Liana Simon MD        ibuprofen (Motrin) tab 600 mg  600 mg Oral Once PRN Liana Simon MD        ondansetron (Zofran) 4 MG/2ML injection 4 mg  4 mg Intravenous Q6H PRN Liana Simon MD   4 mg at 06/16/25 2053    oxyTOCIN in sodium chloride 0.9% (Pitocin) 30 Units/500mL infusion premix  62.5-900 marli-units/min Intravenous Continuous Liana Simon MD        terbutaline (Brethine) 1 MG/ML injection 0.25 mg  0.25 mg Subcutaneous PRN Liana Simon MD        sodium citrate-citric acid (Bicitra) 500-334 MG/5ML oral solution 30 mL  30 mL Oral PRN Liana Simon MD        lidocaine PF (Xylocaine-MPF) 1%  injection  30 mL Intradermal Once Liana Simon MD        fentaNYL (Sublimaze) 50 mcg/mL injection 50 mcg  50 mcg Intravenous Q30 Min PRN Liana Simon MD        oxyTOCIN in sodium chloride 0.9% (Pitocin) 30 Units/500mL infusion premix  0.5-20 marli-units/min Intravenous Continuous Liana Simon MD        fentaNYL (Sublimaze) 50 mcg/mL injection             lidocaine PF (Xylocaine-MPF) 1 % injection             oxyTOCIN in sodium chloride 0.9% (Pitocin) 30 Units/500mL infusion premix             lactated ringers IV bolus 1,000 mL  1,000 mL Intravenous Once Rogerio Barbosa MD        fentaNYL-bupivacaine 2 mcg/mL-0.125% in sodium chloride 0.9% 100 mL EPIDURAL infusion premix   Epidural Continuous Rogerio Barbosa MD        fentaNYL (Sublimaze) 50 mcg/mL injection 100 mcg  100 mcg Epidural Once Rogerio Barbosa MD        bupivacaine PF (Marcaine) 0.25% injection  20 mL Epidural Once Rogerio Barbosa MD        ePHEDrine (PF) 25 MG/5 ML injection 5 mg  5 mg Intravenous PRN Rogerio Barbosa MD        nalbuphine (Nubain) 10 mg/mL injection 2.5 mg  2.5 mg Intravenous Q15 Min PRN Rogerio Barbosa MD        lidocaine PF (Xylocaine-MPF) 1 % injection             bupivacaine PF (Marcaine) 0.25 % injection             bupivacaine PF (Marcaine) 0.25 % injection             sodium chloride 0.9% PF 0.9% injection             ePHEDrine (PF) 25 MG/5 ML injection             fentaNYL-bupivacaine in sodium chloride 0.9% 2 mcg/mL-0.125% EPIDURAL infusion premix              No current [facility]-ordered outpatient medications on file.   [5] No Known Allergies  [6]   Family History  Problem Relation Age of Onset    No Known Problems Father     No Known Problems Mother     No Known Problems Sister     No Known Problems Brother    [7]   Social History  Socioeconomic History    Marital status: Single   Tobacco Use    Smoking status: Former     Current packs/day: 0.00     Average packs/day: 0.5 packs/day for 10.0 years (5.0 ttl pk-yrs)     Types: Cigarettes      Start date: 2012     Quit date: 2022     Years since quittin.9    Smokeless tobacco: Former     Quit date: 2024   Vaping Use    Vaping status: Former   Substance and Sexual Activity    Alcohol use: Not Currently     Alcohol/week: 1.0 - 2.0 standard drink of alcohol     Types: 1 - 2 Standard drinks or equivalent per week     Comment: 2-3 drinks every weekend before preg    Drug use: No    Sexual activity: Yes     Partners: Male     Birth control/protection: Condom, OCP     Comment: same partner x 2 years   Other Topics Concern    Blood Transfusions No    Caffeine Concern No    Sleep Concern No    Stress Concern Yes    Weight Concern No    Special Diet No    Back Care No    Exercise Yes     Comment: ocassional    Seat Belt Yes

## 2025-06-17 NOTE — ANESTHESIA PROCEDURE NOTES
Labor Analgesia    Date/Time: 6/16/2025 8:55 PM    Performed by: Rogerio Barbosa MD  Authorized by: Rogerio Barbosa MD      General Information and Staff    Start Time:  6/16/2025 8:55 PM  End Time:  6/16/2025 9:00 PM  Anesthesiologist:  Rogerio Barbosa MD  Performed by:  Anesthesiologist  Patient Location:  OB  Site Identification: surface landmarks    Reason for Block: labor epidural    Preanesthetic Checklist: patient identified, IV checked, site marked, risks and benefits discussed, monitors and equipment checked, pre-op evaluation, timeout performed, anesthesia consent and sterile technique used      Procedure Details    Patient Position:  Sitting  Prep: ChloraPrep    Monitoring:  Heart rate  Approach:  Midline    Epidural Needle    Injection Technique:  ESTRELLA air  Needle Type:  Tuohy  Needle Gauge:  18 G  Needle Length:  3.5 in  Needle Insertion Depth:  5  Location:  L2-3    Spinal Needle    Needle Type:  Pencil-tip  Needle Gauge:  27 G    Catheter    Catheter Type:  Multi-orifice  Catheter Size:  20 G  Catheter at Skin Depth:  10  Test Dose:  Negative    Assessment      Additional Comments     Patient tolerated epidural placement well with no complications noted. Dural Puncture epidural done with 27g spinal needle.

## 2025-06-17 NOTE — ANESTHESIA POSTPROCEDURE EVALUATION
Patient: Viola Thomas    Procedure Summary       Date: 06/16/25 Room / Location:     Anesthesia Start: 2055 Anesthesia Stop: 2150    Procedure: LABOR ANALGESIA Diagnosis:     Scheduled Providers:  Anesthesiologist: Rogerio Barbosa MD    Anesthesia Type: epidural ASA Status: 2            Anesthesia Type: epidural    Vitals Value Taken Time   /75 06/16/25 22:45   Temp 37.7 06/17/25 02:23   Pulse 65 06/16/25 22:45   Resp 16 06/17/25 02:23   SpO2 99 06/17/25 02:23       [facility] AN Post Evaluation:   Patient Evaluated in PACU  Patient Participation: complete - patient participated  Level of Consciousness: awake  Pain Management: adequate  Airway Patency:patent  Dental exam unchanged from preop  Yes    Nausea/Vomiting: none  Cardiovascular Status: acceptable  Respiratory Status: acceptable  Postoperative Hydration acceptable      Rogerio Barbosa MD  6/17/2025 2:23 AM

## 2025-06-17 NOTE — L&D DELIVERY NOTE
[facility]  part of [facility]    Vaginal Delivery Note    Viola Thomas Patient Status:  Inpatient    1990 MRN [id number]   Location [facility] Attending Jazmyne Chu MD   Hosp Day # 0 PCP Samantha Jordan MD     Delivery     Infant Info:  Date of Delivery: 2025   Time of Delivery: 9:45 PM  Delivery Type: Normal spontaneous vaginal delivery    Live   Information for the patient's :  William Girl [[id number]]   female infant   Information for the patient's :  William Girl [[id number]]   7 lb 6.5 oz (3.36 kg)   Apgars:  1 minute: 8               5 minutes: 9                        10 minutes:      Presentation Vertex [1]    Position Right [3] Occiput [1] Anterior [1]    Delivery Comment: Baby CRISELDA.  Shoulder atraumatically delivered followed by the trunk and LE. Nasopharyngeally bulb suctioned at the perineum. Cord clamped and cut. Baby handed to the awaiting nursing personal. Placenta delivered complete and intact with a 3 VC. Sponge count correct. Drasco and patient stable in the delivery room with nursing present.    Maternal Anesthesia: epidural     Delivery Complications:  none    Neonatologist Present: no    Placenta info:  Date/Time of Delivery: 2025  9:50 PM   Delivery: spontaneous  Placenta to Pathology: no    Cord info:  Cord Gases Submitted: no  Cord Blood Collection: no  Cord Tissue Collection: no  Cord Complications: none    Sponge and Needle Counts:  Verified    Quantitative Blood Loss (mL) 50     Jazmyne Chu MD   2025  10:21 PM

## 2025-06-17 NOTE — PLAN OF CARE
Problem: Patient Centered Care  Goal: Patient preferences are identified and integrated in the patient's plan of care  Description: Interventions:  - What would you like us to know as we care for you?   - Provide timely, complete, and accurate information to patient/family  - Incorporate patient and family knowledge, values, beliefs, and cultural backgrounds into the planning and delivery of care  - Encourage patient/family to participate in care and decision-making at the level they choose  - Honor patient and family perspectives and choices  6/17/2025 0951 by Alondra Capps RN  Outcome: Progressing     Problem: Patient/Family Goals  Goal: Patient/Family Long Term Goal  Description: Patient's Long Term Goal:     Interventions:  -   - See additional Care Plan goals for specific interventions  6/17/2025 0951 by Alondra Capps RN  Outcome: Progressing     Problem: Patient/Family Goals  Goal: Patient/Family Short Term Goal  Description: Patient's Short Term Goal:     Interventions:   -   - See additional Care Plan goals for specific interventions  6/17/2025 0951 by Alondra Capps RN  Outcome: Progressing     Problem: POSTPARTUM  Goal: Long Term Goal:Experiences normal postpartum course  Description: INTERVENTIONS:  - Assess and monitor vital signs and lab values.  - Assess fundus and lochia.  - Provide ice/sitz baths for perineum discomfort.  - Monitor healing of incision/episiotomy/laceration, and assess for signs and symptoms of infection and hematoma.  - Assess bladder function and monitor for bladder distention.  - Provide/instruct/assist with pericare as needed.  - Provide VTE prophylaxis as needed.  - Monitor bowel function.  - Encourage ambulation and provide assistance as needed.  - Assess and monitor emotional status and provide social service/psych resources as needed.  - Utilize standard precautions and use personal protective equipment as indicated. Ensure aseptic care of all intravenous lines and  invasive tubes/drains.  - Obtain immunization and exposure to communicable diseases history.  Outcome: Progressing     Problem: POSTPARTUM  Goal: Optimize infant feeding at the breast  Description: INTERVENTIONS:  - Initiate breast feeding within first hour after birth.   - Monitor effectiveness of current breast feeding efforts.  - Assess support systems available to mother/family.  - Identify cultural beliefs/practices regarding lactation, letdown techniques, maternal food preferences.  - Assess mother's knowledge and previous experience with breast feeding.  - Provide information as needed about early infant feeding cues (e.g., rooting, lip smacking, sucking fingers/hand) versus late cue of crying.  - Discuss/demonstrate breast feeding aids (e.g., infant sling, nursing footstool/pillows, and breast pumps).  - Encourage mother/other family members to express feelings/concerns, and actively listen.  - Educate father/SO about benefits of breast feeding and how to manage common lactation challenges.  - Recommend avoidance of specific medications or substances incompatible with breast feeding.  - Assess and monitor for signs of nipple pain/trauma.  - Instruct and provide assistance with proper latch.  - Review techniques for milk expression (breast pumping) and storage of breast milk. Provide pumping equipment/supplies, instructions and assistance, as needed.  - Encourage rooming-in and breast feeding on demand.  - Encourage skin-to-skin contact.  - Provide LC support as needed.  - Assess for and manage engorgement.  - Provide breast feeding education handouts and information on community breast feeding support.   Outcome: Progressing     Problem: POSTPARTUM  Goal: Establishment of adequate milk supply with medication/procedure interruptions  Description: INTERVENTIONS:  - Review techniques for milk expression (breast pumping).   - Provide pumping equipment/supplies, instructions, and assistance until it is safe to  breastfeed infant.  Outcome: Progressing     Problem: POSTPARTUM  Goal: Experiences normal breast weaning course  Description: INTERVENTIONS:  - Assess for and manage engorgement.  - Instruct on breast care.  - Provide comfort measures.  Outcome: Progressing     Problem: POSTPARTUM  Goal: Appropriate maternal -  bonding  Description: INTERVENTIONS:  - Assess caregiver- interactions.  - Assess caregiver's emotional status and coping mechanisms.  - Encourage caregiver to participate in  daily care.  - Assess support systems available to mother/family.  - Provide /case management support as needed.  Outcome: Progressing

## 2025-06-17 NOTE — DISCHARGE SUMMARY
[facility]  part of [facility]    Discharge Summary    Viola Thomas Patient Status:  Inpatient    1990 MRN [id number]   Location [facility] Attending Jazmyne Chu MD   Hosp Day # 2       Admission date:  2025    Delivering OB Clinician: Kimberley    EDC: Estimated Date of Delivery: 25    Gestational Age: 39w2d    Antepartum complications:   Patient Active Problem List   Diagnosis    IBS (irritable bowel syndrome)    Crohn's disease ([facility])    Stress incontinence    History of cervical LEEP biopsy affecting care of mother, antepartum ([facility])    History of postpartum depression, currently pregnant ([facility])    Genital herpes    History of prior pregnancy with IUGR     Antepartum anemia ([facility])    Thrombocytopenia during pregnancy ([facility])    No leakage of amniotic fluid into vagina    Pregnancy ([facility])       Date of Delivery: 2025 Time of Delivery: 9:45 PM    Delivery Type: spontaneous vaginal delivery    Baby: Liveborn female   Information for the patient's :  William Girl [[id number]]   7 lb 6.5 oz (3.36 kg)  Apgars:  1 minute: 8  5 minutes: 910 minutes:       Intrapartum Complications: None     Discharge Date: 25    Hospital Course: patient admitted in active labor at 6cm.  She rapidly progressed to an  of a viable female infant over intact perineum.  No lacerations.  No complications. Routine delivery and postpartum care.    Discharge Physical Exam:   /68 (BP Location: Right arm)   Pulse 80   Temp 98.3 °F (36.8 °C) (Oral)   Resp 16   Ht 5' 3\" (1.6 m)   LMP 2024 (Approximate)   SpO2 100%   Breastfeeding Yes   BMI 28.70 kg/m²   General appearance:  alert, appears stated age, cooperative and no distress  Abdominal: soft, non-tender, no rebound  Uterus: firm, nontender, below umbilicus  Pelvic: deferred  Extremities: Homans sign is negative, no sign of DVT    Discharged Condition: stable    Disposition: home    Plan:      Follow-up appointment in 6 weeks with Dr. Chu        6/16/2025  10:19 PM

## 2025-06-17 NOTE — PROGRESS NOTES
Patient up to bathroom with assist x 2.  Voided 150 before transfer. Patient transferred to mother/baby room 358 per wheelchair in stable condition with baby and personal belongings.  Accompanied by significant other and staff.  Report given to mother/baby RN Carmina.

## 2025-06-18 NOTE — PLAN OF CARE
Pt discharged in wheelchair with baby and father of baby. AVS provided with discharge instructions. Pt verbalizes understanding. Follow up reviewed and planned.

## 2025-06-18 NOTE — PLAN OF CARE
Problem: Patient Centered Care  Goal: Patient preferences are identified and integrated in the patient's plan of care  Description: Interventions:  - What would you like us to know as we care for you?   - Provide timely, complete, and accurate information to patient/family  - Incorporate patient and family knowledge, values, beliefs, and cultural backgrounds into the planning and delivery of care  - Encourage patient/family to participate in care and decision-making at the level they choose  - Honor patient and family perspectives and choices  6/18/2025 0021 by Tonie Enriquez RN  Outcome: Progressing  6/17/2025 2026 by Tonie Enriquez RN  Outcome: Progressing     Problem: Patient/Family Goals  Goal: Patient/Family Long Term Goal  Description: Patient's Long Term Goal:     Interventions:  -   - See additional Care Plan goals for specific interventions  6/18/2025 0021 by Tonie Enriquez RN  Outcome: Progressing  6/17/2025 2026 by Tonie Enriquez RN  Outcome: Progressing  Goal: Patient/Family Short Term Goal  Description: Patient's Short Term Goal:     Interventions:   -   - See additional Care Plan goals for specific interventions  6/18/2025 0021 by Tonie Enriquez RN  Outcome: Progressing  6/17/2025 2026 by Tonie Enriquez RN  Outcome: Progressing     Problem: POSTPARTUM  Goal: Long Term Goal:Experiences normal postpartum course  Description: INTERVENTIONS:  - Assess and monitor vital signs and lab values.  - Assess fundus and lochia.  - Provide ice/sitz baths for perineum discomfort.  - Monitor healing of incision/episiotomy/laceration, and assess for signs and symptoms of infection and hematoma.  - Assess bladder function and monitor for bladder distention.  - Provide/instruct/assist with pericare as needed.  - Provide VTE prophylaxis as needed.  - Monitor bowel function.  - Encourage ambulation and provide assistance as needed.  - Assess and monitor emotional status and provide social service/psych resources as  needed.  - Utilize standard precautions and use personal protective equipment as indicated. Ensure aseptic care of all intravenous lines and invasive tubes/drains.  - Obtain immunization and exposure to communicable diseases history.  6/18/2025 0021 by Tonie Enriquez RN  Outcome: Progressing  6/17/2025 2026 by Tonie Enriquez RN  Outcome: Progressing  Goal: Optimize infant feeding at the breast  Description: INTERVENTIONS:  - Initiate breast feeding within first hour after birth.   - Monitor effectiveness of current breast feeding efforts.  - Assess support systems available to mother/family.  - Identify cultural beliefs/practices regarding lactation, letdown techniques, maternal food preferences.  - Assess mother's knowledge and previous experience with breast feeding.  - Provide information as needed about early infant feeding cues (e.g., rooting, lip smacking, sucking fingers/hand) versus late cue of crying.  - Discuss/demonstrate breast feeding aids (e.g., infant sling, nursing footstool/pillows, and breast pumps).  - Encourage mother/other family members to express feelings/concerns, and actively listen.  - Educate father/SO about benefits of breast feeding and how to manage common lactation challenges.  - Recommend avoidance of specific medications or substances incompatible with breast feeding.  - Assess and monitor for signs of nipple pain/trauma.  - Instruct and provide assistance with proper latch.  - Review techniques for milk expression (breast pumping) and storage of breast milk. Provide pumping equipment/supplies, instructions and assistance, as needed.  - Encourage rooming-in and breast feeding on demand.  - Encourage skin-to-skin contact.  - Provide LC support as needed.  - Assess for and manage engorgement.  - Provide breast feeding education handouts and information on community breast feeding support.   6/18/2025 0021 by Tonie Enriquez RN  Outcome: Progressing  6/17/2025 2026 by Tonie Enriquez  RN  Outcome: Progressing  Goal: Establishment of adequate milk supply with medication/procedure interruptions  Description: INTERVENTIONS:  - Review techniques for milk expression (breast pumping).   - Provide pumping equipment/supplies, instructions, and assistance until it is safe to breastfeed infant.  2025 002 by Tonie Enriquez RN  Outcome: Progressing  2025 by Tonie Enriquez RN  Outcome: Progressing  Goal: Appropriate maternal -  bonding  Description: INTERVENTIONS:  - Assess caregiver- interactions.  - Assess caregiver's emotional status and coping mechanisms.  - Encourage caregiver to participate in  daily care.  - Assess support systems available to mother/family.  - Provide /case management support as needed.  2025 002 by Tonie Enriquez RN  Outcome: Progressing  2025 by Tonie Enriquez RN  Outcome: Progressing

## 2025-06-18 NOTE — DISCHARGE INSTRUCTIONS
May alternate ibuprofen and acetaminophen for pain  Last acetaminophen 1145   Last ibuprofen at 0845

## 2025-06-18 NOTE — PLAN OF CARE
Problem: Patient Centered Care  Goal: Patient preferences are identified and integrated in the patient's plan of care  Description: Interventions:  - What would you like us to know as we care for you? 3rd baby  - Provide timely, complete, and accurate information to patient/family  - Incorporate patient and family knowledge, values, beliefs, and cultural backgrounds into the planning and delivery of care  - Encourage patient/family to participate in care and decision-making at the level they choose  - Honor patient and family perspectives and choices  Outcome: Adequate for Discharge     Problem: Patient/Family Goals  Goal: Patient/Family Long Term Goal  Description: Patient's Long Term Goal: to go kvng e    Interventions:  - See additional Care Plan goals for specific interventions  Outcome: Adequate for Discharge  Goal: Patient/Family Short Term Goal  Description: Patient's Short Term Goal: discharge    Interventions:   - See additional Care Plan goals for specific interventions  Outcome: Adequate for Discharge     Problem: POSTPARTUM  Goal: Long Term Goal:Experiences normal postpartum course  Description: INTERVENTIONS:  - Assess and monitor vital signs and lab values.  - Assess fundus and lochia.  - Provide ice/sitz baths for perineum discomfort.  - Monitor healing of incision/episiotomy/laceration, and assess for signs and symptoms of infection and hematoma.  - Assess bladder function and monitor for bladder distention.  - Provide/instruct/assist with pericare as needed.  - Provide VTE prophylaxis as needed.  - Monitor bowel function.  - Encourage ambulation and provide assistance as needed.  - Assess and monitor emotional status and provide social service/psych resources as needed.  - Utilize standard precautions and use personal protective equipment as indicated. Ensure aseptic care of all intravenous lines and invasive tubes/drains.  - Obtain immunization and exposure to communicable diseases  history.  Outcome: Adequate for Discharge  Goal: Optimize infant feeding at the breast  Description: INTERVENTIONS:  - Initiate breast feeding within first hour after birth.   - Monitor effectiveness of current breast feeding efforts.  - Assess support systems available to mother/family.  - Identify cultural beliefs/practices regarding lactation, letdown techniques, maternal food preferences.  - Assess mother's knowledge and previous experience with breast feeding.  - Provide information as needed about early infant feeding cues (e.g., rooting, lip smacking, sucking fingers/hand) versus late cue of crying.  - Discuss/demonstrate breast feeding aids (e.g., infant sling, nursing footstool/pillows, and breast pumps).  - Encourage mother/other family members to express feelings/concerns, and actively listen.  - Educate father/SO about benefits of breast feeding and how to manage common lactation challenges.  - Recommend avoidance of specific medications or substances incompatible with breast feeding.  - Assess and monitor for signs of nipple pain/trauma.  - Instruct and provide assistance with proper latch.  - Review techniques for milk expression (breast pumping) and storage of breast milk. Provide pumping equipment/supplies, instructions and assistance, as needed.  - Encourage rooming-in and breast feeding on demand.  - Encourage skin-to-skin contact.  - Provide LC support as needed.  - Assess for and manage engorgement.  - Provide breast feeding education handouts and information on community breast feeding support.   Outcome: Adequate for Discharge  Goal: Establishment of adequate milk supply with medication/procedure interruptions  Description: INTERVENTIONS:  - Review techniques for milk expression (breast pumping).   - Provide pumping equipment/supplies, instructions, and assistance until it is safe to breastfeed infant.  Outcome: Adequate for Discharge  Goal: Experiences normal breast weaning  course  Description: INTERVENTIONS:  - Assess for and manage engorgement.  - Instruct on breast care.  - Provide comfort measures.  Outcome: Adequate for Discharge  Goal: Appropriate maternal -  bonding  Description: INTERVENTIONS:  - Assess caregiver- interactions.  - Assess caregiver's emotional status and coping mechanisms.  - Encourage caregiver to participate in  daily care.  - Assess support systems available to mother/family.  - Provide /case management support as needed.  Outcome: Adequate for Discharge   Up ad nya. Refusing pain medicine. Requesting discharge home today.

## 2025-06-18 NOTE — PROGRESS NOTES
[facility]  part of [facility]    OB/Gyne Post  Progress Note      Viola Thomas Patient Status:  Inpatient    1990 MRN [id number]   Location [facility] 3SE Attending Jazmyne Chu MD   Hosp Day # 1 PCP Samantha Jordan MD       Subjective     Good pain control. Tolerating present diet. Ambulating well. Voiding freely.  She denies headache, fever, chest pain, shortness of breath, dizziness upon ambulation nor leg pain.     Objective   Vital signs in last 24 hours:  Vitals:    25 0630 25 0800 25 0848 25   BP: 95/56  95/51 101/67   Pulse: 61  62 79   Resp: 18  15 16   Temp: 98.2 °F (36.8 °C)  98.6 °F (37 °C) 98.3 °F (36.8 °C)   TempSrc: Oral  Oral Oral   SpO2:       Height:  5' 3\" (1.6 m)          Input/Output:    Intake/Output Summary (Last 24 hours) at 2025 2148  Last data filed at 2025 0549  Gross per 24 hour   Intake --   Output 1650 ml   Net -1650 ml       AVSS  Constitutional: comfortable  Abdomen: soft nontender  Uterus: fundus below umbilicus, appropriately tender  Extremities: No calf tenderness, SCDs on while in bed, 1+ pitting edema.      Results:   Labs / Path / Radiology:    Recent Results (from the past 24 hours)   CBC With Differential With Platelet    Collection Time: 25  5:02 AM   Result Value Ref Range    WBC 12.9 (H) 4.0 - 11.0 x10(3) uL    RBC 3.61 (L) 3.80 - 5.30 x10(6)uL    HGB 10.2 (L) 12.0 - 16.0 g/dL    HCT 31.3 (L) 35.0 - 48.0 %    MCV 86.7 80.0 - 100.0 fL    MCH 28.3 26.0 - 34.0 pg    MCHC 32.6 31.0 - 37.0 g/dL    RDW-SD 48.4 (H) 35.1 - 46.3 fL    RDW 15.2 (H) 11.0 - 15.0 %    .0 (L) 150.0 - 450.0 10(3)uL    Neutrophil Absolute Prelim 10.02 (H) 1.50 - 7.70 x10 (3) uL    Neutrophil Absolute 10.02 (H) 1.50 - 7.70 x10(3) uL    Lymphocyte Absolute 2.03 1.00 - 4.00 x10(3) uL    Monocyte Absolute 0.74 0.10 - 1.00 x10(3) uL    Eosinophil Absolute 0.09 0.00 - 0.70 x10(3) uL    Basophil Absolute 0.02 0.00 -  0.20 x10(3) uL    Immature Granulocyte Absolute 0.04 0.00 - 1.00 x10(3) uL    Neutrophil % 77.4 %    Lymphocyte % 15.7 %    Monocyte % 5.7 %    Eosinophil % 0.7 %    Basophil % 0.2 %    Immature Granulocyte % 0.3 %   CBC w/ Diff, Reflex to Ferritin    Collection Time: 25  5:02 AM   Result Value Ref Range    WBC 12.9 (H) 4.0 - 11.0 x10(3) uL    RBC 3.61 (L) 3.80 - 5.30 x10(6)uL    HGB 10.2 (L) 12.0 - 16.0 g/dL    HCT 31.3 (L) 35.0 - 48.0 %    MCV 86.7 80.0 - 100.0 fL    MCH 28.3 26.0 - 34.0 pg    MCHC 32.6 31.0 - 37.0 g/dL    RDW-SD 48.4 (H) 35.1 - 46.3 fL    RDW 15.2 (H) 11.0 - 15.0 %    .0 (L) 150.0 - 450.0 10(3)uL    Neutrophil Absolute Prelim 10.02 (H) 1.50 - 7.70 x10 (3) uL    Neutrophil Absolute 10.02 (H) 1.50 - 7.70 x10(3) uL    Lymphocyte Absolute 2.03 1.00 - 4.00 x10(3) uL    Monocyte Absolute 0.74 0.10 - 1.00 x10(3) uL    Eosinophil Absolute 0.09 0.00 - 0.70 x10(3) uL    Basophil Absolute 0.02 0.00 - 0.20 x10(3) uL    Immature Granulocyte Absolute 0.04 0.00 - 1.00 x10(3) uL    Neutrophil % 77.4 %    Lymphocyte % 15.7 %    Monocyte % 5.7 %    Eosinophil % 0.7 %    Basophil % 0.2 %    Immature Granulocyte % 0.3 %   Hold Ferritin    Collection Time: 25  5:02 AM   Result Value Ref Range    Hold Lt Green Auto Resulted     HOLDGRN      Hold Gold Auto Resulted    Ferritin    Collection Time: 25  5:02 AM   Result Value Ref Range    Ferritin 4 (L) 50 - 306 ng/mL   RAINBOW DRAW GOLD    Collection Time: 25  5:08 AM   Result Value Ref Range    Hold Gold Auto Resulted        Specimens (From admission, onward)      None            No results found.      Assessment/Plan              35 year oldyo  , s/p spontaneous vaginal, PPD# 1     Patient Active Problem List   Diagnosis    IBS (irritable bowel syndrome)    Crohn's disease ([facility])    Stress incontinence    History of cervical LEEP biopsy affecting care of mother, antepartum ([facility])    History of postpartum depression, currently  pregnant (HCC)    Genital herpes    History of prior pregnancy with IUGR     Antepartum anemia (HCC)    Thrombocytopenia during pregnancy (HCC)    No leakage of amniotic fluid into vagina    Pregnancy (HCC)   .    ambulate, continue routine postpartum care        Liana Simon MD  2025  9:48 PM

## 2025-07-03 NOTE — H&P
[facility]  part of [facility]    History & Physical    Viola Thomas Patient Status:  Inpatient    1990 MRN [id number]   Location [facility] 3SE Attending No att. providers found   Hosp Day # 2 PCP Samantha Jordan MD     Date of Admission:  2025      HPI:   Viola Thomas is a 35 year old  female, current EGA of 39w2d with an estimated date of delivery of: 2025, by Last Menstrual Period    Review of Systems:   10 point ROS completed and was negative, except for pertinent positive and negatives stated in subjective.    Viola Thomas is being admitted for labor management.    Her current obstetrical history is significant for see problem list.    Patient Active Problem List    Diagnosis Date Noted    Pregnancy ([facility]) 2025    No leakage of amniotic fluid into vagina 2025    Thrombocytopenia during pregnancy ([facility]) 2025    Antepartum anemia ([facility]) 2025    Genital herpes 2025    History of prior pregnancy with IUGR  2025    History of cervical LEEP biopsy affecting care of mother, antepartum ([facility]) 2024    History of postpartum depression, currently pregnant ([facility]) 2024    Stress incontinence 2024    IBS (irritable bowel syndrome) 2013    Crohn's disease ([facility]) 2012      Patient reports painful contractions .     Fetal Movement: Yes    History   Obstetric History:   OB History    Para Term  AB Living   5 3 2 1 2 3   SAB IAB Ectopic Multiple Live Births   2   0 3      # Outcome Date GA Lbr Thaddeus/2nd Weight Sex Type Anes PTL Lv   5 Term 25 39w2d 03:57 / 00:18 7 lb 6.5 oz (3.36 kg) F NORMAL SPONT None, EPI N FLORENTINO   4 Term 23 38w6d 08:05 / 02:24 8 lb 2.5 oz (3.7 kg) F Vag-Vacuum EPI N FLORENTINO      Complications: Late decelerations, Meconium, Fetal intolerance to labor   3 SAB 22 6w0d   U          Birth Comments: Hcg levels were watched to come down.   Done with CDH    2   02/20/09 36w0d  6 lb 2 oz (2.778 kg) M NORMAL SPONT  N FLORENTINO      Birth Comments: Pt was induced d/t placenta growing, baby stopped growing.    1 SAB 2008 8w0d   U          Birth Comments: D&C d/t no heart tones  Pt states it was in the first trimester early, around 8 weeks.      Past Medical History:   Past Medical History:    Abnormal Pap smear of cervix    Anemia    with prev preg.    Chlamydia    Chronic maxillary sinusitis    Decorative tattoo    Deviated nasal septum    Diarrhea    probably IBS, was previously dx'ed with Crohn's    Genital herpes simplex    Herpes    HPV in female    Hypertrophy of nasal turbinates    Nasal septal deviation    Varicella    chicken pox in childhood    Visual impairment    glasses     Past Surgerical History:   Past Surgical History:   Procedure Laterality Date    Colonoscopy  02/2013    Colonoscopy N/A 04/29/2021    Procedure: COLONOSCOPY;  Surgeon: Joey Strong MD;  Location: [facility] ENDOSCOPY    Colposcopy, cervix w/upper adjacent vagina; w/biopsy(s), cervix      D & c      Excision turbinate      Leep  2014    Leep      Nasal scope,bx/rmv polyp/debrid  11/09/2018    Nasal scope,bx/rmv polyp/debrid  09/18/2020    Nasal scopy,rmv tiss maxill sinus      Nasal scopy,rmv tiss maxill sinus  11/09/2018    Repair of nasal septum      Sinus surgery        septoplasty, TBR    Tonsillectomy       Social History:   Social History     Tobacco Use    Smoking status: Former     Current packs/day: 0.00     Average packs/day: 0.5 packs/day for 10.0 years (5.0 ttl pk-yrs)     Types: Cigarettes     Start date: 7/4/2012     Quit date: 7/4/2022     Years since quitting: 3.0    Smokeless tobacco: Former     Quit date: 09/2024   Substance Use Topics    Alcohol use: Not Currently     Alcohol/week: 1.0 - 2.0 standard drink of alcohol     Types: 1 - 2 Standard drinks or equivalent per week     Comment: 2-3 drinks every weekend before preg      Family History:  No pertinent family  history  Allergies/Medications:   Allergies:   No Known Allergies  Medications:  No medications prior to admission.       Review of Systems:   As documented in HPI      Physical Exam:        Constitutional: alert and cooperative in mild distress  Psych:  Alert and oriented  Skin:  Warm and hydrated, No rashes  CV:  No edema  Respiratory:  No labored breathing  Abdomen: gravid nontender    Musculoskeletal:  No calf tenderness    FHT assessment: normal baseline    Results:   No results for input(s): \"RBC\", \"HGB\", \"HCT\", \"MCV\", \"MCH\", \"MCHC\", \"RDW\", \"NEPRELIM\", \"WBC\", \"PLT\" in the last 168 hours.       No results found.         Assessment/Plan:     Pregnancy (HCC)  IUP@ 39 weeks      Treatment Plan:  Intervention: anticipate vaginal delivery.    Jazmyne Chu MD  7/3/2025  2:26 PM

## 2025-07-06 NOTE — DISCHARGE INSTRUCTIONS
Heat warm compresses  Pumping or breast-feeding frequently  Ibuprofen is safe for pain and breast-feeding  For any new or worsening symptoms advise follow-up with your OB/GYN or ER

## 2025-07-06 NOTE — ED PROVIDER NOTES
Patient Seen in: [facility] [place]        History  Chief Complaint   Patient presents with    Mastitis     Stated Complaint: breast issue    Subjective:   HPI     Viola Thomas is a 35 year old female who presents with breast tenderness and symptoms suggestive of mastitis.    She has been experiencing breast tenderness that began after a day of not being able to feed and pump as usual. Initially, the pain was localized at the bottom of the breast but later spread to the entire breast by the evening.    She is currently breastfeeding her three-week-old baby and mentions that breastfeeding is generally going well.    She woke up with chills and body aches, symptoms she has not experienced before, describing feeling 'miserable' due to the discomfort.            Objective:     Past Medical History:    Abnormal Pap smear of cervix    Anemia    with prev preg.    Chlamydia    Chronic maxillary sinusitis    Decorative tattoo    Deviated nasal septum    Diarrhea    probably IBS, was previously dx'ed with Crohn's    Genital herpes simplex    Herpes    HPV in female    Hypertrophy of nasal turbinates    Nasal septal deviation    Varicella    chicken pox in childhood    Visual impairment    glasses              Past Surgical History:   Procedure Laterality Date    Colonoscopy  02/2013    Colonoscopy N/A 04/29/2021    Procedure: COLONOSCOPY;  Surgeon: Joey Strong MD;  Location: [facility] ENDOSCOPY    Colposcopy, cervix w/upper adjacent vagina; w/biopsy(s), cervix      D & c      Excision turbinate      Leep  2014    Leep      Nasal scope,bx/rmv polyp/debrid  11/09/2018    Nasal scope,bx/rmv polyp/debrid  09/18/2020    Nasal scopy,rmv tiss maxill sinus      Nasal scopy,rmv tiss maxill sinus  11/09/2018    Repair of nasal septum      Sinus surgery        septoplasty, TBR    Tonsillectomy                  Social History     Socioeconomic History    Marital status: Single   Tobacco Use    Smoking status: Former      Current packs/day: 0.00     Average packs/day: 0.5 packs/day for 10.0 years (5.0 ttl pk-yrs)     Types: Cigarettes     Start date: 7/4/2012     Quit date: 7/4/2022     Years since quitting: 3.0    Smokeless tobacco: Former     Quit date: 09/2024   Vaping Use    Vaping status: Former   Substance and Sexual Activity    Alcohol use: Not Currently     Alcohol/week: 1.0 - 2.0 standard drink of alcohol     Types: 1 - 2 Standard drinks or equivalent per week     Comment: 2-3 drinks every weekend before preg    Drug use: No    Sexual activity: Yes     Partners: Male     Birth control/protection: Condom, OCP     Comment: same partner x 2 years   Other Topics Concern    Blood Transfusions No    Caffeine Concern No    Sleep Concern No    Stress Concern Yes    Weight Concern No    Special Diet No    Back Care No    Exercise Yes     Comment: ocassional    Seat Belt Yes   Social History Narrative    Work - bartending, 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     Social Drivers of Health     Food Insecurity: No Food Insecurity (6/6/2025)    NCSS - Food Insecurity     Worried About Running Out of Food in the Last Year: No     Ran Out of Food in the Last Year: No   Transportation Needs: No Transportation Needs (6/6/2025)    NCSS - Transportation     Lack of Transportation: No   Stress: No Stress Concern Present (6/6/2025)    Stress     Feeling of Stress : No   Housing Stability: Unknown (6/6/2025)    NCSS - Housing/Utilities     Has Housing: Yes     Worried About Losing Housing: No              Review of Systems    Positive for stated complaint: breast issue  Other systems are as noted in HPI.  Constitutional and vital signs reviewed.      All other systems reviewed and negative except as noted above.                  Physical Exam    ED Triage Vitals [07/06/25 1116]   /71   Pulse 88   Resp 18   Temp 98.1 °F (36.7 °C)   Temp src Oral   SpO2 97 %   O2 Device None (Room air)       Current Vitals:   Vital Signs  BP:  115/71  Pulse: 88  Resp: 18  Temp: 98.1 °F (36.7 °C)  Temp src: Oral    Oxygen Therapy  SpO2: 97 %  O2 Device: None (Room air)            Physical Exam  Vitals and nursing note reviewed.   Constitutional:       Appearance: Normal appearance.   Cardiovascular:      Rate and Rhythm: Normal rate.      Pulses: Normal pulses.   Pulmonary:      Effort: Pulmonary effort is normal.   Chest:          Comments: Ttp redness > to the left lower outer quadrant of the breast, no puckering no abscess  Musculoskeletal:      Cervical back: Normal range of motion.   Neurological:      Mental Status: She is alert.                 ED Course  Labs Reviewed - No data to display                         MDM     Assessment & Plan  Acute mastitis in the right breast  Acute mastitis likely due to inadequate breastfeeding and pumping leading to milk stasis. No abscess detected. Presentation consistent with mastitis.  - Prescribed Keflex 500 mg orally three times a day for 10 days. Safe for baby.  - Advised to continue breastfeeding and pumping regularly.  - Recommended applying heat, taking warm showers, and massaging the affected area.  - Instructed to monitor for worsening symptoms such as increased redness, swelling, or fever despite antibiotics.            Medical Decision Making  Problems Addressed:  Mastitis: acute illness or injury with systemic symptoms that poses a threat to life or bodily functions    Risk  OTC drugs.  Prescription drug management.        Disposition and Plan     Clinical Impression:  1. Mastitis         Disposition:  Discharge  7/6/2025 11:27 am    Follow-up:  Samantha Jordan MD  [address] [place] [id number] [phone]    Schedule an appointment as soon as possible for a visit in 3 days  If symptoms worsen advise ER          Medications Prescribed:  Discharge Medication List as of 7/6/2025 11:29 AM        START taking these medications    Details   cephALEXin 500 MG Oral Cap Take 1 capsule (500 mg total) by  mouth 3 (three) times daily for 10 days., Normal, Disp-30 capsule, R-0                   Supplementary Documentation:

## 2025-07-06 NOTE — ED INITIAL ASSESSMENT (HPI)
Patient is breastfeeding. Onset of left breast pain and tenderness yesterday. Warm to touch. + chills and body aches. No fever. C/o headache today.

## 2025-07-11 NOTE — TELEPHONE ENCOUNTER
Viola delivered on 616/2025 w/Dr. Chu.   She was seen in Urgent Care on 7/6/2025 for Mastitis and placed on Cephalexin 500 mg TID x10 days.       Viola started the medication above and was feeling great until her 2 am feeding when she noted lump in the breast. She is also starting to experience tenderness to the breast, body aches, chills and fatigue. These are the s/s that took her to urgent care the first time, but not as bad as before.   She has stopped the ibuprofen and tylenol since she was feeling better.     Encouraged to continue antibiotics.   Restarted tylenol and ibuprofen   Massage breast from armpit to nipple before and while baby is latched  Start with baby to breast first then pump if needed   Warm shower and ice packs    Informed will route to MD on-call for review and recommendations. Viola is appreciative.     To Dr. Adan on-call, please review and advise. Thank you.

## 2025-07-11 NOTE — TELEPHONE ENCOUNTER
Patient was seen IM care for mastoiditis patient said symptoms are coming back , asking to speak to nurse

## 2025-07-11 NOTE — TELEPHONE ENCOUNTER
Spoke with RN. Chart reviewed. We'll switch to [facility] active PCN. Diclox prescription sent and begin today.

## 2025-07-14 NOTE — PROGRESS NOTES
HPI:       The following individual(s) verbally consented to be recorded using ambient AI listening technology and understand that they can each withdraw their consent to this listening technology at any point by asking the clinician to turn off or pause the recording:    Patient name: Viola Thomas  Additional names:  none    History of Present Illness  Viola Thomas is a 35 year old female who presents with a bump on her left eyelid and blurred vision.    She has had a bump on her left eyelid for about six weeks, causing pinching pain and discomfort. She initially attempted to flush her eye, suspecting a foreign body, but the bump persists. Over the past few weeks, she experiences blurred vision in the left eye, unable to read when the right eye is covered. There is no redness, discharge, or crustiness. She feels dryness in the eye, which has not improved with saline, artificial tears, or eye wipes. She recently had a baby and is breastfeeding, limiting her time to address this issue sooner.    Medications:   Current Medications[1]    Allergies:   Allergies[2]    History:     Health Maintenance   Topic Date Due    Annual Physical  Never done    DTaP,Tdap,and Td Vaccines (7 - Tdap) 04/06/2015    Pap Smear  05/06/2023    COVID-19 Vaccine (1 - 2024-25 season) Never done    Influenza Vaccine (1) 10/01/2025    Annual Depression Screening  Completed    Pneumococcal Vaccine: Birth to 50yrs  Aged Out    Meningococcal B Vaccine  Aged Out       Patient's last menstrual period was 09/14/2024 (approximate).   Past Medical History:   Past Medical History[3]    Past Surgical History:   Past Surgical History[4]    Family History:   Family History[5]    Social History:     Social History     Socioeconomic History    Marital status: Single     Spouse name: Not on file    Number of children: Not on file    Years of education: Not on file    Highest education level: Not on file   Occupational History    Not on file   Tobacco  Use    Smoking status: Former     Current packs/day: 0.00     Average packs/day: 0.5 packs/day for 10.0 years (5.0 ttl pk-yrs)     Types: Cigarettes     Start date: 7/4/2012     Quit date: 7/4/2022     Years since quitting: 3.0     Passive exposure: Never    Smokeless tobacco: Former     Quit date: 09/2024   Vaping Use    Vaping status: Never Used   Substance and Sexual Activity    Alcohol use: Not Currently     Alcohol/week: 1.0 - 2.0 standard drink of alcohol     Types: 1 - 2 Standard drinks or equivalent per week     Comment: 2-3 drinks every weekend before preg    Drug use: No    Sexual activity: Yes     Partners: Male     Birth control/protection: Condom, OCP     Comment: same partner x 2 years   Other Topics Concern     Service Not Asked    Blood Transfusions No    Caffeine Concern No    Occupational Exposure Not Asked    Hobby Hazards Not Asked    Sleep Concern No    Stress Concern Yes    Weight Concern No    Special Diet No    Back Care No    Exercise Yes     Comment: ocassional    Bike Helmet Not Asked    Seat Belt Yes    Self-Exams Not Asked   Social History Narrative    Work - bartending, 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     Social Drivers of Health     Food Insecurity: No Food Insecurity (6/6/2025)    NCSS - Food Insecurity     Worried About Running Out of Food in the Last Year: No     Ran Out of Food in the Last Year: No   Transportation Needs: No Transportation Needs (6/6/2025)    NCSS - Transportation     Lack of Transportation: No   Stress: No Stress Concern Present (6/6/2025)    Stress     Feeling of Stress : No   Housing Stability: Unknown (6/6/2025)    NCSS - Housing/Utilities     Has Housing: Yes     Worried About Losing Housing: No     Unable to Get Utilities: Not on file       Review of Systems:   Review of Systems   Eyes:  Positive for pain and visual disturbance. Negative for discharge, redness and itching.        Vitals:    07/14/25 1310   BP: 107/72   Pulse: 66   Weight:  144 lb (65.3 kg)     Body mass index is 25.51 kg/m².    Physical Exam:   Physical Exam  Vitals reviewed.   Eyes:      General:         Right eye: No discharge.         Left eye: No discharge.      Conjunctiva/sclera: Conjunctivae normal.          Assessment and Plan::     Assessment & Plan  Blurry vision, left eye    Orders:    Ophthalmology Referral - In Network    Squamous blepharitis of left upper eyelid    Orders:    Erythromycin; Place 1 Application into both eyes in the morning, at noon, and at bedtime.  Dispense: 3.5 g; Refill: 0    Continue to apply warm compresses.     Discussed plan of care with pt and pt is in agreement.All questions answered. Pt to call with questions or concerns.         [1]   Current Outpatient Medications   Medication Sig Dispense Refill    cephALEXin 500 MG Oral Cap Take 1 capsule (500 mg total) by mouth in the morning and 1 capsule (500 mg total) in the evening and 1 capsule (500 mg total) before bedtime.      erythromycin 5 MG/GM Ophthalmic Ointment Place 1 Application into both eyes in the morning, at noon, and at bedtime. 3.5 g 0    valACYclovir (VALTREX) 500 MG Oral Tab Take 1 tablet (500 mg total) by mouth 2 (two) times daily. 60 tablet 0    prenatal vitamin with DHA 27-0.8-228 MG Oral Cap Take 1 capsule by mouth in the morning.     [2] No Known Allergies  [3]   Past Medical History:   Abnormal Pap smear of cervix    Anemia    with prev preg.    Chlamydia    Chronic maxillary sinusitis    Decorative tattoo    Deviated nasal septum    Diarrhea    probably IBS, was previously dx'ed with Crohn's    Genital herpes simplex    Herpes    HPV in female    Hypertrophy of nasal turbinates    Nasal septal deviation    Varicella    chicken pox in childhood    Visual impairment    glasses   [4]   Past Surgical History:  Procedure Laterality Date    Colonoscopy  02/2013    Colonoscopy N/A 04/29/2021    Procedure: COLONOSCOPY;  Surgeon: Joey Strong MD;  Location: [facility] ENDOSCOPY     Colposcopy, cervix w/upper adjacent vagina; w/biopsy(s), cervix      D & c      Excision turbinate      Leep  2014    Leep      Nasal scope,bx/rmv polyp/debrid  11/09/2018    Nasal scope,bx/rmv polyp/debrid  09/18/2020    Nasal scopy,rmv tiss maxill sinus      Nasal scopy,rmv tiss maxill sinus  11/09/2018    Repair of nasal septum      Sinus surgery        septoplasty, TBR    Tonsillectomy     [5]   Family History  Problem Relation Age of Onset    No Known Problems Father     No Known Problems Mother     No Known Problems Sister     No Known Problems Brother

## (undated) DEVICE — HEAD & NECK: Brand: MEDLINE INDUSTRIES, INC.

## (undated) DEVICE — SOL  .9 1000ML BTL

## (undated) DEVICE — LINE MNTR ADLT SET O2 INTMD

## (undated) DEVICE — NASAL ACCESSORY: Brand: MEDLINE INDUSTRIES, INC.

## (undated) DEVICE — PROPEL MINI SINUS IMPLANT
Type: IMPLANTABLE DEVICE | Site: NOSE
Brand: PROPEL MINI

## (undated) DEVICE — SUTURE CHROMIC GUT 3-0 SH

## (undated) DEVICE — SOLUTION IRR BTL 250CC NACL

## (undated) DEVICE — SUTURE PLAIN GUT 4-0 SC-1

## (undated) DEVICE — MEDI-VAC NON-CONDUCTIVE SUCTION TUBING: Brand: CARDINAL HEALTH

## (undated) DEVICE — KENDALL SCD EXPRESS SLEEVES, KNEE LENGTH, MEDIUM: Brand: KENDALL SCD

## (undated) DEVICE — SUTURE SILK 2-0 623H

## (undated) DEVICE — CLIP LGT 11MM OPEN 2.8MM 235CM

## (undated) DEVICE — STERILE LATEX POWDER-FREE SURGICAL GLOVESWITH NITRILE COATING: Brand: PROTEXIS

## (undated) DEVICE — SUCTION CANISTER, 3000CC,SAFELINER: Brand: DEROYAL

## (undated) DEVICE — T&A: Brand: MEDLINE INDUSTRIES, INC.

## (undated) DEVICE — BLADE 1884004 TRICUT 5PK 4MM: Brand: TRICUT®

## (undated) DEVICE — APPLICATOR COTTON TIP 6\" 2/PK

## (undated) DEVICE — COVER SGL STRL LGHT HNDL BLU

## (undated) DEVICE — SUTURE SILK 3-0 SH

## (undated) DEVICE — ELECTRODE LOOP YELLOW 10X10

## (undated) DEVICE — ELECTRODE BALL 5MM DBL-511

## (undated) DEVICE — GAMMEX® PI HYBRID SIZE 6.5, STERILE POWDER-FREE SURGICAL GLOVE, POLYISOPRENE AND NEOPRENE BLEND: Brand: GAMMEX

## (undated) DEVICE — SYRINGE 10ML LL CONTRL SYRINGE

## (undated) DEVICE — SHEATH 1912041 5PK ES2 STRYKER 4MM/30DEG: Brand: ENDO-SCRUB®

## (undated) DEVICE — ELECTROSURGICAL SUCTION COAGULATOR, 10FR

## (undated) DEVICE — ELECTRODE ESURG CBLTR2 RFLX UL

## (undated) DEVICE — BLADE 1884380EM QUADCUT 4.3MMX13CM ROHS: Brand: ROTATABLE FUSION®

## (undated) DEVICE — COTTON BALLS: Brand: DEROYAL

## (undated) DEVICE — SHEATH 1912040 5PK ES2 STRYKER 4MM/0DEG: Brand: ENDO-SCRUB®

## (undated) DEVICE — REFLEX ULTRA 45 WITH INTEGRATED CABLE: Brand: COBLATION

## (undated) DEVICE — MEDI-VAC NON-CONDUCTIVE SUCTION TUBING 6MM X 1.8M (6FT.) L: Brand: CARDINAL HEALTH

## (undated) DEVICE — 35 ML SYRINGE REGULAR TIP: Brand: MONOJECT

## (undated) DEVICE — Device: Brand: DEFENDO AIR/WATER/SUCTION AND BIOPSY VALVE

## (undated) DEVICE — D AND C PACK: Brand: MEDLINE INDUSTRIES, INC.

## (undated) DEVICE — PENCIL ESURG 10FT 3/32IN SS

## (undated) DEVICE — CAUTERY BLADE 2IN INS E1455

## (undated) DEVICE — STIRRUP STRAP W/SLING RING

## (undated) DEVICE — WIPE WITH WICK AND CORNEAL SHIELD: Brand: MEROCEL

## (undated) DEVICE — PATIENT TRACKER 9734887XOM NON-INVASIVE

## (undated) DEVICE — Device: Brand: CUSTOM PROCEDURE KIT

## (undated) DEVICE — EYE PADSSTERILENOT MADE WITH NATURAL RUBBER LATEXSINGLE USE ONLYDO NOT USE IF PACKAGE OPENED OR DAMAGED: Brand: CARDINAL HEALTH

## (undated) DEVICE — REM POLYHESIVE ADULT PATIENT RETURN ELECTRODE: Brand: VALLEYLAB

## (undated) DEVICE — INSTRUMENT TRACKER 9733533XOM ENT 1PK

## (undated) DEVICE — PACKING 8CM LNG SPT NSL STNG

## (undated) NOTE — MR AVS SNAPSHOT
[place]  [address]  [phone]               Thank you for choosing us for your health care visit with Abby Guerrier MD.  We are glad to serve you and happy to provide you with this summar

## (undated) NOTE — MR AVS SNAPSHOT
[place]  [address]  [phone]               Thank you for choosing us for your health care visit with Batool Camarnea MD.  We are glad to serve you and happy to provide you with this summar

## (undated) NOTE — Clinical Note
AUTHORIZATION FOR SURGICAL OPERATION OR OTHER PROCEDURE    1.  I hereby authorize Dr. Michelle Jackson, and [facility] staff assigned to my case to perform the following operation and/or procedure at the [facility]:    ____________________________ Patient signature:  ___________________________________________________             Relationship to Patient:             Parent    Responsible person                            Spouse  In case of minor or                     Other  _____________   Incompet

## (undated) NOTE — ED AVS SNAPSHOT
[facility] AND [facility] in [address] [address] [address]    Phone:  [phone]    Fax:  [phone]           Delmi Perdomo   MRN: [id number]    Department:  [facility] AND [facility] in [address] [place] [address]   Date of Visit: PHARYNGITIS, STREP (CONFIRMED) (ENGLISH)      Disclosure     Insurance plans vary and the physician(s) referred by the Immediate Care may not be covered by your plan. It is possible that the physician may not participate in your health insurance plan.   Danitza Cash IF THERE IS ANY CHANGE OR WORSENING OF YOUR CONDITION, CALL YOUR PRIMARY CARE PHYSICIAN AT ONCE OR GO TO THE EMERGENCY DEPARTMENT.     If you have been prescribed any medication(s), please fill your prescription right away and begin taking the medication(s) you to explore options for quitting.     - If you have concerns related to behavioral health issues or thoughts of harming yourself, contact [facility] at [phone].     - If you don’t have insurance, Nestor Hauser

## (undated) NOTE — LETTER
[facility]  Administration of Anesthesia  I, Viola Thomas agree to be cared for by a physician anesthesiologist alone and/or with a nurse anesthetist, who is specially trained to monitor me and give me medicine to put me to sleep or keep me comfortable during my procedure    I understand that my anesthesiologist and/or anesthetist is not an employee or agent of [facility] or [employer]. He or she works for [facility]    As the patient asking for anesthesia services, I agree to:  Allow the anesthesiologist (anesthesia doctor) to give me medicine and do additional procedures as necessary. Some examples are: Starting or using an “IV” to give me medicine, fluids or blood during my procedure, and having a breathing tube placed to help me breathe when I’m asleep (intubation). In the event that my heart stops working properly, I understand that my anesthesiologist will make every effort to sustain my life, unless otherwise directed by [facility] Do Not Resuscitate documents.  Tell my anesthesia doctor before my procedure:  If I am pregnant.  The last time that I ate or drank.  iii. All of the medicines I take (including prescriptions, herbal supplements, and pills I can buy without a prescription (including street drugs/illegal medications). Failure to inform my anesthesiologist about these medicines may increase my risk of anesthetic complications.  iv.If I am allergic to anything or have had a reaction to anesthesia before.  I understand how the anesthesia medicine will help me (benefits).  I understand that with any type of anesthesia medicine there are risks:  The most common risks are: nausea, vomiting, sore throat, muscle soreness, damage to my eyes, mouth, or teeth (from breathing tube placement).  Rare risks include: remembering what happened during my procedure, allergic reactions to medications, injury to my airway, heart, lungs, vision, nerves, or  muscles and in extremely rare instances death.  My doctor has explained to me other choices available to me for my care (alternatives).  Pregnant Patients (“epidural”):  I understand that the risks of having an epidural (medicine given into my back to help control pain during labor), include itching, low blood pressure, difficulty urinating, headache or slowing of the baby’s heart. Very rare risks include infection, bleeding, seizure, irregular heart rhythms and nerve injury.  Regional Anesthesia (“spinal”, “epidural”, & “nerve blocks”):  I understand that rare but potential complications include headache, bleeding, infection, seizure, irregular heart rhythms, and nerve injury.    _____________________________________________________________________________  Patient (or Representative) Signature/Relationship to Patient  Date   Time    _____________________________________________________________________________   Name (if used)    Language/Organization   Time    _____________________________________________________________________________  Nurse Anesthetist Signature     Date   Time  _____________________________________________________________________________  Anesthesiologist Signature     Date   Time  I have discussed the procedure and information above with the patient (or patient’s representative) and answered their questions. The patient or their representative has agreed to have anesthesia services.    _____________________________________________________________________________  Witness        Date   Time  I have verified that the signature is that of the patient or patient’s representative, and that it was signed before the procedure  Patient Name: Viola Thomas     : 1990                 Printed: 2025 at 8:13 PM    Medical Record #: [id number]                                            Page 1 of 1  ----------ANESTHESIA CONSENT----------

## (undated) NOTE — LETTER
Patient Name: Viola Thomas  YOB: 1990          MRN :  [id number]  Date:  5/14/2025  Referring Physician:  Liana Simon    Discharge Summary  Pt has attended 7 visits in Physical Therapy.     Patient: Viola Thomas (34 year old, female) Referring Provider:  Insurance:   Diagnosis: Stress incontinence (N39.3) Liana Simon  [facility]   Date of Surgery: No data recorded Next MD visit:  N/A   Precautions:  -- (pregnancy 3rd trimester) No data recorded Referral Information:    Date of Evaluation: Req: 13, Auth: 13, Exp: 6/30/2025    No data recorded POC Auth Visits:  13      Today's Date   5/14/2025    Subjective  Pt feeling ready for discharge. SI joints are feeling good. Has not been having pelvic pain. No urinary leakage. Has been consistent with exercises, feels like they are helping. Started to have some tierra montoya contractions here and there. Has noticed she has been nesting.        Pain: 0/10     Objective  see flowsheet below               21st Century Cures Act Notice to Patient: Medical documents like this are made available to patients in the interest of transparency. However, be advised this is a medical document and it is intended as keey-cf-kuyj communication between your medical providers. This medical document may contain abbreviations, assessments, medical data, and results or other terms that are unfamiliar. Medical documents are intended to carry relevant information, facts as evident, and the clinical opinion of the practitioner. As such, this medical document may be written in language that appears blunt or direct. You are encouraged to contact your medical provider and/or [facility] if you have any questions about this medical document.

## (undated) NOTE — MR AVS SNAPSHOT
[address]  [address] [facility]  [phone]               Thank you for choosing us for your health care visit with Cheli Jordan MD.  We are glad to serve you and happy to provide you with this summar Where to Get Your Medications      These medications were sent to [address] [facility], [phone], [address] [facility], [place] [phone]     Phone:  [phone]

## (undated) NOTE — IP AVS SNAPSHOT
[facility] - [facility]    [address], [place] ~ [phone]                Discharge Summary   4/17/2017    [address] [place] [address]           Admission Information        Provider Department    4/17/2017 Rene Montgomery MD [facility] · That you have someone stay with you on your first night home   · Do not drink alcohol or take sleeping pills or tranquilizers   · Do not sign legal documents within 24 hours of your procedure   · If you had a nerve block for your surgery, take extra care Questionnaires will be mailed to randomly selected surgery patients 30 days after their surgery.       If you receive a questionnaire about your surgery, please take a few minutes to answer the questions and return in the provided self-addressed stamped env can help with your Affordable Care Act coverage, as well as all types of Medicaid plans. To get signed up and covered, please call [phone] and ask to get set up for an insurance coverage that is in-network with Nestor Hauser.         Visit

## (undated) NOTE — LETTER
Date & Time: 3/23/2018, 1:04 PM  Patient: Barbara Ruiz  Attending Provider:    Sincerely,    CAROL Das         To Whom It May Concern:    Darryl Reed was seen and treated in our department on 3/23/2018.  She should not return to work u

## (undated) NOTE — MR AVS SNAPSHOT
[address]  [phone]               Thank you for choosing us for your health care visit with Blanca Nicholson.  Gage Montemayor MD.  We are glad to serve you and happy to provide you with this summar Visit [facility] online at  [email or web address]

## (undated) NOTE — ED AVS SNAPSHOT
Vitor Tucker   MRN: [id number]    Department:  [facility] Emergency Department   Date of Visit:  10/23/2018           Disclosure     Insurance plans vary and the physician(s) referred by the ER may not be covered by your plan.  Please cont CARE PHYSICIAN AT ONCE OR RETURN IMMEDIATELY TO THE EMERGENCY DEPARTMENT. If you have been prescribed any medication(s), please fill your prescription right away and begin taking the medication(s) as directed.   If you believe that any of the medications

## (undated) NOTE — LETTER
3/28/2018              Vitor Tucker        [address] [facility]         Dear Willis Diaz,     Negative gonorrhea / chlamydia , HIV, syphillis, hepatitis B, hepatitis C screening.          Sincerely,    [facility]

## (undated) NOTE — MR AVS SNAPSHOT
[address]  [phone]               Thank you for choosing us for your health care visit with Rodney Reeves.  León Reynolds MD.  We are glad to serve you and happy to provide you with this summar acquired. Please contact the Patient Business Office at [phone] if you have any questions related to insurance coverage. Thank you.          Reason for Today's Visit     Nose Problem           Medical Issues Discussed Today     Nasal obstruction    -

## (undated) NOTE — Clinical Note
Abdi Simon, [address] [address] [address], [address]       02/28/2017        Patient: Marie Lee   YOB: 1990   Date of Visit: 2/28/2017       Dear  Dr. Sulma Jj MD,      Thank you for referring Marie Lee to my practice.   Please

## (undated) NOTE — IP AVS SNAPSHOT
[facility] - [facility]    [address], [place], [place] ~ [phone]                Discharge Summary   3/20/2017    Betsey Echeverria           Admission Information        Provider Department    3/20/2017 Scooby Hernandez.  Robert Moody MD [facility] Pre-Op saturate more than 4 mustache dressings in one hour call your doctor. You may also use \"Afrin nasal spray\" inside the nose to decrease the bleeding. Follow the instructions on the bottle.     Do not blow your nose until your Doctor tells you that its ok hours. You can gargle with warm salt water (1/2 tsp in 4 oz warm water) or use a throat lozenge for comfort  · To feel muscle aches or soreness especially in the abdomen, chest or neck.  The achy feeling should go away in the next 24 hours  · To feel weak, [address]  [phone]      Discharge References/Attachments     DISCHARGE INSTRUCTIONS: AFTER YOUR SURGERY (ENGLISH)      Instructions and Information about Your Health     None      Follow-up Information We want to hear from you       We want to hear from you, please share your experience with us by returning the survey you will receive in the mail. Thank you!         MyChart     Visit [employer]  You can access your MyChart to more actively manage your he

## (undated) NOTE — Clinical Note
3/1/2017              Antonella Avila        [address] [facility]         To Whom it may concern:     This is to certify that Antonella Avila had an appointment on 3/1/2017 at 3:43 PM with Marlo Rothman MD.        Sincerely,

## (undated) NOTE — LETTER
8/20/2018              Luis A Sessions        [address] [facility]         Dear Jonah Frye,    It was a pleasure to see you. Negative gonorrhea / chlamydia, HIV, syphillis, hepatitis B, hepatitis C screening.    There is no need

## (undated) NOTE — ED AVS SNAPSHOT
[facility] AND [facility] Immediate Care in [address] [address] [address]    Phone:  [phone]    Fax:  [phone]           Maynor Shaw   MRN: [id number]    Department:  [facility] AND [facility] Immediate Care in [address] [place] [address]   Date of Visit: physician may seek payment directly from you for amounts other than your deductible, co-payment, or co-insurance and for other services not covered under your health insurance plan.   Please contact your insurance company and physician's office to determine different from what your Primary Care doctor has instructed you - please continue to take your medications as instructed by your Primary Care doctor until you can check with your doctor. Please bring the medication list to your next doctor's appointment. coverage. Patient [address] is a [employer] program that can help with your Affordable Care Act coverage, as well as all types of Medicaid plans.   To get signed up and covered, please call [phone] and ask to get set up for an insuran

## (undated) NOTE — Clinical Note
MINOR CASE LETTER      3/8/2017        Dear Fayrene Files are having a Leep procedure on 4/17/17 at 7:30am.    Do not eat or drink anything (including water) after midnight the night before surgery.     If your procedure is scheduled later in the day, then

## (undated) NOTE — ED AVS SNAPSHOT
Navid Flores   MRN: [id number]    Department:  [facility] Emergency Department   Date of Visit:  12/18/2018           Disclosure     Insurance plans vary and the physician(s) referred by the ER may not be covered by your plan.  Please cont CARE PHYSICIAN AT ONCE OR RETURN IMMEDIATELY TO THE EMERGENCY DEPARTMENT. If you have been prescribed any medication(s), please fill your prescription right away and begin taking the medication(s) as directed.   If you believe that any of the medications

## (undated) NOTE — LETTER
[facility] IN [place] [address] [address]  Dept: [phone]  Dept Fax: [phone] 49: [phone]      April 10, 2017    Patient: Sarina Baldwin   Date of Visit: 4/10/2017       To Whom It May Concern:    Massiel Quinteros

## (undated) NOTE — Clinical Note
6/22/2017              Raphael Villanueva        [address] [facility]         To Whom It May Concern,    Patient was seen in my office this morning.     Sincerely,    Tremayne Stone MD  [facility], [address]

## (undated) NOTE — Clinical Note
5/15/2017              Lizeth Lewis        [address] [facility]         To Whom It May Concern,    Patient seen in my office today.       Sincerely,    Candido Hugo MD  [address]

## (undated) NOTE — Clinical Note
4/3/2017          To Whom It May Concern:    Delmi Perdomo is currently under my medical care and may return to school at this time. Activity is restricted as follows: none. If you require additional information please contact our office.

## (undated) NOTE — MR AVS SNAPSHOT
[place]  [address]  [phone]               Thank you for choosing us for your health care visit with Matthias Perea MD.  We are glad to serve you and happy to provide you with this summary of

## (undated) NOTE — Clinical Note
3/17/2017          To Whom It May Concern:    Sergio Velez is currently under my medical care and may not return to work at this time. She may return to work on 03/27/17. Activity is restricted as follows: none.     If you require additional informatio

## (undated) NOTE — Clinical Note
3/17/2017          To Whom It May Concern:    Sebastian Siddiqui is currently under my medical care and may not return to school at this time. Please excuse Felice Ablright for 2 weeks. She may return to school on 04/03/17. Activity is restricted as follows: none.

## (undated) NOTE — MR AVS SNAPSHOT
[address]  [phone]               Thank you for choosing us for your health care visit with Verenice Ceron.  Deepak Wilson MD.  We are glad to serve you and happy to provide you with this summar office, you can view your past visit information in [employer] by going to Visits < Visit Summaries. [employer] questions? Call [phone] for help. [employer] is NOT to be used for urgent needs. For medical emergencies, dial 911.            Visit [facility]

## (undated) NOTE — LETTER
[facility] IN [place] [address] [address]  Dept: [phone]  Dept Fax: [phone] 49: [phone]      April 10, 2017    Patient: Navid Flores   Date of Visit: 4/10/2017       To Whom It May Concern:    Viri Mares

## (undated) NOTE — ED AVS SNAPSHOT
Arun Santos   MRN: [id number]    Department:  [facility] Emergency Department   Date of Visit:  8/11/2018           Disclosure     Insurance plans vary and the physician(s) referred by the ER may not be covered by your plan.  Please conta CARE PHYSICIAN AT ONCE OR RETURN IMMEDIATELY TO THE EMERGENCY DEPARTMENT. If you have been prescribed any medication(s), please fill your prescription right away and begin taking the medication(s) as directed.   If you believe that any of the medications

## (undated) NOTE — LETTER
Dear Dr. Bennett Morillo    This letter is to inform you that Wilian Loco has been attending Physical Therapy with me. See below for my most recent plan of care.      Patient Name: Wilian Loco, : 1990, MRN: [id number]   Date:  10/3/2018 Rhomboids 4/5 3+/5   Mid trap 4/5 3+/5   Lower trap 4/5 3+/5   Serratus 5/5 5/5      Strength LE:      R  L   Hip flex   4/5  4/5   Hip ext 5/5 5/5   Hip abd 5/5 5/5   Hip ER 5/5 5/5   Hip IR 5/5 5/5   Knee Flex 5/5 5/5   Knee ext 5/5 5/5   Glut med 4-/5 4 Cert